# Patient Record
Sex: FEMALE | Race: WHITE | NOT HISPANIC OR LATINO | Employment: FULL TIME | URBAN - METROPOLITAN AREA
[De-identification: names, ages, dates, MRNs, and addresses within clinical notes are randomized per-mention and may not be internally consistent; named-entity substitution may affect disease eponyms.]

---

## 2017-01-24 ENCOUNTER — OFFICE VISIT (OUTPATIENT)
Dept: URGENT CARE | Facility: PHYSICIAN GROUP | Age: 22
End: 2017-01-24
Payer: COMMERCIAL

## 2017-01-24 ENCOUNTER — HOSPITAL ENCOUNTER (OUTPATIENT)
Facility: MEDICAL CENTER | Age: 22
End: 2017-01-24
Attending: EMERGENCY MEDICINE
Payer: COMMERCIAL

## 2017-01-24 VITALS
HEART RATE: 60 BPM | WEIGHT: 200 LBS | SYSTOLIC BLOOD PRESSURE: 124 MMHG | DIASTOLIC BLOOD PRESSURE: 80 MMHG | TEMPERATURE: 97.3 F | BODY MASS INDEX: 31.32 KG/M2 | OXYGEN SATURATION: 97 %

## 2017-01-24 DIAGNOSIS — J02.9 PHARYNGITIS, UNSPECIFIED ETIOLOGY: ICD-10-CM

## 2017-01-24 LAB
INT CON NEG: NEGATIVE
INT CON POS: POSITIVE
S PYO AG THROAT QL: NORMAL

## 2017-01-24 PROCEDURE — 87880 STREP A ASSAY W/OPTIC: CPT | Performed by: EMERGENCY MEDICINE

## 2017-01-24 PROCEDURE — 99214 OFFICE O/P EST MOD 30 MIN: CPT | Performed by: EMERGENCY MEDICINE

## 2017-01-24 PROCEDURE — 87070 CULTURE OTHR SPECIMN AEROBIC: CPT

## 2017-01-24 RX ORDER — ALBUTEROL SULFATE 90 UG/1
2 AEROSOL, METERED RESPIRATORY (INHALATION) EVERY 6 HOURS PRN
Qty: 8.5 G | Refills: 1 | Status: SHIPPED | OUTPATIENT
Start: 2017-01-24 | End: 2020-07-16

## 2017-01-24 RX ORDER — TRAMADOL HYDROCHLORIDE 50 MG/1
50 TABLET ORAL EVERY 4 HOURS PRN
COMMUNITY
End: 2017-03-28

## 2017-01-24 ASSESSMENT — ENCOUNTER SYMPTOMS
SENSORY CHANGE: 0
SPEECH CHANGE: 0
HEADACHES: 0
NAUSEA: 0
NERVOUS/ANXIOUS: 0
NECK PAIN: 0
HOARSE VOICE: 0
COUGH: 0
TROUBLE SWALLOWING: 1
FEVER: 0
BACK PAIN: 0
SWOLLEN GLANDS: 1
ABDOMINAL PAIN: 0
CHILLS: 0
DIARRHEA: 0
STRIDOR: 0
SHORTNESS OF BREATH: 0
SORE THROAT: 1
VOMITING: 0

## 2017-01-24 NOTE — Clinical Note
January 24, 2017        Alexis Osorio  [address] [place] [address] [address] [place] [address]        Dear Alexis:    Please ask to be allowed to stay home from school/ work for the next two days, for medical reasons.    If you have any questions or concerns, please don't hesitate to call.        Sincerely,        CHAU Brody M.D.    Electronically Signed

## 2017-01-24 NOTE — PROGRESS NOTES
Subjective:      Alexis Osorio is a 21 y.o. female who presents with Pharyngitis            Pharyngitis   This is a recurrent problem. The current episode started in the past 7 days. Neither side of throat is experiencing more pain than the other. There has been no fever. The pain is at a severity of 5/10. Associated symptoms include swollen glands and trouble swallowing. Pertinent negatives include no abdominal pain, congestion, coughing, diarrhea, drooling, ear pain, headaches, hoarse voice, neck pain, shortness of breath, stridor or vomiting.   No Known Allergies  Social History     Social History   • Marital Status: Single     Spouse Name: N/A   • Number of Children: N/A   • Years of Education: N/A     Occupational History   • Not on file.     Social History Main Topics   • Smoking status: Light Tobacco Smoker -- 0.25 packs/day   • Smokeless tobacco: Not on file      Comment: quit 1 week ago   • Alcohol Use: No   • Drug Use: No   • Sexual Activity:     Partners: Male     Birth Control/ Protection: Condom, Pill     Other Topics Concern   • Not on file     Social History Narrative     Past Medical History   Diagnosis Date   • Axillary abscess 9/30/2012   • Asthma    • Anxiety      Current Outpatient Prescriptions on File Prior to Visit   Medication Sig Dispense Refill   • escitalopram (LEXAPRO) 10 MG Tab Take 10 mg by mouth every day.     • azithromycin (ZITHROMAX) 250 MG Tab Take 2 tabs by mouth on day 1, then take 1 tab on days 2-5 6 Tab 0   • ibuprofen (MOTRIN) 200 MG Tab Take 200 mg by mouth every 6 hours as needed.     • Pseudoeph-Doxylamine-DM-APAP (NYQUIL MULTI-SYMPTOM PO) Take  by mouth.     • amoxicillin-clavulanate (AUGMENTIN) 875-125 MG Tab Take 1 Tab by mouth 2 times a day. 20 Tab 0   • methylPREDNISolone (MEDROL DOSPACK) 4 MG Tab Use taper dose bharathi as directed 21 Tab 0   • cefdinir (OMNICEF) 300 MG Cap Take 1 Cap by mouth 2 times a day. 20 Cap 0   • hydrocod polst-chlorphen polst (TUSSIONEX) 10-8  MG/5ML Liquid CR Take 5 mL by mouth every 12 hours. 140 mL 0   • benzonatate (TESSALON) 100 MG Cap Take 1 Cap by mouth 3 times a day as needed for Cough. 30 Cap 0   • desogestrel-ethinyl estradiol (ENSKYCE) 0.15-30 MG-MCG per tablet Take 0.15 Tabs by mouth every day.       No current facility-administered medications on file prior to visit.   History reviewed. No pertinent family history.  Review of Systems   Constitutional: Negative for fever and chills.   HENT: Positive for sore throat and trouble swallowing. Negative for congestion, drooling, ear pain and hoarse voice.    Respiratory: Negative for cough, shortness of breath and stridor.    Gastrointestinal: Negative for nausea, vomiting, abdominal pain and diarrhea.   Genitourinary: Negative.    Musculoskeletal: Negative for back pain and neck pain.   Skin: Negative for rash.   Neurological: Negative for sensory change, speech change and headaches.   Psychiatric/Behavioral: The patient is not nervous/anxious.           Objective:     /80 mmHg  Pulse 60  Temp(Src) 36.3 °C (97.3 °F)  Wt 90.719 kg (200 lb)  SpO2 97%     Physical Exam   Constitutional: She appears well-developed and well-nourished. No distress.   HENT:   Head: Atraumatic.   Right Ear: External ear normal.   Left Ear: External ear normal.   Mouth/Throat: No oropharyngeal exudate.   Patient's pharynx is slightly inflamed, no exudates, no presence of  peritonsillar or retropharyngeal abscess.   Eyes: Conjunctivae are normal. Right eye exhibits no discharge. Left eye exhibits no discharge.   Neck: Normal range of motion. No tracheal deviation present. No thyromegaly present.   Cardiovascular: Normal rate and regular rhythm.    Pulmonary/Chest: Effort normal and breath sounds normal. No stridor. No respiratory distress. She has no wheezes. She has no rales.   Abdominal: She exhibits no distension. There is no tenderness.   Musculoskeletal: Normal range of motion. She exhibits no edema or  tenderness.   Lymphadenopathy:     She has cervical adenopathy.   Neurological: She is alert. Coordination normal.   Skin: Skin is warm and dry. She is not diaphoretic.   Psychiatric: She has a normal mood and affect. Her behavior is normal.               Assessment/Plan:     DX: Pharyngitis          Tobacco abuse.    Rapid strep negative    Throat culture pending    I am recommending the patient initiate/ continue hydration efforts including the use of a vaporizer/humidifier/ netti pot. I also recommend the pt, initiate Mucinex, Mucinex D. in addition the patient will initiate the prescribed prescription medication/s: I will call her for cultures positive If the patient's condition exacerbates with worsening dysphagia, shortness of breath, uncontrolled fever, headache or chest pressure he/she will return immediately to the urgent care or go to  the emergency department for further evaluation. Patient has been given the next 1-2 days off.    CHAU Brody

## 2017-01-24 NOTE — PATIENT INSTRUCTIONS
I will call the patient if her throat culture is positive. We will discuss further time off if necessary.

## 2017-01-26 LAB
BACTERIA SPEC RESP CULT: NORMAL
SIGNIFICANT IND 70042: NORMAL
SOURCE SOURCE: NORMAL

## 2017-02-02 ENCOUNTER — HOSPITAL ENCOUNTER (OUTPATIENT)
Dept: RADIOLOGY | Facility: MEDICAL CENTER | Age: 22
End: 2017-02-02
Attending: OBSTETRICS & GYNECOLOGY
Payer: COMMERCIAL

## 2017-02-02 DIAGNOSIS — R10.2 VAGINAL PAIN: ICD-10-CM

## 2017-02-02 PROCEDURE — 76830 TRANSVAGINAL US NON-OB: CPT

## 2017-02-13 ENCOUNTER — OFFICE VISIT (OUTPATIENT)
Dept: URGENT CARE | Facility: PHYSICIAN GROUP | Age: 22
End: 2017-02-13
Payer: COMMERCIAL

## 2017-02-13 VITALS
SYSTOLIC BLOOD PRESSURE: 110 MMHG | HEIGHT: 66 IN | RESPIRATION RATE: 16 BRPM | OXYGEN SATURATION: 99 % | TEMPERATURE: 97.2 F | HEART RATE: 65 BPM | WEIGHT: 200 LBS | BODY MASS INDEX: 32.14 KG/M2 | DIASTOLIC BLOOD PRESSURE: 64 MMHG

## 2017-02-13 DIAGNOSIS — Z01.810 PRE-OPERATIVE CARDIOVASCULAR EXAMINATION: ICD-10-CM

## 2017-02-13 DIAGNOSIS — Z01.812 PRE-OPERATIVE LABORATORY EXAMINATION: ICD-10-CM

## 2017-02-13 DIAGNOSIS — J02.9 VIRAL PHARYNGITIS: Primary | ICD-10-CM

## 2017-02-13 LAB
ANION GAP SERPL CALC-SCNC: 9 MMOL/L (ref 0–11.9)
BUN SERPL-MCNC: 7 MG/DL (ref 8–22)
CALCIUM SERPL-MCNC: 9.2 MG/DL (ref 8.5–10.5)
CHLORIDE SERPL-SCNC: 106 MMOL/L (ref 96–112)
CO2 SERPL-SCNC: 23 MMOL/L (ref 20–33)
CREAT SERPL-MCNC: 0.53 MG/DL (ref 0.5–1.4)
ERYTHROCYTE [DISTWIDTH] IN BLOOD BY AUTOMATED COUNT: 41.5 FL (ref 35.9–50)
GFR SERPL CREATININE-BSD FRML MDRD: >60 ML/MIN/1.73 M 2
GLUCOSE SERPL-MCNC: 94 MG/DL (ref 65–99)
HCG SERPL QL: NEGATIVE
HCT VFR BLD AUTO: 38.9 % (ref 37–47)
HGB BLD-MCNC: 13.1 G/DL (ref 12–16)
INT CON NEG: NEGATIVE
INT CON POS: POSITIVE
MCH RBC QN AUTO: 31.2 PG (ref 27–33)
MCHC RBC AUTO-ENTMCNC: 33.7 G/DL (ref 33.6–35)
MCV RBC AUTO: 92.6 FL (ref 81.4–97.8)
PLATELET # BLD AUTO: 314 K/UL (ref 164–446)
PMV BLD AUTO: 9.1 FL (ref 9–12.9)
POTASSIUM SERPL-SCNC: 4 MMOL/L (ref 3.6–5.5)
RBC # BLD AUTO: 4.2 M/UL (ref 4.2–5.4)
S PYO AG THROAT QL: NEGATIVE
SODIUM SERPL-SCNC: 138 MMOL/L (ref 135–145)
WBC # BLD AUTO: 5.7 K/UL (ref 4.8–10.8)

## 2017-02-13 PROCEDURE — 36415 COLL VENOUS BLD VENIPUNCTURE: CPT

## 2017-02-13 PROCEDURE — 85027 COMPLETE CBC AUTOMATED: CPT

## 2017-02-13 PROCEDURE — 99213 OFFICE O/P EST LOW 20 MIN: CPT | Performed by: PHYSICIAN ASSISTANT

## 2017-02-13 PROCEDURE — 84703 CHORIONIC GONADOTROPIN ASSAY: CPT

## 2017-02-13 PROCEDURE — 87880 STREP A ASSAY W/OPTIC: CPT | Performed by: PHYSICIAN ASSISTANT

## 2017-02-13 PROCEDURE — 80048 BASIC METABOLIC PNL TOTAL CA: CPT

## 2017-02-13 NOTE — PATIENT INSTRUCTIONS
"Strep Throat  Strep throat is an infection of the throat caused by a bacteria named Streptococcus pyogenes. Your health care provider may call the infection streptococcal \"tonsillitis\" or \"pharyngitis\" depending on whether there are signs of inflammation in the tonsils or back of the throat. Strep throat is most common in children aged 5-15 years during the cold months of the year, but it can occur in people of any age during any season. This infection is spread from person to person (contagious) through coughing, sneezing, or other close contact.  SIGNS AND SYMPTOMS   · Fever or chills.  · Painful, swollen, red tonsils or throat.  · Pain or difficulty when swallowing.  · White or yellow spots on the tonsils or throat.  · Swollen, tender lymph nodes or \"glands\" of the neck or under the jaw.  · Red rash all over the body (rare).  DIAGNOSIS   Many different infections can cause the same symptoms. A test must be done to confirm the diagnosis so the right treatment can be given. A \"rapid strep test\" can help your health care provider make the diagnosis in a few minutes. If this test is not available, a light swab of the infected area can be used for a throat culture test. If a throat culture test is done, results are usually available in a day or two.  TREATMENT   Strep throat is treated with antibiotic medicine.  HOME CARE INSTRUCTIONS   · Gargle with 1 tsp of salt in 1 cup of warm water, 3-4 times per day or as needed for comfort.  · Family members who also have a sore throat or fever should be tested for strep throat and treated with antibiotics if they have the strep infection.  · Make sure everyone in your household washes their hands well.  · Do not share food, drinking cups, or personal items that could cause the infection to spread to others.  · You may need to eat a soft food diet until your sore throat gets better.  · Drink enough water and fluids to keep your urine clear or pale yellow. This will help prevent " dehydration.  · Get plenty of rest.  · Stay home from school, day care, or work until you have been on antibiotics for 24 hours.  · Take medicines only as directed by your health care provider.  · Take your antibiotic medicine as directed by your health care provider. Finish it even if you start to feel better.  SEEK MEDICAL CARE IF:   · The glands in your neck continue to enlarge.  · You develop a rash, cough, or earache.  · You cough up green, yellow-brown, or bloody sputum.  · You have pain or discomfort not controlled by medicines.  · Your problems seem to be getting worse rather than better.  · You have a fever.  SEEK IMMEDIATE MEDICAL CARE IF:   · You develop any new symptoms such as vomiting, severe headache, stiff or painful neck, chest pain, shortness of breath, or trouble swallowing.  · You develop severe throat pain, drooling, or changes in your voice.  · You develop swelling of the neck, or the skin on the neck becomes red and tender.  · You develop signs of dehydration, such as fatigue, dry mouth, and decreased urination.  · You become increasingly sleepy, or you cannot wake up completely.  MAKE SURE YOU:  · Understand these instructions.  · Will watch your condition.  · Will get help right away if you are not doing well or get worse.     This information is not intended to replace advice given to you by your health care provider. Make sure you discuss any questions you have with your health care provider.     Document Released: 12/15/2001 Document Revised: 01/08/2016 Document Reviewed: 04/11/2016  [employer] Interactive Patient Education ©2016 [employer]

## 2017-02-13 NOTE — PROGRESS NOTES
"Subjective:   Pt is a 21 y.o. female who presents with Pharyngitis            Pharyngitis   This is a new problem. The current episode started yesterday. The problem has been gradually worsening. The pain is worse on the left side. The pain is at a severity of 7/10. The pain is moderate. She has had no exposure to strep or mono. She has tried cool liquids and gargles for the symptoms. The treatment provided mild relief.   PT presents to UC clinic today complaining of sore throat, fevers, chills, watery eyes, pressure in ears, cough, fatigue, runny nose. PT denies CP, SOB, NVD, abdominal pain, joint pain. PT states these symptoms began around 2 days ago and that the pt's family has been sick on and off for the last week. Pt has not taken any medications for this condition. Pt notes surgery tomorrow for pelvoscopy. The pt's medication list, problem list, and allergies have been evaluated and reviewed during today's visit.      PMH:  Past Medical History   Diagnosis Date   • Axillary abscess 9/30/2012   • Asthma    • Anxiety    • Breath shortness    • Snoring    • Anesthesia      pt stated her \"Dad woke up during surgery\"   • Gynecological disorder    • Cold      off & on 4 months   • Pain      during period   • Psychiatric problem      depression, anxiety   • Pneumonia 2015       PSH:  Past Surgical History   Procedure Laterality Date   • Appendectomy         Fam Hx:  Father alive and well with no major medical issues  Mother alive and well with no major medical  Issues        Soc HX:  Social History     Social History   • Marital Status: Single     Spouse Name: N/A   • Number of Children: N/A   • Years of Education: N/A     Occupational History   • Not on file.     Social History Main Topics   • Smoking status: Light Tobacco Smoker -- 0.25 packs/day for 5 years     Types: Cigarettes   • Smokeless tobacco: Current User      Comment: quit 1 week ago   • Alcohol Use: No   • Drug Use: Yes      Comment: marijuana   • " Sexual Activity:     Partners: Male     Birth Control/ Protection: Condom, Pill     Other Topics Concern   • Not on file     Social History Narrative         Medications:    Current outpatient prescriptions:   •  tramadol (ULTRAM) 50 MG Tab, Take 50 mg by mouth every four hours as needed., Disp: , Rfl:   •  escitalopram (LEXAPRO) 10 MG Tab, Take 10 mg by mouth every day., Disp: , Rfl:   •  Fexofenadine HCl (MUCINEX ALLERGY PO), Take  by mouth as needed., Disp: , Rfl:   •  albuterol 108 (90 BASE) MCG/ACT Aero Soln inhalation aerosol, Inhale 2 Puffs by mouth every 6 hours as needed for Shortness of Breath., Disp: 8.5 g, Rfl: 1  •  ibuprofen (MOTRIN) 200 MG Tab, Take 200 mg by mouth every 6 hours as needed., Disp: , Rfl:   •  hydrocod polst-chlorphen polst (TUSSIONEX) 10-8 MG/5ML Liquid CR, Take 5 mL by mouth every 12 hours., Disp: 140 mL, Rfl: 0      Allergies:  Review of patient's allergies indicates no known allergies.        ROS  Constitutional: Positive for chills and malaise/fatigue.   HENT: Positive for congestion and sore throat. Negative for ear pain.    Eyes: Negative for blurred vision, double vision and photophobia.   Respiratory: Positive for cough and sputum production. Negative for hemoptysis, shortness of breath and wheezing.    Cardiovascular: Negative for chest pain and palpitations.   Gastrointestinal: Negative for nausea, vomiting, abdominal pain, diarrhea and constipation.   Genitourinary: Negative for dysuria and flank pain.   Musculoskeletal: Negative for falls and myalgias.   Skin: Negative for itching and rash.   Neurological: Positive for headaches. Negative for dizziness and tingling.   Endo/Heme/Allergies: Does not bruise/bleed easily.   Psychiatric/Behavioral: Negative for depression. The patient is not nervous/anxious.             Objective:     LMP 02/13/2017     Physical Exam      Constitutional: PT is oriented to person, place, and time. PT appears well-developed and well-nourished. No  distress.   HENT:   Head: Normocephalic and atraumatic.   Right Ear: Hearing, tympanic membrane, external ear and ear canal normal.   Left Ear: Hearing, tympanic membrane, external ear and ear canal normal.   Nose: Mucosal edema, rhinorrhea and sinus tenderness present. Right sinus exhibits frontal sinus tenderness. Left sinus exhibits frontal sinus tenderness.   Mouth/Throat: Uvula is midline. Mucous membranes are pale. Posterior oropharyngeal edema and posterior oropharyngeal erythema present. No oropharyngeal exudate.   Eyes: Conjunctivae normal and EOM are normal. Pupils are equal, round, and reactive to light.   Neck: Normal range of motion. Neck supple. No thyromegaly present.   Cardiovascular: Normal rate, regular rhythm, normal heart sounds and intact distal pulses.  Exam reveals no gallop and no friction rub.    No murmur heard.  Pulmonary/Chest: Effort normal and breath sounds normal. No respiratory distress. PT has no wheezes. PT has no rales. PT exhibits no tenderness.   Abdominal: Soft. Bowel sounds are normal. PT exhibits no distension and no mass. There is no tenderness. There is no rebound and no guarding.   Musculoskeletal: Normal range of motion. PT exhibits no edema and no tenderness.   Lymphadenopathy:     PT has no cervical adenopathy.   Neurological: PT is alert and oriented to person, place, and time. PT displays normal reflexes. No cranial nerve deficit. PT exhibits normal muscle tone. Coordination normal.   Skin: Skin is warm and dry. No rash noted. No erythema.   Psychiatric: PT has a normal mood and affect. PT behavior is normal. Judgment and thought content normal.          Assessment/Plan:     1. Viral pharyngitis    - POCT Rapid Strep A-->NEG    Rest, fluids encouraged.  OTC decongestant for congestion  AVS with medical info given.  Pt was in full understanding and agreement with the plan.  Follow-up as needed if symptoms worsen or fail to improve.

## 2017-02-14 ENCOUNTER — HOSPITAL ENCOUNTER (OUTPATIENT)
Facility: MEDICAL CENTER | Age: 22
End: 2017-02-14
Attending: OBSTETRICS & GYNECOLOGY | Admitting: OBSTETRICS & GYNECOLOGY
Payer: COMMERCIAL

## 2017-02-14 VITALS
SYSTOLIC BLOOD PRESSURE: 123 MMHG | RESPIRATION RATE: 16 BRPM | TEMPERATURE: 99 F | HEART RATE: 88 BPM | OXYGEN SATURATION: 97 % | WEIGHT: 198.63 LBS | BODY MASS INDEX: 31.92 KG/M2 | HEIGHT: 66 IN | DIASTOLIC BLOOD PRESSURE: 58 MMHG

## 2017-02-14 PROBLEM — R10.2 PELVIC PAIN IN FEMALE: Status: ACTIVE | Noted: 2017-02-14

## 2017-02-14 PROCEDURE — 160048 HCHG OR STATISTICAL LEVEL 1-5: Performed by: OBSTETRICS & GYNECOLOGY

## 2017-02-14 PROCEDURE — 500577 HCHG FORCEP, BIPO CUT (EVEREST): Performed by: OBSTETRICS & GYNECOLOGY

## 2017-02-14 PROCEDURE — 501572 HCHG TROCAR, SHIELD OBTU 5X100: Performed by: OBSTETRICS & GYNECOLOGY

## 2017-02-14 PROCEDURE — 500886 HCHG PACK, LAPAROSCOPY: Performed by: OBSTETRICS & GYNECOLOGY

## 2017-02-14 PROCEDURE — 501688 HCHG UTERINE MANIPULATOR RUMI: Performed by: OBSTETRICS & GYNECOLOGY

## 2017-02-14 PROCEDURE — 700101 HCHG RX REV CODE 250

## 2017-02-14 PROCEDURE — 700111 HCHG RX REV CODE 636 W/ 250 OVERRIDE (IP)

## 2017-02-14 PROCEDURE — A9270 NON-COVERED ITEM OR SERVICE: HCPCS

## 2017-02-14 PROCEDURE — 160029 HCHG SURGERY MINUTES - 1ST 30 MINS LEVEL 4: Performed by: OBSTETRICS & GYNECOLOGY

## 2017-02-14 PROCEDURE — 160009 HCHG ANES TIME/MIN: Performed by: OBSTETRICS & GYNECOLOGY

## 2017-02-14 PROCEDURE — A4314 CATH W/DRAINAGE 2-WAY LATEX: HCPCS | Performed by: OBSTETRICS & GYNECOLOGY

## 2017-02-14 PROCEDURE — 700102 HCHG RX REV CODE 250 W/ 637 OVERRIDE(OP)

## 2017-02-14 PROCEDURE — 500025 HCHG ARMREST, CRADLE FOAM: Performed by: OBSTETRICS & GYNECOLOGY

## 2017-02-14 PROCEDURE — 502240 HCHG MISC OR SUPPLY RC 0272: Performed by: OBSTETRICS & GYNECOLOGY

## 2017-02-14 PROCEDURE — 160002 HCHG RECOVERY MINUTES (STAT): Performed by: OBSTETRICS & GYNECOLOGY

## 2017-02-14 PROCEDURE — 160035 HCHG PACU - 1ST 60 MINS PHASE I: Performed by: OBSTETRICS & GYNECOLOGY

## 2017-02-14 PROCEDURE — 501838 HCHG SUTURE GENERAL: Performed by: OBSTETRICS & GYNECOLOGY

## 2017-02-14 PROCEDURE — 160046 HCHG PACU - 1ST 60 MINS PHASE II: Performed by: OBSTETRICS & GYNECOLOGY

## 2017-02-14 PROCEDURE — 110382 HCHG SHELL REV 271: Performed by: OBSTETRICS & GYNECOLOGY

## 2017-02-14 PROCEDURE — 501582 HCHG TROCAR, THRD BLADED: Performed by: OBSTETRICS & GYNECOLOGY

## 2017-02-14 PROCEDURE — 500868 HCHG NEEDLE, SURGI(VARES): Performed by: OBSTETRICS & GYNECOLOGY

## 2017-02-14 PROCEDURE — 160036 HCHG PACU - EA ADDL 30 MINS PHASE I: Performed by: OBSTETRICS & GYNECOLOGY

## 2017-02-14 PROCEDURE — 88305 TISSUE EXAM BY PATHOLOGIST: CPT

## 2017-02-14 PROCEDURE — 160025 RECOVERY II MINUTES (STATS): Performed by: OBSTETRICS & GYNECOLOGY

## 2017-02-14 PROCEDURE — 501159 HCHG PROBE, LAP: Performed by: OBSTETRICS & GYNECOLOGY

## 2017-02-14 PROCEDURE — 160041 HCHG SURGERY MINUTES - EA ADDL 1 MIN LEVEL 4: Performed by: OBSTETRICS & GYNECOLOGY

## 2017-02-14 PROCEDURE — 110371 HCHG SHELL REV 272: Performed by: OBSTETRICS & GYNECOLOGY

## 2017-02-14 PROCEDURE — A4606 OXYGEN PROBE USED W OXIMETER: HCPCS | Performed by: OBSTETRICS & GYNECOLOGY

## 2017-02-14 RX ORDER — SODIUM CHLORIDE, SODIUM LACTATE, POTASSIUM CHLORIDE, CALCIUM CHLORIDE 600; 310; 30; 20 MG/100ML; MG/100ML; MG/100ML; MG/100ML
1000 INJECTION, SOLUTION INTRAVENOUS
Status: COMPLETED | OUTPATIENT
Start: 2017-02-14 | End: 2017-02-14

## 2017-02-14 RX ORDER — OXYCODONE HCL 5 MG/5 ML
SOLUTION, ORAL ORAL
Status: COMPLETED
Start: 2017-02-14 | End: 2017-02-14

## 2017-02-14 RX ORDER — ZINC OXIDE 13 %
1 CREAM (GRAM) TOPICAL DAILY
COMMUNITY
End: 2017-03-28

## 2017-02-14 RX ORDER — ONDANSETRON 2 MG/ML
INJECTION INTRAMUSCULAR; INTRAVENOUS
Status: COMPLETED
Start: 2017-02-14 | End: 2017-02-14

## 2017-02-14 RX ORDER — METOCLOPRAMIDE HYDROCHLORIDE 5 MG/ML
10 INJECTION INTRAMUSCULAR; INTRAVENOUS EVERY 4 HOURS PRN
Status: DISCONTINUED | OUTPATIENT
Start: 2017-02-14 | End: 2017-02-14 | Stop reason: HOSPADM

## 2017-02-14 RX ORDER — HYDROCODONE BITARTRATE AND ACETAMINOPHEN 5; 325 MG/1; MG/1
1 TABLET ORAL EVERY 4 HOURS PRN
Status: DISCONTINUED | OUTPATIENT
Start: 2017-02-14 | End: 2017-02-14 | Stop reason: HOSPADM

## 2017-02-14 RX ORDER — SODIUM CHLORIDE, SODIUM LACTATE, POTASSIUM CHLORIDE, CALCIUM CHLORIDE 600; 310; 30; 20 MG/100ML; MG/100ML; MG/100ML; MG/100ML
INJECTION, SOLUTION INTRAVENOUS CONTINUOUS
Status: DISCONTINUED | OUTPATIENT
Start: 2017-02-14 | End: 2017-02-14 | Stop reason: HOSPADM

## 2017-02-14 RX ORDER — LIDOCAINE HYDROCHLORIDE 10 MG/ML
INJECTION, SOLUTION INFILTRATION; PERINEURAL
Status: COMPLETED
Start: 2017-02-14 | End: 2017-02-14

## 2017-02-14 RX ORDER — SIMETHICONE 80 MG
80 TABLET,CHEWABLE ORAL EVERY 8 HOURS PRN
Status: DISCONTINUED | OUTPATIENT
Start: 2017-02-14 | End: 2017-02-14 | Stop reason: HOSPADM

## 2017-02-14 RX ORDER — HYDROCODONE BITARTRATE AND ACETAMINOPHEN 5; 325 MG/1; MG/1
2 TABLET ORAL EVERY 6 HOURS PRN
Status: DISCONTINUED | OUTPATIENT
Start: 2017-02-14 | End: 2017-02-14 | Stop reason: HOSPADM

## 2017-02-14 RX ORDER — IBUPROFEN 200 MG
600 TABLET ORAL EVERY 6 HOURS PRN
Status: DISCONTINUED | OUTPATIENT
Start: 2017-02-14 | End: 2017-02-14 | Stop reason: HOSPADM

## 2017-02-14 RX ORDER — PROMETHAZINE HYDROCHLORIDE 25 MG/1
25 SUPPOSITORY RECTAL EVERY 4 HOURS PRN
Status: DISCONTINUED | OUTPATIENT
Start: 2017-02-14 | End: 2017-02-14 | Stop reason: HOSPADM

## 2017-02-14 RX ORDER — IBUPROFEN 200 MG
TABLET ORAL
Status: COMPLETED
Start: 2017-02-14 | End: 2017-02-14

## 2017-02-14 RX ORDER — METOCLOPRAMIDE HYDROCHLORIDE 5 MG/ML
INJECTION INTRAMUSCULAR; INTRAVENOUS
Status: COMPLETED
Start: 2017-02-14 | End: 2017-02-14

## 2017-02-14 RX ORDER — ACETAMINOPHEN 500 MG
1000 TABLET ORAL EVERY 6 HOURS PRN
COMMUNITY
End: 2017-06-20

## 2017-02-14 RX ORDER — ESCITALOPRAM OXALATE 20 MG/1
20 TABLET ORAL DAILY
Status: ON HOLD | COMMUNITY
End: 2018-06-17

## 2017-02-14 RX ORDER — ONDANSETRON 2 MG/ML
4 INJECTION INTRAMUSCULAR; INTRAVENOUS EVERY 6 HOURS PRN
Status: DISCONTINUED | OUTPATIENT
Start: 2017-02-14 | End: 2017-02-14 | Stop reason: HOSPADM

## 2017-02-14 RX ORDER — DIPHENHYDRAMINE HYDROCHLORIDE 50 MG/ML
INJECTION INTRAMUSCULAR; INTRAVENOUS
Status: COMPLETED
Start: 2017-02-14 | End: 2017-02-14

## 2017-02-14 RX ORDER — BUPIVACAINE HYDROCHLORIDE AND EPINEPHRINE 2.5; 5 MG/ML; UG/ML
INJECTION, SOLUTION EPIDURAL; INFILTRATION; INTRACAUDAL; PERINEURAL
Status: DISCONTINUED | OUTPATIENT
Start: 2017-02-14 | End: 2017-02-14 | Stop reason: HOSPADM

## 2017-02-14 RX ADMIN — FENTANYL CITRATE 50 MCG: 50 INJECTION, SOLUTION INTRAMUSCULAR; INTRAVENOUS at 16:55

## 2017-02-14 RX ADMIN — HYDROMORPHONE HYDROCHLORIDE 0.2 MG: 1 INJECTION, SOLUTION INTRAMUSCULAR; INTRAVENOUS; SUBCUTANEOUS at 17:20

## 2017-02-14 RX ADMIN — HYDROMORPHONE HYDROCHLORIDE 0.4 MG: 1 INJECTION, SOLUTION INTRAMUSCULAR; INTRAVENOUS; SUBCUTANEOUS at 17:08

## 2017-02-14 RX ADMIN — Medication 600 MG: at 17:45

## 2017-02-14 RX ADMIN — OXYCODONE HYDROCHLORIDE 5 MG: 5 SOLUTION ORAL at 17:00

## 2017-02-14 RX ADMIN — ONDANSETRON 4 MG: 2 INJECTION, SOLUTION INTRAMUSCULAR; INTRAVENOUS at 17:00

## 2017-02-14 RX ADMIN — LIDOCAINE HYDROCHLORIDE: 10 INJECTION, SOLUTION INFILTRATION; PERINEURAL at 14:30

## 2017-02-14 RX ADMIN — DIPHENHYDRAMINE HYDROCHLORIDE 12.5 MG: 50 INJECTION INTRAMUSCULAR; INTRAVENOUS at 18:40

## 2017-02-14 RX ADMIN — IBUPROFEN 600 MG: 200 TABLET, FILM COATED ORAL at 17:45

## 2017-02-14 RX ADMIN — METOCLOPRAMIDE 10 MG: 5 INJECTION, SOLUTION INTRAMUSCULAR; INTRAVENOUS at 17:34

## 2017-02-14 RX ADMIN — HYDROMORPHONE HYDROCHLORIDE 0.4 MG: 1 INJECTION, SOLUTION INTRAMUSCULAR; INTRAVENOUS; SUBCUTANEOUS at 17:15

## 2017-02-14 RX ADMIN — FENTANYL CITRATE 50 MCG: 50 INJECTION, SOLUTION INTRAMUSCULAR; INTRAVENOUS at 17:41

## 2017-02-14 RX ADMIN — METOCLOPRAMIDE HYDROCHLORIDE 10 MG: 5 INJECTION INTRAMUSCULAR; INTRAVENOUS at 17:34

## 2017-02-14 RX ADMIN — SODIUM CHLORIDE, SODIUM LACTATE, POTASSIUM CHLORIDE, CALCIUM CHLORIDE 1000 ML: 600; 310; 30; 20 INJECTION, SOLUTION INTRAVENOUS at 14:30

## 2017-02-14 ASSESSMENT — PAIN SCALES - GENERAL
PAINLEVEL_OUTOF10: 6
PAINLEVEL_OUTOF10: 1
PAINLEVEL_OUTOF10: 1
PAINLEVEL_OUTOF10: 6
PAINLEVEL_OUTOF10: 8
PAINLEVEL_OUTOF10: 3
PAINLEVEL_OUTOF10: 7

## 2017-02-14 NOTE — IP AVS SNAPSHOT
" Home Care Instructions                                                                                                                Name:Alexis Osorio  Medical Record Number:[id number]  CSN: [id number]    YOB: 1995   Age: 21 y.o.  Sex: female  HT:1.676 m (5' 6\") (75 %, Z = 0.67, Source: [facility] 2-20 Years) WT: 90.1 kg (198 lb 10.2 oz) (97 %, Z = 1.93, Source: [facility] 2-20 Years)          Admit Date: 2/14/2017     Discharge Date:   Today's Date: 2/14/2017  Attending Doctor:  Sweta Yanez M.D.                  Allergies:  Review of patient's allergies indicates no known allergies.                Discharge Instructions         ACTIVITY: Rest and take it easy for the first 24 hours.  A responsible adult is recommended to remain with you during that time.  It is normal to feel sleepy.  We encourage you to not do anything that requires balance, judgment or coordination.    MILD FLU-LIKE SYMPTOMS ARE NORMAL. YOU MAY EXPERIENCE GENERALIZED MUSCLE ACHES, THROAT IRRITATION, HEADACHE AND/OR SOME NAUSEA.    FOR 24 HOURS DO NOT:  Drive, operate machinery or run household appliances.  Drink beer or alcoholic beverages.   Make important decisions or sign legal documents.    SPECIAL INSTRUCTIONS:   Keep Bowels soft and regular. May use colace 2x daily and milk of magnesia over the counter.   Pelvic rest for 6 weeks or until MD gives approval  Baths ok  May shower as desired  No new diet restriction    DIET: To avoid nausea, slowly advance diet as tolerated, avoiding spicy or greasy foods for the first day.  Add more substantial food to your diet according to your physician's instructions.  INCREASE FLUIDS AND FIBER TO AVOID CONSTIPATION.    SURGICAL DRESSING/BATHING: Follow instructions given to you by your surgeon    FOLLOW-UP APPOINTMENT:  A follow-up appointment should be arranged with your doctor; call to schedule.    You should CALL YOUR PHYSICIAN if you develop:  Fever greater than 101 degrees F.  Pain not " relieved by medication, or persistent nausea or vomiting.  Excessive bleeding (blood soaking through dressing) or unexpected drainage from the wound.  Extreme redness or swelling around the incision site, drainage of pus or foul smelling drainage.  Inability to urinate or empty your bladder within 8 hours.  Problems with breathing or chest pain.    You should call 911 if you develop problems with breathing or chest pain.  If you are unable to contact your doctor or surgical center, you should go to the nearest emergency room or urgent care center.  Physician's telephone #: [phone]    If any questions arise, call your doctor.  If your doctor is not available, please feel free to call the Surgical Center at [phone].  The Center is open Monday through Friday from 7AM to 7PM.  You can also call the [employer] HOTLINE open 24 hours/day, 7 days/week and speak to a nurse at [phone], or toll free at [phone].    A registered nurse may call you a few days after your surgery to see how you are doing after your procedure.    MEDICATIONS: Resume taking daily medication.  Take prescribed pain medication with food.  If no medication is prescribed, you may take non-aspirin pain medication if needed.  PAIN MEDICATION CAN BE VERY CONSTIPATING.  Take a stool softener or laxative such as senokot, pericolace, or milk of magnesia if needed.    Prescription at home.  Last pain medication given at 5:00PM  If your physician has prescribed pain medication that includes Acetaminophen (Tylenol), do not take additional Acetaminophen (Tylenol) while taking the prescribed medication.    Depression / Suicide Risk    As you are discharged from this [facility] facility, it is important to learn how to keep safe from harming yourself.    Recognize the warning signs:  · Abrupt changes in personality, positive or negative- including increase in energy   · Giving away possessions  · Change in eating patterns- significant weight  changes-  positive or negative  · Change in sleeping patterns- unable to sleep or sleeping all the time   · Unwillingness or inability to communicate  · Depression  · Unusual sadness, discouragement and loneliness  · Talk of wanting to die  · Neglect of personal appearance   · Rebelliousness- reckless behavior  · Withdrawal from people/activities they love  · Confusion- inability to concentrate     If you or a loved one observes any of these behaviors or has concerns about self-harm, here's what you can do:  · Talk about it- your feelings and reasons for harming yourself  · Remove any means that you might use to hurt yourself (examples: pills, rope, extension cords, firearm)  · Get professional help from the community (Mental Health, Substance Abuse, psychological counseling)  · Do not be alone:Call your Safe Contact- someone whom you trust who will be there for you.  · Call your local CRISIS HOTLINE 175-2741 or 443-129-5854  · Call your local Children's Mobile Crisis Response Team [place] (392) 653-7212 or www.[employer]  · Call the toll free National Suicide Prevention Hotlines   · National Suicide Prevention Lifeline 420-749-BSSO (7250)  · National Hope Line Network 800-SUICIDE (470-1515)       Medication List      CONTINUE taking these medications        Instructions    acetaminophen 500 MG Tabs   Commonly known as:  TYLENOL    Take 1,000 mg by mouth every 6 hours as needed.   Dose:  1000 mg       albuterol 108 (90 BASE) MCG/ACT Aers inhalation aerosol    Inhale 2 Puffs by mouth every 6 hours as needed for Shortness of Breath.   Dose:  2 Puff       LEXAPRO 20 MG tablet   Generic drug:  escitalopram    Take 20 mg by mouth every day.   Dose:  20 mg       MUCINEX ALLERGY PO    Take 2 Tabs by mouth as needed.   Dose:  2 Tab       multivitamin Tabs    Take 1 Tab by mouth every day.   Dose:  1 Tab       PROBIOTIC DAILY Caps    Take 1 Cap by mouth every day.   Dose:  1 Cap       tramadol 50 MG Tabs      Commonly known as:  ULTRAM    Take 50 mg by mouth every four hours as needed.   Dose:  50 mg               Medication Information     Above and/or attached are the medications your physician expects you to take upon discharge. Review all of your home medications and newly ordered medications with your doctor and/or pharmacist. Follow medication instructions as directed by your doctor and/or pharmacist. Please keep your medication list with you and share with your physician. Update the information when medications are discontinued, doses are changed, or new medications (including over-the-counter products) are added; and carry medication information at all times in the event of emergency situations.        Resources     Quit Smoking / Tobacco Use:    I understand the use of any tobacco products increases my chance of suffering from future heart disease or stroke and could cause other illnesses which may shorten my life. Quitting the use of tobacco products is the single most important thing I can do to improve my health. For further information on smoking / tobacco cessation call a Toll Free Quit Line at 1-567.609.2766 (*[employer] [place]) or 1-383.159.4724 (American Lung Association) or you can access the web based program at www.lungusa.org.    [place] Tobacco Users Help Line:  (543) 209-2574       Toll Free: 1-765.852.8039  Quit Tobacco Program [facility] (717)864-9162    Crisis Hotline:    [place] Crisis Hotline:  3-428-EIYBBMD or 1-125.364.7338    [place] Crisis Hotline:    1-159.234.9930 or 184-297-6372    Discharge Survey:   Thank you for choosing [facility]. We hope we did everything we could to make your hospital stay a pleasant one. You may be receiving a survey and we would appreciate your time and participation in answering the questions. Your input is very valuable to us in our efforts to improve our service to our patients and their families.            Signatures      My signature on this form indicates that:    1. I acknowledge receipt and understanding of these Home Care Instruction.  2. My questions regarding this information have been answered to my satisfaction.  3. I have formulated a plan with my discharge nurse to obtain my prescribed medications for home.    __________________________________      __________________________________                   Patient Signature                                 Guardian/Responsible Adult Signature      __________________________________                 __________       ________                       Nurse Signature                                               Date                 Time

## 2017-02-14 NOTE — H&P
PREOPERATIVE HISTORY AND PHYSICAL    CHIEF COMPLAINT:  Pelvic pain and nausea, especially around cycle.    HISTORY OF PRESENT ILLNESS:  Patient is a 21-year-old G0 who presented to my   office complaining of pelvic pain.  The patient states that her pain is awful,   it can be every day, but is definitely worse around her cycle.  She also gets   nauseated around the time of the cycle.  Her periods are heavy for 2 days   each month and then get lighter.  She is using tramadol to help with the pain   at this time.  She was offered Aygestin, but did not start it.  Suspect   endometriosis, so the patient is scheduled for a pelviscopy.    PAST MEDICAL HISTORY:  Asthma, migraine headaches, depression, and anxiety.    PAST SURGICAL HISTORY:  An appendectomy in 2001.    MENSTRUAL HISTORY:  The patient underwent menarche at age 14.  She has periods   each month that lasts for 5-7 days.  They are incapacitating and quite heavy   for 2 of those days.    OBSTETRICAL HISTORY:  She has had no pregnancies.    MEDICATIONS:  Include Lexapro 30 mg a day and birth control pills, which she   is off now.    FAMILY HISTORY:  Mother with endometriosis.    SOCIAL HISTORY:  The patient smokes 3 cigarettes a day and has for 40 years.    She does not drink alcohol.    ALLERGIES:  No known drug allergies.    PHYSICAL EXAMINATION:  VITAL SIGNS:  The patient's blood pressure is 102/68, her weight is 204, and   height is 5 feet 6 inches.  HEENT:  Within normal limits.  NECK:  Supple without lymphadenopathy or thyromegaly.  CARDIOVASCULAR:  Regular rate and rhythm.  LUNGS:  Clear to auscultation.  BACK:  No CVA tenderness.  ABDOMEN:  Soft and nontender, nondistended.  No masses are palpable.  PELVIC:  EGBUS appears normal.  Vagina appears normal.  Cervix appears normal.    Bimanual exam reveals the apex of the vagina to be slightly tender,   otherwise the uterus feels normal size and there are no adnexal masses.  EXTREMITIES:   Benign.    ASSESSMENT:  A 21-year-old G0 with pelvic pain, suspect endometriosis, mother   with endometriosis.    PLAN:  The patient is scheduled for a pelviscopy with fulguration and removal   of endometriosis.  Risks, benefits, alternatives and procedure were discussed   with the patient in detail and she agrees to proceed.  Preoperative labs will   be obtained.  Preoperative antibiotics will be administered.  If she has any   problems or questions, she can contact my office.       ____________________________________     MD JOHN CUELLO / BRENDAN    DD:  02/13/2017 23:06:27  DT:  02/14/2017 03:55:32    D#:  [id number]  Job#:  [id number]

## 2017-02-14 NOTE — IP AVS SNAPSHOT
2/14/2017          Alexis Osorio  [address]   [place] [address]    Dear Alexis:    [facility] wants to ensure your discharge home is safe and you or your loved ones have had all your questions answered regarding your care after you leave the hospital.    You may receive a telephone call within two days of your discharge.  This call is to make certain you understand your discharge instructions as well as ensure we provided you with the best care possible during your stay with us.     The call will only last approximately 3-5 minutes and will be done by a nurse.    Once again, we want to ensure your discharge home is safe and that you have a clear understanding of any next steps in your care.  If you have any questions or concerns, please do not hesitate to contact us, we are here for you.  Thank you for choosing [facility] for your healthcare needs.    Sincerely,    Topher Gar    [facility]

## 2017-02-15 NOTE — OP REPORT
DATE OF SERVICE:  02/14/2017    PREOPERATIVE DIAGNOSES:  Pelvic pain, suspect endometriosis, family history of   endometriosis.    POSTOPERATIVE DIAGNOSES:  Pelvic pain, suspect endometriosis, family history   of endometriosis.  The patient did have endometriosis and adhesions.    PROCEDURE:  Pelviscopy with adhesiolysis and excision and fulguration of   endometriosis.    SURGEON:  Sweta Yanez MD    ASSISTANT:  None.    ANESTHESIOLOGIST:  _____    ANESTHESIA:  General endotracheal.    SPECIMEN:  Peritoneal biopsies, rule out endometriosis.    ESTIMATED BLOOD LOSS:  10 mL.    FINDINGS AT THE TIME OF SURGERY:  Exam under anesthesia reveals a normal sized   and positioned uterus, which is smooth in contour and there are no adnexal   masses.  Laparoscopic findings confirm the uterus to be normal size.  The   tubes appeared normal.  The left ovary did have several implants of   endometriosis superficially on the posterior aspect and the beginnings of some   adhesions to the sidewall were forming.  The right ovary appeared normal.    There were several implants of endometriosis, which was superficial on the   posterior cul-de-sac just below the cervix and the start of the vaginal   mucosa.  There were also several implants in that left ovarian fossa as well   as in the right ovarian fossa.  There were also some pericecal adhesions.    Otherwise, the bowel was grossly normal.    TECHNIQUE:  Patient was taken to the operating room where general anesthesia   was placed.  She was then placed in Maikel stirrups with excellent positioning,   prepped and draped in normal sterile fashion.  A Milady manipulator was then   placed without difficulty.  Attention was then turned to the abdomen where   0.25% Marcaine with epinephrine was then injected at the base of umbilicus, 1   cm incision was made with the scalpel.  Veress needle placed, pneumoperitoneum   created with CO2 gas.  The trocar was introduced without difficulty.  The    above findings were noted.  A 5 mm incision was made suprapubically after   injecting 0.25% Marcaine with epinephrine and a 5 mm trocar was placed under   direct visualization without difficulty.  Attention was first turned to the   left ovary, which was minimally adherent to the left sidewall ovarian fossa   with implants of endometriosis.  This was pulled up easily and the underside   of the ovary was then gone over with the gold laser to remove any implants of   endometriosis.  I then did 2 peritoneal biopsies on this left ovarian fossa as   there was some prominent areas that looked consistent with endometriosis.  I   then went over the superficial implants of endometriosis in the left ovarian   fossa, also on the posterior cul-de-sac and right ovarian fossa.  Irrigation   was performed.  I took down some of the pericecal adhesions and placed Adept   solution at the end of the case.  Patient tolerated the procedure well.  I did   take out her manipulator and placed some silver nitrate at the tenaculum site   and ectocervix.  Patient was brought to recovery room in stable condition.       ____________________________________     MD JOHN CUELLO / BRENDAN    DD:  02/14/2017 16:56:32  DT:  02/14/2017 19:58:20    D#:  [id number]  Job#:  [id number]

## 2017-02-15 NOTE — OR NURSING
"Pt transported to bathroom via gurney. Ambulated to bathroom. Gait steady. Voided large amount of urine. Pt dressed in bathroom. Return to pacu 14a. Pt feels ready to discharge . States\" i feel much better'..   "

## 2017-02-15 NOTE — DISCHARGE INSTRUCTIONS
ACTIVITY: Rest and take it easy for the first 24 hours.  A responsible adult is recommended to remain with you during that time.  It is normal to feel sleepy.  We encourage you to not do anything that requires balance, judgment or coordination.    MILD FLU-LIKE SYMPTOMS ARE NORMAL. YOU MAY EXPERIENCE GENERALIZED MUSCLE ACHES, THROAT IRRITATION, HEADACHE AND/OR SOME NAUSEA.    FOR 24 HOURS DO NOT:  Drive, operate machinery or run household appliances.  Drink beer or alcoholic beverages.   Make important decisions or sign legal documents.    SPECIAL INSTRUCTIONS:   Keep Bowels soft and regular. May use colace 2x daily and milk of magnesia over the counter.   Pelvic rest for 6 weeks or until MD gives approval  Baths ok  May shower as desired  No new diet restriction    DIET: To avoid nausea, slowly advance diet as tolerated, avoiding spicy or greasy foods for the first day.  Add more substantial food to your diet according to your physician's instructions.  INCREASE FLUIDS AND FIBER TO AVOID CONSTIPATION.    SURGICAL DRESSING/BATHING: Follow instructions given to you by your surgeon    FOLLOW-UP APPOINTMENT:  A follow-up appointment should be arranged with your doctor; call to schedule.    You should CALL YOUR PHYSICIAN if you develop:  Fever greater than 101 degrees F.  Pain not relieved by medication, or persistent nausea or vomiting.  Excessive bleeding (blood soaking through dressing) or unexpected drainage from the wound.  Extreme redness or swelling around the incision site, drainage of pus or foul smelling drainage.  Inability to urinate or empty your bladder within 8 hours.  Problems with breathing or chest pain.    You should call 911 if you develop problems with breathing or chest pain.  If you are unable to contact your doctor or surgical center, you should go to the nearest emergency room or urgent care center.  Physician's telephone #: [phone]    If any questions arise, call your doctor.  If your  doctor is not available, please feel free to call the Surgical Center at [phone].  The Center is open Monday through Friday from 7AM to 7PM.  You can also call the HEALTH HOTLINE open 24 hours/day, 7 days/week and speak to a nurse at [phone], or toll free at [phone].    A registered nurse may call you a few days after your surgery to see how you are doing after your procedure.    MEDICATIONS: Resume taking daily medication.  Take prescribed pain medication with food.  If no medication is prescribed, you may take non-aspirin pain medication if needed.  PAIN MEDICATION CAN BE VERY CONSTIPATING.  Take a stool softener or laxative such as senokot, pericolace, or milk of magnesia if needed.    Prescription at home.  Last pain medication given at 5:00PM  If your physician has prescribed pain medication that includes Acetaminophen (Tylenol), do not take additional Acetaminophen (Tylenol) while taking the prescribed medication.    Depression / Suicide Risk    As you are discharged from this [facility] facility, it is important to learn how to keep safe from harming yourself.    Recognize the warning signs:  · Abrupt changes in personality, positive or negative- including increase in energy   · Giving away possessions  · Change in eating patterns- significant weight changes-  positive or negative  · Change in sleeping patterns- unable to sleep or sleeping all the time   · Unwillingness or inability to communicate  · Depression  · Unusual sadness, discouragement and loneliness  · Talk of wanting to die  · Neglect of personal appearance   · Rebelliousness- reckless behavior  · Withdrawal from people/activities they love  · Confusion- inability to concentrate     If you or a loved one observes any of these behaviors or has concerns about self-harm, here's what you can do:  · Talk about it- your feelings and reasons for harming yourself  · Remove any means that you might use to hurt yourself (examples:  pills, rope, extension cords, firearm)  · Get professional help from the community (Mental Health, Substance Abuse, psychological counseling)  · Do not be alone:Call your Safe Contact- someone whom you trust who will be there for you.  · Call your local CRISIS HOTLINE 657-4015 or 357-071-3876  · Call your local Children's Mobile Crisis Response Team [place] (130) 961-5799 or www.Liepin.com  · Call the toll free National Suicide Prevention Hotlines   · National Suicide Prevention Lifeline 252-157-XZSI (5118)  · National Hope Line Network 800-SUICIDE (821-5598)

## 2017-02-21 ENCOUNTER — HOSPITAL ENCOUNTER (EMERGENCY)
Facility: MEDICAL CENTER | Age: 22
End: 2017-02-21
Attending: EMERGENCY MEDICINE
Payer: COMMERCIAL

## 2017-02-21 VITALS
OXYGEN SATURATION: 97 % | BODY MASS INDEX: 32.14 KG/M2 | WEIGHT: 200 LBS | SYSTOLIC BLOOD PRESSURE: 121 MMHG | HEIGHT: 66 IN | TEMPERATURE: 97.2 F | HEART RATE: 58 BPM | RESPIRATION RATE: 16 BRPM | DIASTOLIC BLOOD PRESSURE: 58 MMHG

## 2017-02-21 DIAGNOSIS — R10.84 GENERALIZED ABDOMINAL PAIN: ICD-10-CM

## 2017-02-21 LAB
ALBUMIN SERPL BCP-MCNC: 4.1 G/DL (ref 3.2–4.9)
ALBUMIN/GLOB SERPL: 1 G/DL
ALP SERPL-CCNC: 85 U/L (ref 30–99)
ALT SERPL-CCNC: 34 U/L (ref 2–50)
ANION GAP SERPL CALC-SCNC: 10 MMOL/L (ref 0–11.9)
AST SERPL-CCNC: 18 U/L (ref 12–45)
BASOPHILS # BLD AUTO: 0.4 % (ref 0–1.8)
BASOPHILS # BLD: 0.04 K/UL (ref 0–0.12)
BILIRUB SERPL-MCNC: 0.4 MG/DL (ref 0.1–1.5)
BLOOD CULTURE HOLD CXBCH: NORMAL
BUN SERPL-MCNC: 9 MG/DL (ref 8–22)
CALCIUM SERPL-MCNC: 9.9 MG/DL (ref 8.5–10.5)
CHLORIDE SERPL-SCNC: 104 MMOL/L (ref 96–112)
CO2 SERPL-SCNC: 22 MMOL/L (ref 20–33)
CREAT SERPL-MCNC: 0.51 MG/DL (ref 0.5–1.4)
EOSINOPHIL # BLD AUTO: 0.04 K/UL (ref 0–0.51)
EOSINOPHIL NFR BLD: 0.4 % (ref 0–6.9)
ERYTHROCYTE [DISTWIDTH] IN BLOOD BY AUTOMATED COUNT: 41.2 FL (ref 35.9–50)
GFR SERPL CREATININE-BSD FRML MDRD: >60 ML/MIN/1.73 M 2
GLOBULIN SER CALC-MCNC: 4.3 G/DL (ref 1.9–3.5)
GLUCOSE SERPL-MCNC: 102 MG/DL (ref 65–99)
HCT VFR BLD AUTO: 42.1 % (ref 37–47)
HGB BLD-MCNC: 14.5 G/DL (ref 12–16)
IMM GRANULOCYTES # BLD AUTO: 0.04 K/UL (ref 0–0.11)
IMM GRANULOCYTES NFR BLD AUTO: 0.4 % (ref 0–0.9)
LIPASE SERPL-CCNC: 5 U/L (ref 11–82)
LYMPHOCYTES # BLD AUTO: 2.12 K/UL (ref 1–4.8)
LYMPHOCYTES NFR BLD: 20 % (ref 22–41)
MCH RBC QN AUTO: 31.7 PG (ref 27–33)
MCHC RBC AUTO-ENTMCNC: 34.4 G/DL (ref 33.6–35)
MCV RBC AUTO: 91.9 FL (ref 81.4–97.8)
MONOCYTES # BLD AUTO: 0.58 K/UL (ref 0–0.85)
MONOCYTES NFR BLD AUTO: 5.5 % (ref 0–13.4)
NEUTROPHILS # BLD AUTO: 7.8 K/UL (ref 2–7.15)
NEUTROPHILS NFR BLD: 73.3 % (ref 44–72)
NRBC # BLD AUTO: 0 K/UL
NRBC BLD AUTO-RTO: 0 /100 WBC
PLATELET # BLD AUTO: 357 K/UL (ref 164–446)
PMV BLD AUTO: 8.9 FL (ref 9–12.9)
POTASSIUM SERPL-SCNC: 3.8 MMOL/L (ref 3.6–5.5)
PROT SERPL-MCNC: 8.4 G/DL (ref 6–8.2)
RBC # BLD AUTO: 4.58 M/UL (ref 4.2–5.4)
SODIUM SERPL-SCNC: 136 MMOL/L (ref 135–145)
WBC # BLD AUTO: 10.6 K/UL (ref 4.8–10.8)

## 2017-02-21 PROCEDURE — 700111 HCHG RX REV CODE 636 W/ 250 OVERRIDE (IP): Performed by: EMERGENCY MEDICINE

## 2017-02-21 PROCEDURE — 700102 HCHG RX REV CODE 250 W/ 637 OVERRIDE(OP): Performed by: EMERGENCY MEDICINE

## 2017-02-21 PROCEDURE — 85025 COMPLETE CBC W/AUTO DIFF WBC: CPT

## 2017-02-21 PROCEDURE — 96372 THER/PROPH/DIAG INJ SC/IM: CPT

## 2017-02-21 PROCEDURE — A9270 NON-COVERED ITEM OR SERVICE: HCPCS | Performed by: EMERGENCY MEDICINE

## 2017-02-21 PROCEDURE — 83690 ASSAY OF LIPASE: CPT

## 2017-02-21 PROCEDURE — 80053 COMPREHEN METABOLIC PANEL: CPT

## 2017-02-21 PROCEDURE — 99284 EMERGENCY DEPT VISIT MOD MDM: CPT

## 2017-02-21 RX ORDER — MORPHINE SULFATE 4 MG/ML
4 INJECTION, SOLUTION INTRAMUSCULAR; INTRAVENOUS ONCE
Status: COMPLETED | OUTPATIENT
Start: 2017-02-21 | End: 2017-02-21

## 2017-02-21 RX ORDER — CEPHALEXIN 500 MG/1
500 CAPSULE ORAL ONCE
Status: COMPLETED | OUTPATIENT
Start: 2017-02-21 | End: 2017-02-21

## 2017-02-21 RX ORDER — SULFAMETHOXAZOLE AND TRIMETHOPRIM 800; 160 MG/1; MG/1
1 TABLET ORAL ONCE
Status: COMPLETED | OUTPATIENT
Start: 2017-02-21 | End: 2017-02-21

## 2017-02-21 RX ORDER — HYDROCODONE BITARTRATE AND ACETAMINOPHEN 5; 325 MG/1; MG/1
1-2 TABLET ORAL EVERY 4 HOURS PRN
Qty: 20 TAB | Refills: 0 | Status: SHIPPED | OUTPATIENT
Start: 2017-02-21 | End: 2017-03-28

## 2017-02-21 RX ORDER — PROMETHAZINE HYDROCHLORIDE 25 MG/1
25 TABLET ORAL EVERY 6 HOURS PRN
Qty: 15 TAB | Refills: 0 | Status: SHIPPED | OUTPATIENT
Start: 2017-02-21 | End: 2017-03-28

## 2017-02-21 RX ORDER — SULFAMETHOXAZOLE AND TRIMETHOPRIM 800; 160 MG/1; MG/1
1 TABLET ORAL 2 TIMES DAILY
Qty: 14 TAB | Refills: 0 | Status: SHIPPED | OUTPATIENT
Start: 2017-02-21 | End: 2017-02-28

## 2017-02-21 RX ORDER — CEPHALEXIN 500 MG/1
500 CAPSULE ORAL 4 TIMES DAILY
Qty: 28 CAP | Refills: 0 | Status: SHIPPED | OUTPATIENT
Start: 2017-02-21 | End: 2017-02-28

## 2017-02-21 RX ORDER — PROMETHAZINE HYDROCHLORIDE 25 MG/1
25 TABLET ORAL ONCE
Status: COMPLETED | OUTPATIENT
Start: 2017-02-21 | End: 2017-02-21

## 2017-02-21 RX ADMIN — PROMETHAZINE HYDROCHLORIDE 25 MG: 25 TABLET ORAL at 18:59

## 2017-02-21 RX ADMIN — CEPHALEXIN 500 MG: 500 CAPSULE ORAL at 18:59

## 2017-02-21 RX ADMIN — SULFAMETHOXAZOLE AND TRIMETHOPRIM 1 TABLET: 800; 160 TABLET ORAL at 18:59

## 2017-02-21 RX ADMIN — MORPHINE SULFATE 4 MG: 4 INJECTION INTRAVENOUS at 18:59

## 2017-02-21 ASSESSMENT — PAIN SCALES - GENERAL
PAINLEVEL_OUTOF10: 8
PAINLEVEL_OUTOF10: 1
PAINLEVEL_OUTOF10: 6

## 2017-02-21 ASSESSMENT — LIFESTYLE VARIABLES: DO YOU DRINK ALCOHOL: NO

## 2017-02-21 NOTE — ED NOTES
Abdominal pain protocol initiated.  Blood and BC x1 (hold) drawn and sent to lab.  Urine specimen cup and clean catch instructions given.  Apologized for wait and assured that she would be roomed as soon as possible.

## 2017-02-21 NOTE — ED AVS SNAPSHOT
Wacai Access Code: [email or web address]  Expires: 3/4/2017  8:41 AM    Your email address is not on file at [facility].  Email Addresses are required for you to sign up for Wacai, please contact [phone] to verify your personal information and to provide your email address prior to attempting to register for Wacai.    Alexis Osorio  [address] [place] [address]    Wacai  A secure, online tool to manage your health information     [employer]’s Wacai® is a secure, online tool that connects you to your personalized health information from the privacy of your home -- day or night - making it very easy for you to manage your healthcare. Once the activation process is completed, you can even access your medical information using the Wacai misbah, which is available for free in the Apple Misbah store or Google Play store.     To learn more about Wacai, visit [email or web address]    There are two levels of access available (as shown below):   My Chart Features  [facility] Primary Care Doctor [facility]  Specialists [facility]  Urgent  Care Non-[facility] Primary Care Doctor   Email your healthcare team securely and privately 24/7 X X X    Manage appointments: schedule your next appointment; view details of past/upcoming appointments X      Request prescription refills. X      View recent personal medical records, including lab and immunizations X X X X   View health record, including health history, allergies, medications X X X X   Read reports about your outpatient visits, procedures, consult and ER notes X X X X   See your discharge summary, which is a recap of your hospital and/or ER visit that includes your diagnosis, lab results, and care plan X X  X     How to register for [employer]:  Once your e-mail address has been verified, follow the following steps to sign up for Wacai.     1. Go to  [email or web address]  2. Click on the Sign Up Now box, which takes you to the New Member Sign Up page.  You will need to provide the following information:  a. Enter your [employer] Access Code exactly as it appears at the top of this page. (You will not need to use this code after you’ve completed the sign-up process. If you do not sign up before the expiration date, you must request a new code.)   b. Enter your date of birth.   c. Enter your home email address.   d. Click Submit, and follow the next screen’s instructions.  3. Create a [employer] ID. This will be your [employer] login ID and cannot be changed, so think of one that is secure and easy to remember.  4. Create a [employer] password. You can change your password at any time.  5. Enter your Password Reset Question and Answer. This can be used at a later time if you forget your password.   6. Enter your e-mail address. This allows you to receive e-mail notifications when new information is available in [employer].  7. Click Sign Up. You can now view your health information.    For assistance activating your [employer] account, call [phone]

## 2017-02-21 NOTE — ED AVS SNAPSHOT
2/21/2017          Alexis Osorio  [address]   [place] [address]    Dear Alexis:    [facility] wants to ensure your discharge home is safe and you or your loved ones have had all your questions answered regarding your care after you leave the hospital.    You may receive a telephone call within two days of your discharge.  This call is to make certain you understand your discharge instructions as well as ensure we provided you with the best care possible during your stay with us.     The call will only last approximately 3-5 minutes and will be done by a nurse.    Once again, we want to ensure your discharge home is safe and that you have a clear understanding of any next steps in your care.  If you have any questions or concerns, please do not hesitate to contact us, we are here for you.  Thank you for choosing [facility] for your healthcare needs.    Sincerely,    Topher Gar    [facility]

## 2017-02-21 NOTE — ED AVS SNAPSHOT
Home Care Instructions                                                                                                                Alexis Osorio   MRN: [id number]    Department:  [facility], Emergency Dept   Date of Visit:  2/21/2017            [facility], Emergency Dept    [address]    [place] [address]    Phone:  [phone]      You were seen by     Bryan Driver M.D.      Your Diagnosis Was     Generalized abdominal pain     R10.84       These are the medications you received during your hospitalization from 02/21/2017 1049 to 02/21/2017 1932     Date/Time Order Dose Route Action    02/21/2017 1859 morphine (pf) 4 mg/ml injection 4 mg 4 mg Intramuscular Given    02/21/2017 1859 promethazine (PHENERGAN) tablet 25 mg 25 mg Oral Given    02/21/2017 1859 cephALEXin (KEFLEX) capsule 500 mg 500 mg Oral Given    02/21/2017 1859 sulfamethoxazole-trimethoprim (BACTRIM DS) 800-160 MG tablet 1 Tab 1 Tab Oral Given      Follow-up Information     1. Follow up with Sweta Yanez M.D. In 3 days.    Specialty:  Gynecology    Why:  please take antibiotics as prescribed. use either zofran or phenergan for nausea. use norco/vicodin in place of percocet. return with any changes or worsening    Contact information    [address] [place] [address] [phone]        Medication Information     Review all of your home medications and newly ordered medications with your primary doctor and/or pharmacist as soon as possible. Follow medication instructions as directed by your doctor and/or pharmacist.     Please keep your complete medication list with you and share with your physician. Update the information when medications are discontinued, doses are changed, or new medications (including over-the-counter products) are added; and carry medication information at all times in the event of emergency situations.               Medication List      START taking these  medications        Instructions    cephALEXin 500 MG Caps   Commonly known as:  KEFLEX    Take 1 Cap by mouth 4 times a day for 7 days.   Dose:  500 mg       hydrocodone-acetaminophen 5-325 MG Tabs per tablet   Commonly known as:  NORCO    Take 1-2 Tabs by mouth every four hours as needed.   Dose:  1-2 Tab       promethazine 25 MG Tabs   Commonly known as:  PHENERGAN    Take 1 Tab by mouth every 6 hours as needed for Nausea/Vomiting.   Dose:  25 mg       sulfamethoxazole-trimethoprim 800-160 MG tablet   Commonly known as:  BACTRIM DS    Take 1 Tab by mouth 2 times a day for 7 days.   Dose:  1 Tab         ASK your doctor about these medications        Instructions    acetaminophen 500 MG Tabs   Commonly known as:  TYLENOL    Take 1,000 mg by mouth every 6 hours as needed.   Dose:  1000 mg       albuterol 108 (90 BASE) MCG/ACT Aers inhalation aerosol    Inhale 2 Puffs by mouth every 6 hours as needed for Shortness of Breath.   Dose:  2 Puff       LEXAPRO 20 MG tablet   Generic drug:  escitalopram    Take 20 mg by mouth every day.   Dose:  20 mg       MUCINEX ALLERGY PO    Take 2 Tabs by mouth as needed.   Dose:  2 Tab       multivitamin Tabs    Take 1 Tab by mouth every day.   Dose:  1 Tab       PROBIOTIC DAILY Caps    Take 1 Cap by mouth every day.   Dose:  1 Cap       tramadol 50 MG Tabs   Commonly known as:  ULTRAM    Take 50 mg by mouth every four hours as needed.   Dose:  50 mg               Procedures and tests performed during your visit     BLOOD CULTURE,HOLD    CARDIAC MONITORING    CBC WITH DIFFERENTIAL    COMP METABOLIC PANEL    ESTIMATED GFR    LIPASE    O2 Protocol    SALINE LOCK        Discharge Instructions       Abdominal Pain (Nonspecific)  Your exam might not show the exact reason you have abdominal pain. Since there are many different causes of abdominal pain, another checkup and more tests may be needed. It is very important to follow up for lasting (persistent) or worsening symptoms. A possible  cause of abdominal pain in any person who still has his or her appendix is acute appendicitis. Appendicitis is often hard to diagnose. Normal blood tests, urine tests, ultrasound, and CT scans do not completely rule out early appendicitis or other causes of abdominal pain. Sometimes, only the changes that happen over time will allow appendicitis and other causes of abdominal pain to be determined. Other potential problems that may require surgery may also take time to become more apparent. Because of this, it is important that you follow all of the instructions below.  HOME CARE INSTRUCTIONS   · Rest as much as possible.   · Do not eat solid food until your pain is gone.   · While adults or children have pain: A diet of water, weak decaffeinated tea, broth or bouillon, gelatin, oral rehydration solutions (ORS), frozen ice pops, or ice chips may be helpful.   · When pain is gone in adults or children: Start a light diet (dry toast, crackers, applesauce, or white rice). Increase the diet slowly as long as it does not bother you. Eat no dairy products (including cheese and eggs) and no spicy, fatty, fried, or high-fiber foods.   · Use no alcohol, caffeine, or cigarettes.   · Take your regular medicines unless your caregiver told you not to.   · Take any prescribed medicine as directed.   · Only take over-the-counter or prescription medicines for pain, discomfort, or fever as directed by your caregiver. Do not give aspirin to children.   If your caregiver has given you a follow-up appointment, it is very important to keep that appointment. Not keeping the appointment could result in a permanent injury and/or lasting (chronic) pain and/or disability. If there is any problem keeping the appointment, you must call to reschedule.   SEEK IMMEDIATE MEDICAL CARE IF:   · Your pain is not gone in 24 hours.   · Your pain becomes worse, changes location, or feels different.   · You or your child has an oral temperature above 102°  F (38.9° C), not controlled by medicine.   · Your baby is older than 3 months with a rectal temperature of 102° F (38.9° C) or higher.   · Your baby is 3 months old or younger with a rectal temperature of 100.4° F (38° C) or higher.   · You have shaking chills.   · You keep throwing up (vomiting) or cannot drink liquids.   · There is blood in your vomit or you see blood in your bowel movements.   · Your bowel movements become dark or black.   · You have frequent bowel movements.   · Your bowel movements stop (become blocked) or you cannot pass gas.   · You have bloody, frequent, or painful urination.   · You have yellow discoloration in the skin or whites of the eyes.   · Your stomach becomes bloated or bigger.   · You have dizziness or fainting.   · You have chest or back pain.   MAKE SURE YOU:   · Understand these instructions.   · Will watch your condition.   · Will get help right away if you are not doing well or get worse.   Document Released: 12/18/2006 Document Revised: 03/11/2013 Document Reviewed: 11/15/2010  ExitCare® Patient Information ©2013 [employer].          Patient Information     Patient Information    Following emergency treatment: all patient requiring follow-up care must return either to a private physician or a clinic if your condition worsens before you are able to obtain further medical attention, please return to the emergency room.     Billing Information    At [facility], we work to make the billing process streamlined for our patients.  Our Representatives are here to answer any questions you may have regarding your hospital bill.  If you have insurance coverage and have supplied your insurance information to us, we will submit a claim to your insurer on your behalf.  Should you have any questions regarding your bill, we can be reached online or by phone as follows:  Online: You are able pay your bills online or live chat with our representatives about any billing questions you may  have. We are here to help Monday - Friday from 8:00am to 7:30pm and 9:00am - 12:00pm on Saturdays.  Please visit [email or web address]  for more information.   Phone:  [phone] or [phone]    Please note that your emergency physician, surgeon, pathologist, radiologist, anesthesiologist, and other specialists are not employed by [facility] and will therefore bill separately for their services.  Please contact them directly for any questions concerning their bills at the numbers below:     Emergency Physician Services:  [phone]  [facility]:  [phone]  [facility]:  [phone]  [facility]:  [phone]    1. Your final bill may vary from the amount quoted upon discharge if all procedures are not complete at that time, or if your doctor has additional procedures of which we are not aware. You will receive an additional bill if you return to the Emergency Department at [facility] for suture removal regardless of the facility of which the sutures were placed.     2. Please arrange for settlement of this account at the emergency registration.    3. All self-pay accounts are due in full at the time of treatment.  If you are unable to meet this obligation then payment is expected within 4-5 days.     4. If you have had radiology studies (CT, X-ray, Ultrasound, MRI), you have received a preliminary result during your emergency department visit. Please contact the radiology department [phone] to receive a copy of your final result. Please discuss the Final result with your primary physician or with the follow up physician provided.     Crisis Hotline:  [place] Crisis Hotline:  5-330-QCNZHGF or 1-908.251.3438  [place] Crisis Hotline:    1-358.839.4487 or 345-601-8315         ED Discharge Follow Up Questions    1. In order to provide you with very good care, we would like to follow up with a phone call in the next few  days.  May we have your permission to contact you?     YES /  NO    2. What is the best phone number to call you? (       )_____-__________    3. What is the best time to call you?      Morning  /  Afternoon  /  Evening                   Patient Signature:  ____________________________________________________________    Date:  ____________________________________________________________

## 2017-02-21 NOTE — ED NOTES
Pt to triage c/o abd pain, n/v and yellow discharge from pt's surgical site. Pt states that she had a laparoscopy 1 week go and was sent by Dr. Chacon for further evaluation.

## 2017-02-22 NOTE — ED PROVIDER NOTES
"ED Provider Note    Scribed for Bryan Driver M.D. by Joi Schilling. 2/21/2017, 6:28 PM.    Primary care provider: Luis Sofia D.O.  Means of arrival: Ambulance  History obtained from: Patient  History limited by: None    CHIEF COMPLAINT  Chief Complaint   Patient presents with   • Abdominal Pain   • Post-Op Complications     \"yellow discharge from surgical site\"   • N/V       HPI  Alexis Osorio is a 21 y.o. female who presents to the Emergency Department for abdominal pain. Patient reports she was prescribed Percocet after the laparoscopic surgery and since has been feeling nauseated. She also notes drainage from her belly button and reports generalized abdominal pain around her incision site. She denies any other medical problems or the use of new medications. Patient reports that she has been having normal bowel movements. Denies dysuria.     REVIEW OF SYSTEMS  See HPI for further details. All other systems are negative. E    PAST MEDICAL HISTORY   has a past medical history of Axillary abscess (9/30/2012); Asthma; Anxiety; Breath shortness; Snoring; Gynecological disorder; Cold; Pain; Psychiatric problem; Pneumonia (2015); and Anesthesia.    SURGICAL HISTORY   has past surgical history that includes appendectomy (2008) and pelviscopy (N/A, 2/14/2017).    SOCIAL HISTORY  Social History   Substance Use Topics   • Smoking status: Light Tobacco Smoker -- 0.25 packs/day for 5 years     Types: Cigarettes   • Smokeless tobacco: Never Used      Comment: quit 1 week ago   • Alcohol Use: No      History   Drug Use   • Yes     Comment: marijuana-daily, Last smoked 2/11/2017       FAMILY HISTORY  History reviewed. No pertinent family history.    CURRENT MEDICATIONS  Reviewed.  See Encounter Summary.     ALLERGIES  No Known Allergies    PHYSICAL EXAM  VITAL SIGNS: /74 mmHg  Pulse 78  Temp(Src) 36.2 °C (97.2 °F)  Resp 15  Ht 1.676 m (5' 6\")  Wt 90.719 kg (200 lb)  BMI 32.30 kg/m2  SpO2 98%  LMP " 02/13/2017    Constitutional: Alert in no apparent distress. Well appearing.   HENT: No signs of trauma, Bilateral external ears normal, Nose normal.   Eyes: Pupils are equal and reactive, Conjunctiva normal, Non-icteric.   Neck: Normal range of motion, No tenderness, Supple, No stridor.   Lymphatic: No lymphadenopathy noted.   Cardiovascular: Regular rate and rhythm, no murmurs.   Thorax & Lungs: Normal breath sounds, No respiratory distress, No wheezing, No chest tenderness.   Abdomen: Bowel sounds normal, Soft, minimal diffuse tenderness with deep palpation. Laparoscopic incision to umbilicus with minimal clear to yellow drainage. Well healing glue incision to mons. No masses, No pulsatile masses. No peritoneal signs. No obvious cellulitic changes.  Skin: Warm, Dry, No erythema, No rash.   Back: No bony tenderness, No CVA tenderness.   Extremities: Intact distal pulses, No edema, No tenderness, No cyanosis  Musculoskeletal: Good range of motion in all major joints. No tenderness to palpation or major deformities noted.   Neurologic: Alert , Normal motor function, Normal sensory function, No focal deficits noted.   Psychiatric: Affect normal, Judgment normal, Mood normal.     DIAGNOSTIC STUDIES / PROCEDURES     LABS  Results for orders placed or performed during the hospital encounter of 02/21/17   CBC WITH DIFFERENTIAL   Result Value Ref Range    WBC 10.6 4.8 - 10.8 K/uL    RBC 4.58 4.20 - 5.40 M/uL    Hemoglobin 14.5 12.0 - 16.0 g/dL    Hematocrit 42.1 37.0 - 47.0 %    MCV 91.9 81.4 - 97.8 fL    MCH 31.7 27.0 - 33.0 pg    MCHC 34.4 33.6 - 35.0 g/dL    RDW 41.2 35.9 - 50.0 fL    Platelet Count 357 164 - 446 K/uL    MPV 8.9 (L) 9.0 - 12.9 fL    Neutrophils-Polys 73.30 (H) 44.00 - 72.00 %    Lymphocytes 20.00 (L) 22.00 - 41.00 %    Monocytes 5.50 0.00 - 13.40 %    Eosinophils 0.40 0.00 - 6.90 %    Basophils 0.40 0.00 - 1.80 %    Immature Granulocytes 0.40 0.00 - 0.90 %    Nucleated RBC 0.00 /100 WBC    Neutrophils  (Absolute) 7.80 (H) 2.00 - 7.15 K/uL    Lymphs (Absolute) 2.12 1.00 - 4.80 K/uL    Monos (Absolute) 0.58 0.00 - 0.85 K/uL    Eos (Absolute) 0.04 0.00 - 0.51 K/uL    Baso (Absolute) 0.04 0.00 - 0.12 K/uL    Immature Granulocytes (abs) 0.04 0.00 - 0.11 K/uL    NRBC (Absolute) 0.00 K/uL   COMP METABOLIC PANEL   Result Value Ref Range    Sodium 136 135 - 145 mmol/L    Potassium 3.8 3.6 - 5.5 mmol/L    Chloride 104 96 - 112 mmol/L    Co2 22 20 - 33 mmol/L    Anion Gap 10.0 0.0 - 11.9    Glucose 102 (H) 65 - 99 mg/dL    Bun 9 8 - 22 mg/dL    Creatinine 0.51 0.50 - 1.40 mg/dL    Calcium 9.9 8.5 - 10.5 mg/dL    AST(SGOT) 18 12 - 45 U/L    ALT(SGPT) 34 2 - 50 U/L    Alkaline Phosphatase 85 30 - 99 U/L    Total Bilirubin 0.4 0.1 - 1.5 mg/dL    Albumin 4.1 3.2 - 4.9 g/dL    Total Protein 8.4 (H) 6.0 - 8.2 g/dL    Globulin 4.3 (H) 1.9 - 3.5 g/dL    A-G Ratio 1.0 g/dL   LIPASE   Result Value Ref Range    Lipase 5 (L) 11 - 82 U/L   ESTIMATED GFR   Result Value Ref Range    GFR If African American >60 >60 mL/min/1.73 m 2    GFR If Non African American >60 >60 mL/min/1.73 m 2   BLOOD CULTURE,HOLD   Result Value Ref Range    Blood Culture Hold Collected      All labs reviewed by me.     COURSE & MEDICAL DECISION MAKING  Pertinent Labs & Imaging studies reviewed. (See chart for details)    6:15 PM - I reviewed the patients previous medical records which indicated an exploratory laparoscopic procedure for presumed endometriosis performed by Dr. Yanez on 2/14. There was some evidence of endometriosis in the posterior cul-de-sac and left ovarian fossa.     6:28 PM - Patient seen and examined at bedside. I discussed with the mother and the patient that her pain is not abnormal as it has only been a week since her procedure. Mother is requesting pain meds and an ultrasound at this time. I discussed other options for her treatment care plan as I don't feel it is necessary to proceed with an US. I further addressed any questions and  concerns. The treatment care plan was discussed which is to proceed with pain medications and antibiotics for her symptoms and then discharge. Patient will be treated with morphine 4 mg, Phenegan 25 mg, Keflex 500 mg, Bactrim DS 1 tab. Ordered POC urine pregnancy, CBC with differential, CMP, lipase, POC urinalysis, estimated GFR, blood culture to evaluate her symptoms.     Decision Making:  This is a 21 y.o. year old female who presents with postoperative abdominal pain and incisional drainage. History and physical exam as above. Altered evaluation large unremarkable. Patient feeling better at this time. I discussed the possibilities of CT imaging to further evaluate for possible postoperative complications however the patient wishes to forego this imaging at this time given the fact that she is feeling more comfortable. She will follow up with her surgeon as discussed and planned. She is understanding of ongoing outpatient care instructions and will return to the ER if any changes or worsening in the interim.    I reviewed prescription monitoring program for patient's narcotic use before prescribing a scheduled drug.The patient will not drink alcohol nor drive with prescribed medications. The patient will return for new or worsening symptoms and is stable at the time of discharge.    The patient is referred to a primary physician for blood pressure management, diabetic screening, and for all other preventative health concerns.    DISPOSITION:  Patient will be discharged home in stable condition.    FOLLOW UP:  Sweta Yanez M.D.  [address] [place] [address]  [phone]    In 3 days  please take antibiotics as prescribed. use either zofran or phenergan for nausea. use norco/vicodin in place of percocet. return with any changes or worsening      OUTPATIENT MEDICATIONS:  New Prescriptions    CEPHALEXIN (KEFLEX) 500 MG CAP    Take 1 Cap by mouth 4 times a day for 7 days.     HYDROCODONE-ACETAMINOPHEN (NORCO) 5-325 MG TAB PER TABLET    Take 1-2 Tabs by mouth every four hours as needed.    PROMETHAZINE (PHENERGAN) 25 MG TAB    Take 1 Tab by mouth every 6 hours as needed for Nausea/Vomiting.    SULFAMETHOXAZOLE-TRIMETHOPRIM (BACTRIM DS) 800-160 MG TABLET    Take 1 Tab by mouth 2 times a day for 7 days.       FINAL IMPRESSION  1. Generalized abdominal pain          Joi PALOMO (Yenny), am scribing for, and in the presence of, Bryan Driver M.D..    Electronically signed by: Joi Schilling (Mikeyibe), 2/21/2017    IBryan M.D. personally performed the services described in this documentation, as scribed by Joi Schilling in my presence, and it is both accurate and complete.    The note accurately reflects work and decisions made by me.  Bryan Driver  2/23/2017  2:30 AM

## 2017-02-22 NOTE — DISCHARGE INSTRUCTIONS
Abdominal Pain (Nonspecific)  Your exam might not show the exact reason you have abdominal pain. Since there are many different causes of abdominal pain, another checkup and more tests may be needed. It is very important to follow up for lasting (persistent) or worsening symptoms. A possible cause of abdominal pain in any person who still has his or her appendix is acute appendicitis. Appendicitis is often hard to diagnose. Normal blood tests, urine tests, ultrasound, and CT scans do not completely rule out early appendicitis or other causes of abdominal pain. Sometimes, only the changes that happen over time will allow appendicitis and other causes of abdominal pain to be determined. Other potential problems that may require surgery may also take time to become more apparent. Because of this, it is important that you follow all of the instructions below.  HOME CARE INSTRUCTIONS   · Rest as much as possible.   · Do not eat solid food until your pain is gone.   · While adults or children have pain: A diet of water, weak decaffeinated tea, broth or bouillon, gelatin, oral rehydration solutions (ORS), frozen ice pops, or ice chips may be helpful.   · When pain is gone in adults or children: Start a light diet (dry toast, crackers, applesauce, or white rice). Increase the diet slowly as long as it does not bother you. Eat no dairy products (including cheese and eggs) and no spicy, fatty, fried, or high-fiber foods.   · Use no alcohol, caffeine, or cigarettes.   · Take your regular medicines unless your caregiver told you not to.   · Take any prescribed medicine as directed.   · Only take over-the-counter or prescription medicines for pain, discomfort, or fever as directed by your caregiver. Do not give aspirin to children.   If your caregiver has given you a follow-up appointment, it is very important to keep that appointment. Not keeping the appointment could result in a permanent injury and/or lasting (chronic) pain  and/or disability. If there is any problem keeping the appointment, you must call to reschedule.   SEEK IMMEDIATE MEDICAL CARE IF:   · Your pain is not gone in 24 hours.   · Your pain becomes worse, changes location, or feels different.   · You or your child has an oral temperature above 102° F (38.9° C), not controlled by medicine.   · Your baby is older than 3 months with a rectal temperature of 102° F (38.9° C) or higher.   · Your baby is 3 months old or younger with a rectal temperature of 100.4° F (38° C) or higher.   · You have shaking chills.   · You keep throwing up (vomiting) or cannot drink liquids.   · There is blood in your vomit or you see blood in your bowel movements.   · Your bowel movements become dark or black.   · You have frequent bowel movements.   · Your bowel movements stop (become blocked) or you cannot pass gas.   · You have bloody, frequent, or painful urination.   · You have yellow discoloration in the skin or whites of the eyes.   · Your stomach becomes bloated or bigger.   · You have dizziness or fainting.   · You have chest or back pain.   MAKE SURE YOU:   · Understand these instructions.   · Will watch your condition.   · Will get help right away if you are not doing well or get worse.   Document Released: 12/18/2006 Document Revised: 03/11/2013 Document Reviewed: 11/15/2010  ExitCare® Patient Information ©2013 [employer].

## 2017-02-22 NOTE — ED NOTES
Patient reports good pain and nausea relief from medications given. Patient states readiness for discharge. Discharge instructions, prescriptions x 4, work note, and follow-up care reviewed with patient. All questions answered and patient verbalized understanding. Vss. Patient stable and ambulatory upon d/c from ED.

## 2017-03-28 ENCOUNTER — APPOINTMENT (OUTPATIENT)
Dept: ADMISSIONS | Facility: MEDICAL CENTER | Age: 22
End: 2017-03-28
Attending: OTOLARYNGOLOGY
Payer: COMMERCIAL

## 2017-03-28 RX ORDER — IBUPROFEN 200 MG
TABLET ORAL PRN
COMMUNITY
End: 2017-06-20

## 2017-03-31 ENCOUNTER — HOSPITAL ENCOUNTER (OUTPATIENT)
Facility: MEDICAL CENTER | Age: 22
End: 2017-03-31
Attending: OTOLARYNGOLOGY | Admitting: OTOLARYNGOLOGY
Payer: COMMERCIAL

## 2017-03-31 VITALS
WEIGHT: 205.91 LBS | DIASTOLIC BLOOD PRESSURE: 70 MMHG | OXYGEN SATURATION: 97 % | TEMPERATURE: 98.1 F | SYSTOLIC BLOOD PRESSURE: 119 MMHG | HEART RATE: 87 BPM | BODY MASS INDEX: 33.09 KG/M2 | HEIGHT: 66 IN | RESPIRATION RATE: 16 BRPM

## 2017-03-31 PROBLEM — J35.01 CHRONIC TONSILLITIS: Status: ACTIVE | Noted: 2017-03-31

## 2017-03-31 LAB — HCG SERPL QL: NEGATIVE

## 2017-03-31 PROCEDURE — 502573 HCHG PACK, ENT: Performed by: OTOLARYNGOLOGY

## 2017-03-31 PROCEDURE — 700102 HCHG RX REV CODE 250 W/ 637 OVERRIDE(OP)

## 2017-03-31 PROCEDURE — 501424 HCHG SPONGE, TONSIL: Performed by: OTOLARYNGOLOGY

## 2017-03-31 PROCEDURE — 500122 HCHG BOVIE, BLADE: Performed by: OTOLARYNGOLOGY

## 2017-03-31 PROCEDURE — 160048 HCHG OR STATISTICAL LEVEL 1-5: Performed by: OTOLARYNGOLOGY

## 2017-03-31 PROCEDURE — 110382 HCHG SHELL REV 271: Performed by: OTOLARYNGOLOGY

## 2017-03-31 PROCEDURE — 84703 CHORIONIC GONADOTROPIN ASSAY: CPT

## 2017-03-31 PROCEDURE — A4606 OXYGEN PROBE USED W OXIMETER: HCPCS | Performed by: OTOLARYNGOLOGY

## 2017-03-31 PROCEDURE — 160027 HCHG SURGERY MINUTES - 1ST 30 MINS LEVEL 2: Performed by: OTOLARYNGOLOGY

## 2017-03-31 PROCEDURE — 160009 HCHG ANES TIME/MIN: Performed by: OTOLARYNGOLOGY

## 2017-03-31 PROCEDURE — A9270 NON-COVERED ITEM OR SERVICE: HCPCS

## 2017-03-31 PROCEDURE — 700111 HCHG RX REV CODE 636 W/ 250 OVERRIDE (IP)

## 2017-03-31 PROCEDURE — 500123 HCHG BOVIE, CONTROL W/BLADE: Performed by: OTOLARYNGOLOGY

## 2017-03-31 PROCEDURE — 502240 HCHG MISC OR SUPPLY RC 0272: Performed by: OTOLARYNGOLOGY

## 2017-03-31 PROCEDURE — 160035 HCHG PACU - 1ST 60 MINS PHASE I: Performed by: OTOLARYNGOLOGY

## 2017-03-31 PROCEDURE — 88304 TISSUE EXAM BY PATHOLOGIST: CPT | Mod: 59

## 2017-03-31 PROCEDURE — 160036 HCHG PACU - EA ADDL 30 MINS PHASE I: Performed by: OTOLARYNGOLOGY

## 2017-03-31 PROCEDURE — 160002 HCHG RECOVERY MINUTES (STAT): Performed by: OTOLARYNGOLOGY

## 2017-03-31 PROCEDURE — 500125 HCHG BOVIE, HANDLE: Performed by: OTOLARYNGOLOGY

## 2017-03-31 PROCEDURE — 501838 HCHG SUTURE GENERAL: Performed by: OTOLARYNGOLOGY

## 2017-03-31 PROCEDURE — 110371 HCHG SHELL REV 272: Performed by: OTOLARYNGOLOGY

## 2017-03-31 PROCEDURE — 700101 HCHG RX REV CODE 250

## 2017-03-31 RX ORDER — ONDANSETRON 4 MG/1
4 TABLET, ORALLY DISINTEGRATING ORAL EVERY 8 HOURS PRN
Qty: 20 TAB | Refills: 0 | Status: SHIPPED | OUTPATIENT
Start: 2017-03-31 | End: 2017-06-20

## 2017-03-31 RX ORDER — DIPHENHYDRAMINE HYDROCHLORIDE 50 MG/ML
INJECTION INTRAMUSCULAR; INTRAVENOUS
Status: DISCONTINUED
Start: 2017-03-31 | End: 2017-03-31 | Stop reason: HOSPADM

## 2017-03-31 RX ORDER — SODIUM CHLORIDE, SODIUM LACTATE, POTASSIUM CHLORIDE, CALCIUM CHLORIDE 600; 310; 30; 20 MG/100ML; MG/100ML; MG/100ML; MG/100ML
INJECTION, SOLUTION INTRAVENOUS CONTINUOUS
Status: DISCONTINUED | OUTPATIENT
Start: 2017-03-31 | End: 2017-03-31 | Stop reason: HOSPADM

## 2017-03-31 RX ORDER — ONDANSETRON 2 MG/ML
4 INJECTION INTRAMUSCULAR; INTRAVENOUS EVERY 6 HOURS PRN
Status: DISCONTINUED | OUTPATIENT
Start: 2017-03-31 | End: 2017-03-31 | Stop reason: HOSPADM

## 2017-03-31 RX ORDER — MORPHINE SULFATE 4 MG/ML
1-4 INJECTION, SOLUTION INTRAMUSCULAR; INTRAVENOUS
Status: DISCONTINUED | OUTPATIENT
Start: 2017-03-31 | End: 2017-03-31 | Stop reason: HOSPADM

## 2017-03-31 RX ORDER — OXYCODONE HCL 5 MG/5 ML
SOLUTION, ORAL ORAL
Status: COMPLETED
Start: 2017-03-31 | End: 2017-03-31

## 2017-03-31 RX ORDER — SODIUM CHLORIDE, SODIUM LACTATE, POTASSIUM CHLORIDE, CALCIUM CHLORIDE 600; 310; 30; 20 MG/100ML; MG/100ML; MG/100ML; MG/100ML
1000 INJECTION, SOLUTION INTRAVENOUS ONCE
Status: COMPLETED | OUTPATIENT
Start: 2017-03-31 | End: 2017-03-31

## 2017-03-31 RX ORDER — HYDROCODONE BITARTRATE AND ACETAMINOPHEN 5; 325 MG/1; MG/1
1-2 TABLET ORAL EVERY 4 HOURS PRN
Qty: 40 TAB | Refills: 0 | Status: SHIPPED | OUTPATIENT
Start: 2017-03-31 | End: 2017-06-20

## 2017-03-31 RX ORDER — AMOXICILLIN 250 MG/5ML
500 POWDER, FOR SUSPENSION ORAL 2 TIMES DAILY
Qty: 150 ML | Refills: 0 | Status: SHIPPED | OUTPATIENT
Start: 2017-03-31 | End: 2017-04-05

## 2017-03-31 RX ADMIN — SODIUM CHLORIDE, SODIUM LACTATE, POTASSIUM CHLORIDE, CALCIUM CHLORIDE 1000 ML: 600; 310; 30; 20 INJECTION, SOLUTION INTRAVENOUS at 08:15

## 2017-03-31 RX ADMIN — FENTANYL CITRATE 25 MCG: 50 INJECTION, SOLUTION INTRAMUSCULAR; INTRAVENOUS at 10:30

## 2017-03-31 RX ADMIN — FENTANYL CITRATE 50 MCG: 50 INJECTION, SOLUTION INTRAMUSCULAR; INTRAVENOUS at 10:48

## 2017-03-31 RX ADMIN — OXYCODONE HYDROCHLORIDE 10 MG: 5 SOLUTION ORAL at 10:30

## 2017-03-31 ASSESSMENT — PAIN SCALES - GENERAL
PAINLEVEL_OUTOF10: 0
PAINLEVEL_OUTOF10: 2
PAINLEVEL_OUTOF10: 7
PAINLEVEL_OUTOF10: 0
PAINLEVEL_OUTOF10: 3
PAINLEVEL_OUTOF10: 5
PAINLEVEL_OUTOF10: 3
PAINLEVEL_OUTOF10: 3

## 2017-03-31 NOTE — OP REPORT
DATE OF OPERATION: 3/31/2017     PREOPERATIVE DIAGNOSIS: Chronic Tonsillitis    POSTOPERATIVE DIAGNOSIS: Same    PROCEDURE PERFORMED:Tonsillectomy     SURGEON: Ilya Escalona MD       ANESTHESIA: General endotracheal anesthesia.     INDICATIONS: The patient is a 21 y.o.-year-old female with chronic strep tonsillitis. She  is taken to the operating room for tonsillectomy.     FINDINGS: The tonsils were 2+.    SPECIMEN: Bilateral Tonsils.     ESTIMATED BLOOD LOSS: <1 mL.     PROCEDURE:  Informed consent and procedure was verified in a time out prior to beginning the case.  Patient was intubated by anesthesia.  Once adequate levels were achieved, head of bed was rotated 90 degrees towards the surgeon.  Head drape and eye protection was placed.  Oral cavity retractor was placed and pt was placed under suspension.  Soft palate and uvula were visualized and found not be by bifid or with any submucosal clefting.  Bilateral red rubber catheters were placed thru each nare and brought around the soft palate for further retraction.  First the left tonsil was addressed.  Grasping the superior pole and retracting medially, the tonsil was carefully resected from a superior to inferior fashion with bovie electrocautery at 12 elmore until it was transected at the inferior pole and sent off for specimen.  Next, the right tonsil was addressed, and in similar fashion was removed without difficulty.  Bilateral tonsillar beds were readdressed for bleeding.  Any bleeding was spot electrocaughterized to achieve meticulous hemostasis.      Once this was performed, my portion of the procedure was terminated, pt was taken off suspension, oral cavity retractor, head drape, and red rubber catheters were removed, and pt was turned over to anesthesia for emergence.     The patient tolerated the procedure well and there were no apparent complication. All sponge, needle, and instrument counts were correct on 2 separate occasions. She  was awakened,  extubated, and transferred to the recovery room in satisfactory condition.         ____________________________________   Ilya Escalona MD       DD: 3/31/2017  10:22 AM

## 2017-03-31 NOTE — IP AVS SNAPSHOT
3/31/2017          Alexis Osorio  [address]  [address]  [facility] [address]    Dear Alexis:    [facility] wants to ensure your discharge home is safe and you or your loved ones have had all your questions answered regarding your care after you leave the hospital.    You may receive a telephone call within two days of your discharge.  This call is to make certain you understand your discharge instructions as well as ensure we provided you with the best care possible during your stay with us.     The call will only last approximately 3-5 minutes and will be done by a nurse.    Once again, we want to ensure your discharge home is safe and that you have a clear understanding of any next steps in your care.  If you have any questions or concerns, please do not hesitate to contact us, we are here for you.  Thank you for choosing [facility] for your healthcare needs.    Sincerely,    Topher Gar    [facility]

## 2017-03-31 NOTE — DISCHARGE INSTRUCTIONS
ACTIVITY: Rest and take it easy for the first 24 hours.  A responsible adult is recommended to remain with you during that time.  It is normal to feel sleepy.  We encourage you to not do anything that requires balance, judgment or coordination.    MILD FLU-LIKE SYMPTOMS ARE NORMAL. YOU MAY EXPERIENCE GENERALIZED MUSCLE ACHES, THROAT IRRITATION, HEADACHE AND/OR SOME NAUSEA.    FOR 24 HOURS DO NOT:  Drive, operate machinery or run household appliances.  Drink beer or alcoholic beverages.   Make important decisions or sign legal documents.    SPECIAL INSTRUCTIONS: *SEE INSTRUCTION SHEET, CALL THE DOCTOR FOR ANY CONCERNS. DRINK PLENTY OF FLUIDS.**    DIET: To avoid nausea, slowly advance diet as tolerated, avoiding spicy or greasy foods for the first day.  Add more substantial food to your diet according to your physician's instructions.  Babies can be fed formula or breast milk as soon as they are hungry.  INCREASE FLUIDS AND FIBER TO AVOID CONSTIPATION.    SURGICAL DRESSING/BATHING: *MAY SHOWER TOMORROW.**    FOLLOW-UP APPOINTMENT:  A follow-up appointment should be arranged with your doctor in *CALL TO SCHEDULE**; call to schedule.    You should CALL YOUR PHYSICIAN if you develop:  Fever greater than 101 degrees F.  Pain not relieved by medication, or persistent nausea or vomiting.  Excessive bleeding (blood soaking through dressing) or unexpected drainage from the wound.  Extreme redness or swelling around the incision site, drainage of pus or foul smelling drainage.  Inability to urinate or empty your bladder within 8 hours.  Problems with breathing or chest pain.    You should call 911 if you develop problems with breathing or chest pain.  If you are unable to contact your doctor or surgical center, you should go to the nearest emergency room or urgent care center.  Physician's telephone #: *DR VÁSQUEZ [phone]**    If any questions arise, call your doctor.  If your doctor is not available, please feel free to  call the Surgical Center at [phone].  The Center is open Monday through Friday from 7AM to 7PM.  You can also call the HEALTH HOTLINE open 24 hours/day, 7 days/week and speak to a nurse at [phone], or toll free at [phone].    A registered nurse may call you a few days after your surgery to see how you are doing after your procedure.    MEDICATIONS: Resume taking daily medication.  Take prescribed pain medication with food.  If no medication is prescribed, you may take non-aspirin pain medication if needed.  PAIN MEDICATION CAN BE VERY CONSTIPATING.  Take a stool softener or laxative such as senokot, pericolace, or milk of magnesia if needed.    Prescription given for *NORCO, ZOFRAN, AND AMOXIL**.  Last pain medication given at *10:30AM**.    If your physician has prescribed pain medication that includes Acetaminophen (Tylenol), do not take additional Acetaminophen (Tylenol) while taking the prescribed medication.    Depression / Suicide Risk    As you are discharged from this [facility] facility, it is important to learn how to keep safe from harming yourself.    Recognize the warning signs:  · Abrupt changes in personality, positive or negative- including increase in energy   · Giving away possessions  · Change in eating patterns- significant weight changes-  positive or negative  · Change in sleeping patterns- unable to sleep or sleeping all the time   · Unwillingness or inability to communicate  · Depression  · Unusual sadness, discouragement and loneliness  · Talk of wanting to die  · Neglect of personal appearance   · Rebelliousness- reckless behavior  · Withdrawal from people/activities they love  · Confusion- inability to concentrate     If you or a loved one observes any of these behaviors or has concerns about self-harm, here's what you can do:  · Talk about it- your feelings and reasons for harming yourself  · Remove any means that you might use to hurt yourself (examples: pills, rope,  extension cords, firearm)  · Get professional help from the community (Mental Health, Substance Abuse, psychological counseling)  · Do not be alone:Call your Safe Contact- someone whom you trust who will be there for you.  · Call your local CRISIS HOTLINE 505-7740 or 387-758-8210  · Call your local Children's Mobile Crisis Response Team [place] (934) 624-3668 or www.[place].ru  · Call the toll free National Suicide Prevention Hotlines   · National Suicide Prevention Lifeline 245-110-VCWP (0077)  · National Hope Line Network 800-SUICIDE (141-6922)

## 2017-03-31 NOTE — OR NURSING
1024  Received pt from the OR with Dr Mujica, pt resting, VSS, in no signs of distress. Pt aware of POC.    1030 Pt complains of pain, medicated.    1048 Pt complains of pain, medicated    1110 Pt up to the BR, able to void without difficulty.    1140 Dr martinez in to speak with patient, all questions/concerns addressed.    1200 Pt resting with eyes closed, appears comfortable.    1208 Pt up to bathroom to void. Able to ambulate to bathroom without difficulty. Back of throat visualized, no bleeding to note.    1250 Pt resting, family at side, updated on POC and estimated time of discharge. Eating popsicle.    1300 Discharge instructions given to pt and family, both verbalize understanding.   Pt meets discharge criteria. Able to dress and steady on feet.    1325 Pt discharged, ambulatory to car.  All questions/concerns addressed.

## 2017-03-31 NOTE — IP AVS SNAPSHOT
" Home Care Instructions                                                                                                                Name:Alexis Osorio  Medical Record Number:[id number]  CSN: [id number]    YOB: 1995   Age: 21 y.o.  Sex: female  HT:1.676 m (5' 6\") (75 %, Z = 0.67, Source: [facility] 2-20 Years) WT: 93.4 kg (205 lb 14.6 oz) (98 %, Z = 2.03, Source: [facility] 2-20 Years)          Admit Date: 3/31/2017     Discharge Date:   Today's Date: 3/31/2017  Attending Doctor:  Ilya Escalona M.D.                  Allergies:  Review of patient's allergies indicates no known allergies.                Discharge Instructions         ACTIVITY: Rest and take it easy for the first 24 hours.  A responsible adult is recommended to remain with you during that time.  It is normal to feel sleepy.  We encourage you to not do anything that requires balance, judgment or coordination.    MILD FLU-LIKE SYMPTOMS ARE NORMAL. YOU MAY EXPERIENCE GENERALIZED MUSCLE ACHES, THROAT IRRITATION, HEADACHE AND/OR SOME NAUSEA.    FOR 24 HOURS DO NOT:  Drive, operate machinery or run household appliances.  Drink beer or alcoholic beverages.   Make important decisions or sign legal documents.    SPECIAL INSTRUCTIONS: *SEE INSTRUCTION SHEET, CALL THE DOCTOR FOR ANY CONCERNS. DRINK PLENTY OF FLUIDS.**    DIET: To avoid nausea, slowly advance diet as tolerated, avoiding spicy or greasy foods for the first day.  Add more substantial food to your diet according to your physician's instructions.  Babies can be fed formula or breast milk as soon as they are hungry.  INCREASE FLUIDS AND FIBER TO AVOID CONSTIPATION.    SURGICAL DRESSING/BATHING: *MAY SHOWER TOMORROW.**    FOLLOW-UP APPOINTMENT:  A follow-up appointment should be arranged with your doctor in *CALL TO SCHEDULE**; call to schedule.    You should CALL YOUR PHYSICIAN if you develop:  Fever greater than 101 degrees F.  Pain not relieved by medication, or persistent nausea or " vomiting.  Excessive bleeding (blood soaking through dressing) or unexpected drainage from the wound.  Extreme redness or swelling around the incision site, drainage of pus or foul smelling drainage.  Inability to urinate or empty your bladder within 8 hours.  Problems with breathing or chest pain.    You should call 911 if you develop problems with breathing or chest pain.  If you are unable to contact your doctor or surgical center, you should go to the nearest emergency room or urgent care center.  Physician's telephone #: *DR VÁSQUEZ [phone]**    If any questions arise, call your doctor.  If your doctor is not available, please feel free to call the Surgical Center at [phone].  The Center is open Monday through Friday from 7AM to 7PM.  You can also call the [employer] HOTLINE open 24 hours/day, 7 days/week and speak to a nurse at [phone], or toll free at [phone].    A registered nurse may call you a few days after your surgery to see how you are doing after your procedure.    MEDICATIONS: Resume taking daily medication.  Take prescribed pain medication with food.  If no medication is prescribed, you may take non-aspirin pain medication if needed.  PAIN MEDICATION CAN BE VERY CONSTIPATING.  Take a stool softener or laxative such as senokot, pericolace, or milk of magnesia if needed.    Prescription given for *NORCO, ZOFRAN, AND AMOXIL**.  Last pain medication given at *10:30AM**.    If your physician has prescribed pain medication that includes Acetaminophen (Tylenol), do not take additional Acetaminophen (Tylenol) while taking the prescribed medication.    Depression / Suicide Risk    As you are discharged from this [facility] facility, it is important to learn how to keep safe from harming yourself.    Recognize the warning signs:  · Abrupt changes in personality, positive or negative- including increase in energy   · Giving away possessions  · Change in eating patterns- significant weight changes-   positive or negative  · Change in sleeping patterns- unable to sleep or sleeping all the time   · Unwillingness or inability to communicate  · Depression  · Unusual sadness, discouragement and loneliness  · Talk of wanting to die  · Neglect of personal appearance   · Rebelliousness- reckless behavior  · Withdrawal from people/activities they love  · Confusion- inability to concentrate     If you or a loved one observes any of these behaviors or has concerns about self-harm, here's what you can do:  · Talk about it- your feelings and reasons for harming yourself  · Remove any means that you might use to hurt yourself (examples: pills, rope, extension cords, firearm)  · Get professional help from the community (Mental Health, Substance Abuse, psychological counseling)  · Do not be alone:Call your Safe Contact- someone whom you trust who will be there for you.  · Call your local CRISIS HOTLINE 274-0503 or 900-495-7595  · Call your local Children's Mobile Crisis Response Team [place] (819) 661-1512 or www.[place]  · Call the toll free National Suicide Prevention Hotlines   · National Suicide Prevention Lifeline 039-100-QTEG (3282)  · National Hope Line Network 800-SUICIDE (918-7165)       Medication List      START taking these medications        Instructions    amoxicillin 250 MG/5ML Susr   Commonly known as:  AMOXIL    Take 10 mL by mouth 2 times a day for 5 days.   Dose:  500 mg       hydrocodone-acetaminophen 5-325 MG Tabs per tablet   Commonly known as:  NORCO    Take 1-2 Tabs by mouth every four hours as needed.   Dose:  1-2 Tab       ondansetron 4 MG Tbdp   Commonly known as:  ZOFRAN ODT    Take 1 Tab by mouth every 8 hours as needed for Nausea/Vomiting.   Dose:  4 mg         CONTINUE taking these medications        Instructions    acetaminophen 500 MG Tabs   Commonly known as:  TYLENOL    Take 1,000 mg by mouth every 6 hours as needed.   Dose:  1000 mg       albuterol 108 (90 BASE) MCG/ACT Aers  inhalation aerosol    Inhale 2 Puffs by mouth every 6 hours as needed for Shortness of Breath.   Dose:  2 Puff       FILIPPO-SELTZER PLUS COLD & FLU PO    Take  by mouth 3 times a day.       ibuprofen 200 MG Tabs   Commonly known as:  MOTRIN    Take  by mouth as needed for Mild Pain or Headache (Taking 1000mg when needed.).       LEXAPRO 20 MG tablet   Generic drug:  escitalopram    Take 20 mg by mouth every day.   Dose:  20 mg       multivitamin Tabs    Take 1 Tab by mouth every day.   Dose:  1 Tab               Medication Information     Above and/or attached are the medications your physician expects you to take upon discharge. Review all of your home medications and newly ordered medications with your doctor and/or pharmacist. Follow medication instructions as directed by your doctor and/or pharmacist. Please keep your medication list with you and share with your physician. Update the information when medications are discontinued, doses are changed, or new medications (including over-the-counter products) are added; and carry medication information at all times in the event of emergency situations.        Resources     Quit Smoking / Tobacco Use:    I understand the use of any tobacco products increases my chance of suffering from future heart disease or stroke and could cause other illnesses which may shorten my life. Quitting the use of tobacco products is the single most important thing I can do to improve my health. For further information on smoking / tobacco cessation call a Toll Free Quit Line at 1-124.843.5807 (*National [employer] [place]) or 1-869.783.8298 (American Lung Association) or you can access the web based program at www.lungusa.org.    [place] Tobacco Users Help Line:  (803) 468-7289       Toll Free: 1-320.875.7689  Quit Tobacco Program [facility] (673)885-3346    Crisis Hotline:    [place] Crisis Hotline:  2-556-EUEFCAH or 1-819.157.4230    [place] Crisis Hotline:     4-778-953-6257 or 048-413-2166    Discharge Survey:   Thank you for choosing [facility]. We hope we did everything we could to make your hospital stay a pleasant one. You may be receiving a survey and we would appreciate your time and participation in answering the questions. Your input is very valuable to us in our efforts to improve our service to our patients and their families.            Signatures     My signature on this form indicates that:    1. I acknowledge receipt and understanding of these Home Care Instruction.  2. My questions regarding this information have been answered to my satisfaction.  3. I have formulated a plan with my discharge nurse to obtain my prescribed medications for home.    __________________________________      __________________________________                   Patient Signature                                 Guardian/Responsible Adult Signature      __________________________________                 __________       ________                       Nurse Signature                                               Date                 Time

## 2017-05-23 ENCOUNTER — APPOINTMENT (OUTPATIENT)
Dept: RADIOLOGY | Facility: MEDICAL CENTER | Age: 22
End: 2017-05-23
Attending: EMERGENCY MEDICINE
Payer: COMMERCIAL

## 2017-05-23 ENCOUNTER — HOSPITAL ENCOUNTER (EMERGENCY)
Facility: MEDICAL CENTER | Age: 22
End: 2017-05-23
Attending: EMERGENCY MEDICINE
Payer: COMMERCIAL

## 2017-05-23 VITALS
OXYGEN SATURATION: 97 % | DIASTOLIC BLOOD PRESSURE: 70 MMHG | TEMPERATURE: 98.1 F | HEIGHT: 66 IN | SYSTOLIC BLOOD PRESSURE: 122 MMHG | RESPIRATION RATE: 14 BRPM | BODY MASS INDEX: 34.19 KG/M2 | WEIGHT: 212.74 LBS | HEART RATE: 70 BPM

## 2017-05-23 DIAGNOSIS — G89.29 OTHER CHRONIC PAIN: ICD-10-CM

## 2017-05-23 DIAGNOSIS — R10.84 GENERALIZED ABDOMINAL PAIN: ICD-10-CM

## 2017-05-23 LAB
APPEARANCE UR: CLEAR
COLOR UR: YELLOW
GLUCOSE UR STRIP.AUTO-MCNC: NEGATIVE MG/DL
HCG UR QL: NEGATIVE
KETONES UR STRIP.AUTO-MCNC: NEGATIVE MG/DL
LEUKOCYTE ESTERASE UR QL STRIP.AUTO: NEGATIVE
NITRITE UR QL STRIP.AUTO: NEGATIVE
PH UR STRIP.AUTO: 6 [PH]
PROT UR QL STRIP: NEGATIVE MG/DL
RBC UR QL AUTO: ABNORMAL
SP GR UR STRIP.AUTO: 1.01

## 2017-05-23 PROCEDURE — 81025 URINE PREGNANCY TEST: CPT

## 2017-05-23 PROCEDURE — A9270 NON-COVERED ITEM OR SERVICE: HCPCS | Performed by: EMERGENCY MEDICINE

## 2017-05-23 PROCEDURE — 81002 URINALYSIS NONAUTO W/O SCOPE: CPT

## 2017-05-23 PROCEDURE — 99284 EMERGENCY DEPT VISIT MOD MDM: CPT

## 2017-05-23 PROCEDURE — 36415 COLL VENOUS BLD VENIPUNCTURE: CPT

## 2017-05-23 PROCEDURE — 74176 CT ABD & PELVIS W/O CONTRAST: CPT

## 2017-05-23 PROCEDURE — 700102 HCHG RX REV CODE 250 W/ 637 OVERRIDE(OP): Performed by: EMERGENCY MEDICINE

## 2017-05-23 PROCEDURE — 700111 HCHG RX REV CODE 636 W/ 250 OVERRIDE (IP): Performed by: EMERGENCY MEDICINE

## 2017-05-23 RX ORDER — OXYCODONE HYDROCHLORIDE AND ACETAMINOPHEN 5; 325 MG/1; MG/1
1 TABLET ORAL ONCE
Status: DISCONTINUED | OUTPATIENT
Start: 2017-05-23 | End: 2017-05-23 | Stop reason: HOSPADM

## 2017-05-23 RX ORDER — ONDANSETRON 4 MG/1
4 TABLET, ORALLY DISINTEGRATING ORAL ONCE
Status: COMPLETED | OUTPATIENT
Start: 2017-05-23 | End: 2017-05-23

## 2017-05-23 RX ORDER — IBUPROFEN 600 MG/1
600 TABLET ORAL ONCE
Status: COMPLETED | OUTPATIENT
Start: 2017-05-23 | End: 2017-05-23

## 2017-05-23 RX ORDER — OXYCODONE HYDROCHLORIDE AND ACETAMINOPHEN 5; 325 MG/1; MG/1
1 TABLET ORAL ONCE
Status: COMPLETED | OUTPATIENT
Start: 2017-05-23 | End: 2017-05-23

## 2017-05-23 RX ADMIN — ONDANSETRON 4 MG: 4 TABLET, ORALLY DISINTEGRATING ORAL at 10:43

## 2017-05-23 RX ADMIN — OXYCODONE HYDROCHLORIDE AND ACETAMINOPHEN 1 TABLET: 5; 325 TABLET ORAL at 10:43

## 2017-05-23 RX ADMIN — IBUPROFEN 600 MG: 600 TABLET, FILM COATED ORAL at 10:43

## 2017-05-23 ASSESSMENT — PAIN SCALES - GENERAL: PAINLEVEL_OUTOF10: 8

## 2017-05-23 NOTE — ED NOTES
Hx endometriosos, usually relieved w/ percocet.  This am at 0700 took a Percocet 10mg, but suprapubic pain remains.  Also feels lightheaded and shaky.  Did eat breakfast.  No fever/chills/n/v/d.

## 2017-05-23 NOTE — DISCHARGE INSTRUCTIONS
Abdominal Pain (Nonspecific)  Your exam might not show the exact reason you have abdominal pain. Since there are many different causes of abdominal pain, another checkup and more tests may be needed. It is very important to follow up for lasting (persistent) or worsening symptoms. A possible cause of abdominal pain in any person who still has his or her appendix is acute appendicitis. Appendicitis is often hard to diagnose. Normal blood tests, urine tests, ultrasound, and CT scans do not completely rule out early appendicitis or other causes of abdominal pain. Sometimes, only the changes that happen over time will allow appendicitis and other causes of abdominal pain to be determined. Other potential problems that may require surgery may also take time to become more apparent. Because of this, it is important that you follow all of the instructions below.  HOME CARE INSTRUCTIONS   · Rest as much as possible.   · Do not eat solid food until your pain is gone.   · While adults or children have pain: A diet of water, weak decaffeinated tea, broth or bouillon, gelatin, oral rehydration solutions (ORS), frozen ice pops, or ice chips may be helpful.   · When pain is gone in adults or children: Start a light diet (dry toast, crackers, applesauce, or white rice). Increase the diet slowly as long as it does not bother you. Eat no dairy products (including cheese and eggs) and no spicy, fatty, fried, or high-fiber foods.   · Use no alcohol, caffeine, or cigarettes.   · Take your regular medicines unless your caregiver told you not to.   · Take any prescribed medicine as directed.   · Only take over-the-counter or prescription medicines for pain, discomfort, or fever as directed by your caregiver. Do not give aspirin to children.   If your caregiver has given you a follow-up appointment, it is very important to keep that appointment. Not keeping the appointment could result in a permanent injury and/or lasting (chronic) pain  and/or disability. If there is any problem keeping the appointment, you must call to reschedule.   SEEK IMMEDIATE MEDICAL CARE IF:   · Your pain is not gone in 24 hours.   · Your pain becomes worse, changes location, or feels different.   · You or your child has an oral temperature above 102° F (38.9° C), not controlled by medicine.   · Your baby is older than 3 months with a rectal temperature of 102° F (38.9° C) or higher.   · Your baby is 3 months old or younger with a rectal temperature of 100.4° F (38° C) or higher.   · You have shaking chills.   · You keep throwing up (vomiting) or cannot drink liquids.   · There is blood in your vomit or you see blood in your bowel movements.   · Your bowel movements become dark or black.   · You have frequent bowel movements.   · Your bowel movements stop (become blocked) or you cannot pass gas.   · You have bloody, frequent, or painful urination.   · You have yellow discoloration in the skin or whites of the eyes.   · Your stomach becomes bloated or bigger.   · You have dizziness or fainting.   · You have chest or back pain.   MAKE SURE YOU:   · Understand these instructions.   · Will watch your condition.   · Will get help right away if you are not doing well or get worse.   Document Released: 12/18/2006 Document Revised: 03/11/2013 Document Reviewed: 11/15/2010  ExitCare® Patient Information ©2013 [employer].

## 2017-05-23 NOTE — ED AVS SNAPSHOT
5/23/2017    Alexis Osorio  [address]  [place] [address]    Dear Alexis:    [facility] wants to ensure your discharge home is safe and you or your loved ones have had all of your questions answered regarding your care after you leave the hospital.    Below is a list of resources and contact information should you have any questions regarding your hospital stay, follow-up instructions, or active medical symptoms.    Questions or Concerns Regarding… Contact   Medical Questions Related to Your Discharge  (7 days a week, 8am-5pm) Contact a Nurse Care Coordinator   [phone]   Medical Questions Not Related to Your Discharge  (24 hours a day / 7 days a week)  Contact the Nurse Health Line   [phone]    Medications or Discharge Instructions Refer to your discharge packet   or contact your [facility] Primary Care Provider   [phone]   Follow-up Appointment(s) Schedule your appointment via [email or web address]   or contact Scheduling [phone]   Billing Review your statement via [email or web address]  or contact Billing [phone]   Medical Records Review your records via [email or web address]   or contact Medical Records [phone]     You may receive a telephone call within two days of discharge. This call is to make certain you understand your discharge instructions and have the opportunity to have any questions answered. You can also easily access your medical information, test results and upcoming appointments via the [email or web address] free online health management tool. You can learn more and sign up at [email or web address]. For assistance setting up your [email or web address] account, please call [phone].    Once again, we want to ensure your discharge home is safe and that you have a clear understanding of any next steps in your care. If you have any questions or concerns, please do not hesitate to contact us, we are here for you. Thank you for choosing [facility] for your healthcare needs.    Sincerely,    Your [facility] Healthcare Team

## 2017-05-23 NOTE — ED AVS SNAPSHOT
Home Care Instructions                                                                                                                Alexis Osorio   MRN: [id number]    Department:  [facility], Emergency Dept   Date of Visit:  5/23/2017            [facility], Emergency Dept    [address]    [place] [address]    Phone:  [phone]      You were seen by     Slim Sahu M.D.      Your Diagnosis Was     Generalized abdominal pain     R10.84       These are the medications you received during your hospitalization from 05/23/2017 0906 to 05/23/2017 1136     Date/Time Order Dose Route Action    05/23/2017 1043 ibuprofen (MOTRIN) tablet 600 mg 600 mg Oral Given    05/23/2017 1043 ondansetron (ZOFRAN ODT) dispertab 4 mg 4 mg Oral Given    05/23/2017 1043 oxycodone-acetaminophen (PERCOCET) 5-325 MG per tablet 1 Tab 1 Tab Oral Given      Follow-up Information     1. Follow up with [facility], Emergency Dept.    Specialty:  Emergency Medicine    Why:  If symptoms worsen    Contact information    [address] [place] [address] [phone]        2. Follow up with Luis Sofia D.O..    Specialty:  Family Medicine    Why:  As needed    Contact information    [address]   [address]  [facility] [address]  [phone]          3. Follow up with Sweta Yanez M.D..    Specialty:  Gynecology    Why:  call for follow up    Contact information    [address] [address]  [facility] [address]  [phone]        Medication Information     Review all of your home medications and newly ordered medications with your primary doctor and/or pharmacist as soon as possible. Follow medication instructions as directed by your doctor and/or pharmacist.     Please keep your complete medication list with you and share with your physician. Update the information when medications are discontinued, doses are changed, or new medications  (including over-the-counter products) are added; and carry medication information at all times in the event of emergency situations.               Medication List      ASK your doctor about these medications        Instructions    Morning Afternoon Evening Bedtime    acetaminophen 500 MG Tabs   Commonly known as:  TYLENOL        Take 1,000 mg by mouth every 6 hours as needed.   Dose:  1000 mg                        albuterol 108 (90 BASE) MCG/ACT Aers inhalation aerosol        Inhale 2 Puffs by mouth every 6 hours as needed for Shortness of Breath.   Dose:  2 Puff                        FILIPPO-SELTZER PLUS COLD & FLU PO        Take  by mouth 3 times a day.                        hydrocodone-acetaminophen 5-325 MG Tabs per tablet   Commonly known as:  NORCO        Take 1-2 Tabs by mouth every four hours as needed.   Dose:  1-2 Tab                        ibuprofen 200 MG Tabs   Last time this was given:  600 mg on 5/23/2017 10:43 AM   Commonly known as:  MOTRIN        Take  by mouth as needed for Mild Pain or Headache (Taking 1000mg when needed.).                        LEXAPRO 20 MG tablet   Generic drug:  escitalopram        Take 20 mg by mouth every day.   Dose:  20 mg                        multivitamin Tabs        Take 1 Tab by mouth every day.   Dose:  1 Tab                        ondansetron 4 MG Tbdp   Last time this was given:  4 mg on 5/23/2017 10:43 AM   Commonly known as:  ZOFRAN ODT        Take 1 Tab by mouth every 8 hours as needed for Nausea/Vomiting.   Dose:  4 mg                                Procedures and tests performed during your visit     CT-RENAL COLIC EVALUATION(A/P W/O)    POC UA    POC URINE PREGNANCY        Discharge Instructions       Abdominal Pain (Nonspecific)  Your exam might not show the exact reason you have abdominal pain. Since there are many different causes of abdominal pain, another checkup and more tests may be needed. It is very important to follow up for lasting (persistent)  or worsening symptoms. A possible cause of abdominal pain in any person who still has his or her appendix is acute appendicitis. Appendicitis is often hard to diagnose. Normal blood tests, urine tests, ultrasound, and CT scans do not completely rule out early appendicitis or other causes of abdominal pain. Sometimes, only the changes that happen over time will allow appendicitis and other causes of abdominal pain to be determined. Other potential problems that may require surgery may also take time to become more apparent. Because of this, it is important that you follow all of the instructions below.  HOME CARE INSTRUCTIONS   · Rest as much as possible.   · Do not eat solid food until your pain is gone.   · While adults or children have pain: A diet of water, weak decaffeinated tea, broth or bouillon, gelatin, oral rehydration solutions (ORS), frozen ice pops, or ice chips may be helpful.   · When pain is gone in adults or children: Start a light diet (dry toast, crackers, applesauce, or white rice). Increase the diet slowly as long as it does not bother you. Eat no dairy products (including cheese and eggs) and no spicy, fatty, fried, or high-fiber foods.   · Use no alcohol, caffeine, or cigarettes.   · Take your regular medicines unless your caregiver told you not to.   · Take any prescribed medicine as directed.   · Only take over-the-counter or prescription medicines for pain, discomfort, or fever as directed by your caregiver. Do not give aspirin to children.   If your caregiver has given you a follow-up appointment, it is very important to keep that appointment. Not keeping the appointment could result in a permanent injury and/or lasting (chronic) pain and/or disability. If there is any problem keeping the appointment, you must call to reschedule.   SEEK IMMEDIATE MEDICAL CARE IF:   · Your pain is not gone in 24 hours.   · Your pain becomes worse, changes location, or feels different.   · You or your child  has an oral temperature above 102° F (38.9° C), not controlled by medicine.   · Your baby is older than 3 months with a rectal temperature of 102° F (38.9° C) or higher.   · Your baby is 3 months old or younger with a rectal temperature of 100.4° F (38° C) or higher.   · You have shaking chills.   · You keep throwing up (vomiting) or cannot drink liquids.   · There is blood in your vomit or you see blood in your bowel movements.   · Your bowel movements become dark or black.   · You have frequent bowel movements.   · Your bowel movements stop (become blocked) or you cannot pass gas.   · You have bloody, frequent, or painful urination.   · You have yellow discoloration in the skin or whites of the eyes.   · Your stomach becomes bloated or bigger.   · You have dizziness or fainting.   · You have chest or back pain.   MAKE SURE YOU:   · Understand these instructions.   · Will watch your condition.   · Will get help right away if you are not doing well or get worse.   Document Released: 12/18/2006 Document Revised: 03/11/2013 Document Reviewed: 11/15/2010  ExitCare® Patient Information ©2013 [employer], [employer].          Patient Information     Patient Information    Following emergency treatment: all patient requiring follow-up care must return either to a private physician or a clinic if your condition worsens before you are able to obtain further medical attention, please return to the emergency room.     Billing Information    At [facility], we work to make the billing process streamlined for our patients.  Our Representatives are here to answer any questions you may have regarding your hospital bill.  If you have insurance coverage and have supplied your insurance information to us, we will submit a claim to your insurer on your behalf.  Should you have any questions regarding your bill, we can be reached online or by phone as follows:  Online: You are able pay your bills online or live chat with our representatives  about any billing questions you may have. We are here to help Monday - Friday from 8:00am to 7:30pm and 9:00am - 12:00pm on Saturdays.  Please visit [email or web address]  for more information.   Phone:  [phone] or [phone]    Please note that your emergency physician, surgeon, pathologist, radiologist, anesthesiologist, and other specialists are not employed by [facility] and will therefore bill separately for their services.  Please contact them directly for any questions concerning their bills at the numbers below:     Emergency Physician Services:  [phone]  [facility]:  [phone]  [facility]:  [phone]  [facility]:  [phone]    1. Your final bill may vary from the amount quoted upon discharge if all procedures are not complete at that time, or if your doctor has additional procedures of which we are not aware. You will receive an additional bill if you return to the Emergency Department at [facility] for suture removal regardless of the facility of which the sutures were placed.     2. Please arrange for settlement of this account at the emergency registration.    3. All self-pay accounts are due in full at the time of treatment.  If you are unable to meet this obligation then payment is expected within 4-5 days.     4. If you have had radiology studies (CT, X-ray, Ultrasound, MRI), you have received a preliminary result during your emergency department visit. Please contact the radiology department [phone] to receive a copy of your final result. Please discuss the Final result with your primary physician or with the follow up physician provided.     Crisis Hotline:  [place] Crisis Hotline:  3-850-ETNAPDI or 1-253.292.4964  [place] Crisis Hotline:    1-284.690.9266 or 017-989-5410         ED Discharge Follow Up Questions    1. In order to provide you with very good care, we would like to follow up  with a phone call in the next few days.  May we have your permission to contact you?     YES /  NO    2. What is the best phone number to call you? (       )_____-__________    3. What is the best time to call you?      Morning  /  Afternoon  /  Evening                   Patient Signature:  ____________________________________________________________    Date:  ____________________________________________________________

## 2017-05-23 NOTE — ED AVS SNAPSHOT
Movatu Access Code: [email or web address]  Expires: 6/15/2017 12:50 PM    Your email address is not on file at [facility].  Email Addresses are required for you to sign up for Movatu, please contact [phone] to verify your personal information and to provide your email address prior to attempting to register for Movatu.    Alexis Osorio  [address] [place] [address]    Movatu  A secure, online tool to manage your health information     [facility]’s Movatu® is a secure, online tool that connects you to your personalized health information from the privacy of your home -- day or night - making it very easy for you to manage your healthcare. Once the activation process is completed, you can even access your medical information using the Movatu misbah, which is available for free in the Apple Misbah store or Google Play store.     To learn more about Movatu, visit [email or web address]    There are two levels of access available (as shown below):   My Chart Features  [facility] Primary Care Doctor [facility]  Specialists [facility]  Urgent  Care Non-[facility] Primary Care Doctor   Email your healthcare team securely and privately 24/7 X X X    Manage appointments: schedule your next appointment; view details of past/upcoming appointments X      Request prescription refills. X      View recent personal medical records, including lab and immunizations X X X X   View health record, including health history, allergies, medications X X X X   Read reports about your outpatient visits, procedures, consult and ER notes X X X X   See your discharge summary, which is a recap of your hospital and/or ER visit that includes your diagnosis, lab results, and care plan X X  X     How to register for Motopiat:  Once your e-mail address has been verified, follow the following steps to sign up for Movatu.     1. Go to  [email or web address]  2. Click on the Sign Up Now box, which takes you to the New Member Sign Up page.  You will need to provide the following information:  a. Enter your [email or web address] Access Code exactly as it appears at the top of this page. (You will not need to use this code after you’ve completed the sign-up process. If you do not sign up before the expiration date, you must request a new code.)   b. Enter your date of birth.   c. Enter your home email address.   d. Click Submit, and follow the next screen’s instructions.  3. Create a Co3 Systemst ID. This will be your [email or web address] login ID and cannot be changed, so think of one that is secure and easy to remember.  4. Create a [email or web address] password. You can change your password at any time.  5. Enter your Password Reset Question and Answer. This can be used at a later time if you forget your password.   6. Enter your e-mail address. This allows you to receive e-mail notifications when new information is available in [email or web address].  7. Click Sign Up. You can now view your health information.    For assistance activating your [email or web address] account, call [phone]

## 2017-05-23 NOTE — ED PROVIDER NOTES
ED Provider Note    CHIEF COMPLAINT  Chief Complaint   Patient presents with   • Abdominal Pain   • Shaking   • Lightheadedness       HPI  Alexis Osorio is a 21 y.o. female who presents with abdominal pain since the age of 15, 6 years ago, the patient previously has had an appendectomy, the patient was diagnosed with endometriosis, and her gynecologist Dr. Yanez did laparoscopic surgery in February, 3 months ago. After reviewing the chart, at that time the patient was found to have adhesions and endometriosis. The patient since then was doing better but recently over the last 2-3 days has had severe lower abdominal pain and right-sided abdominal pain. She reports it is similar to pain she's had with endometriosis before. She denies any fever, no dysuria, no frequency, no right upper quadrant pain, her pain is not worse with eating, she has no anorexia. She currently describes her pain as severe. She in the past had been on oxycodone but ran out of them. She reports she has not required them recently.    REVIEW OF SYSTEMS  See HPI for further details. All other systems are negative.     PAST MEDICAL HISTORY   has a past medical history of Axillary abscess (9/30/2012); Anxiety; Snoring; Pain; Psychiatric problem; Pneumonia (2015); Anesthesia; Gynecological disorder; Breath shortness; Asthma; Cold; and Cold.    SOCIAL HISTORY  Social History     Social History Main Topics   • Smoking status: Light Tobacco Smoker -- 0.25 packs/day for 6 years     Types: Cigarettes   • Smokeless tobacco: Never Used      Comment: quit 1 week ago   • Alcohol Use: No   • Drug Use: Yes      Comment: marijuana.  Most days for endometriosis.   • Sexual Activity:     Partners: Male     Birth Control/ Protection: Condom, Pill       SURGICAL HISTORY   has past surgical history that includes appendectomy (2008); pelviscopy (N/A, 2/14/2017); and tonsillectomy (Bilateral, 3/31/2017).    CURRENT MEDICATIONS  Home Medications     **Home  "medications have not yet been reviewed for this encounter**          ALLERGIES  No Known Allergies    PHYSICAL EXAM  VITAL SIGNS: /72 mmHg  Pulse 74  Temp(Src) 36.7 °C (98.1 °F)  Resp 14  Ht 1.676 m (5' 6\")  Wt 96.5 kg (212 lb 11.9 oz)  BMI 34.35 kg/m2  SpO2 96%  LMP 05/20/2017 @KRISTIN[[id number]::@   Pulse ox interpretation: I interpret this pulse ox as normal.  Constitutional: Alert in no apparent distress.  HENT: No signs of trauma, Bilateral external ears normal, Nose normal.   Eyes: Pupils are equal and reactive, Conjunctiva normal, Non-icteric.   Neck: Normal range of motion, No tenderness, Supple, No stridor.   Lymphatic: No lymphadenopathy noted.   Cardiovascular: Regular rate and rhythm, no murmurs.   Thorax & Lungs: Normal breath sounds, No respiratory distress, No wheezing, No chest tenderness.   Abdomen: Bowel sounds normal, Soft, No tenderness, No masses, No pulsatile masses. No peritoneal signs.  Skin: Warm, Dry, No erythema, No rash.   Back: No bony tenderness, No CVA tenderness.   Extremities: Intact distal pulses, No edema, No tenderness, No cyanosis.  Musculoskeletal: Good range of motion in all major joints. No tenderness to palpation or major deformities noted.   Neurologic: Alert , Normal motor function, Normal sensory function, No focal deficits noted.   Psychiatric: Affect normal, Judgment normal, Mood normal.       DIAGNOSTIC STUDIES / PROCEDURES        LABS  Labs Reviewed   POC UA - Abnormal; Notable for the following:     POC Blood Trace-intact (*)     All other components within normal limits   POC URINE PREGNANCY   POC URINALYSIS   POC URINE PREGNANCY         RADIOLOGY  CT-RENAL COLIC EVALUATION(A/P W/O)   Final Result      1.  No evidence of renal or ureteral stone or evidence of hydronephrosis. No evidence of inflammatory change.      2.  Likely prior appendectomy.              COURSE & MEDICAL DECISION MAKING  Pertinent Labs & Imaging studies reviewed. (See chart for " details)    I reviewed the patient's chart, on February 2, 2017 the patient had a normal vaginal ultrasound, she has no CT scans in our system.    I spoke with the patient the pros and cons of doing a CT scan and at this time we will do the CT scan.    Differential diagnosis: Adhesions, endometriosis, ectopic pregnancy    The patient was given one Percocet by mouth and 1 Zofran 4 mg by mouth.    The patient was given a 2nd Percocet by mouth on reassessment. Her abdomen remained soft and nontender.    The patient will follow-up with her primary care doctor, as well as her gynecologist. At this time I do not feel comfortable prescribing her narcotic pain medications. Return immediately if worse.    The patient will not drink alcohol nor drive with prescribed medications. The patient will return for worsening symptoms and is stable at the time of discharge. The patient verbalizes understanding and will comply.    FINAL IMPRESSION  1. Acute abdominal pain  2.   3.         Electronically signed by: Slim Sahu, 5/23/2017 10:34 AM

## 2017-06-13 ENCOUNTER — HOSPITAL ENCOUNTER (OUTPATIENT)
Dept: RADIOLOGY | Facility: MEDICAL CENTER | Age: 22
End: 2017-06-13
Attending: OBSTETRICS & GYNECOLOGY
Payer: COMMERCIAL

## 2017-06-13 DIAGNOSIS — R10.2 ADNEXAL TENDERNESS: ICD-10-CM

## 2017-06-13 PROCEDURE — 76830 TRANSVAGINAL US NON-OB: CPT

## 2017-06-20 ENCOUNTER — OFFICE VISIT (OUTPATIENT)
Dept: URGENT CARE | Facility: CLINIC | Age: 22
End: 2017-06-20
Payer: COMMERCIAL

## 2017-06-20 VITALS
WEIGHT: 205 LBS | HEART RATE: 78 BPM | BODY MASS INDEX: 32.95 KG/M2 | DIASTOLIC BLOOD PRESSURE: 68 MMHG | HEIGHT: 66 IN | RESPIRATION RATE: 14 BRPM | SYSTOLIC BLOOD PRESSURE: 108 MMHG | OXYGEN SATURATION: 97 % | TEMPERATURE: 98.6 F

## 2017-06-20 DIAGNOSIS — J01.90 ACUTE BACTERIAL RHINOSINUSITIS: Primary | ICD-10-CM

## 2017-06-20 DIAGNOSIS — J98.8 RTI (RESPIRATORY TRACT INFECTION): ICD-10-CM

## 2017-06-20 DIAGNOSIS — B96.89 ACUTE BACTERIAL RHINOSINUSITIS: Primary | ICD-10-CM

## 2017-06-20 PROCEDURE — 99214 OFFICE O/P EST MOD 30 MIN: CPT | Performed by: FAMILY MEDICINE

## 2017-06-20 RX ORDER — CODEINE PHOSPHATE AND GUAIFENESIN 10; 100 MG/5ML; MG/5ML
10 SOLUTION ORAL EVERY 6 HOURS PRN
Qty: 240 ML | Refills: 0 | Status: SHIPPED | OUTPATIENT
Start: 2017-06-20 | End: 2017-10-31

## 2017-06-20 RX ORDER — PREDNISONE 20 MG/1
40 TABLET ORAL EVERY MORNING
Qty: 12 TAB | Refills: 0 | Status: SHIPPED | OUTPATIENT
Start: 2017-06-20 | End: 2017-06-26

## 2017-06-20 RX ORDER — DEXTROMETHORPHAN HYDROBROMIDE AND PROMETHAZINE HYDROCHLORIDE 15; 6.25 MG/5ML; MG/5ML
5 SYRUP ORAL EVERY 6 HOURS PRN
Qty: 120 ML | Refills: 0 | Status: SHIPPED | OUTPATIENT
Start: 2017-06-20 | End: 2017-10-31

## 2017-06-20 RX ORDER — AMOXICILLIN 875 MG/1
875 TABLET, COATED ORAL EVERY 12 HOURS
Qty: 14 TAB | Refills: 0 | Status: SHIPPED | OUTPATIENT
Start: 2017-06-20 | End: 2017-06-27

## 2017-06-20 ASSESSMENT — ENCOUNTER SYMPTOMS
CHILLS: 0
DIZZINESS: 0
SORE THROAT: 1
DIARRHEA: 1
ORTHOPNEA: 0
FOCAL WEAKNESS: 0
COUGH: 1
FEVER: 0
SPUTUM PRODUCTION: 0

## 2017-06-20 NOTE — PROGRESS NOTES
"Subjective:      Alexis Osorio is a 21 y.o. female who presents with URI    Chief Complaint   Patient presents with   • URI     uri symptoms , some diarrhea x 10 days . recent tonsillectomy . needs work note .        - This is a very pleasant 21 y.o. female with complaints of 1 week w/ cough and lots sinus pain pressure and DC. Not getting better w/ otc cold meds          ALLERGIES:  Review of patient's allergies indicates no known allergies.     PMH:  Past Medical History   Diagnosis Date   • Axillary abscess 9/30/2012   • Anxiety    • Snoring    • Pain      during period   • Psychiatric problem      depression, anxiety   • Pneumonia 2015   • Anesthesia      pt stated her \"Dad woke up during surgery\"   • Gynecological disorder      Endometriosis.   • Breath shortness      Not a current problem.   • Asthma      Inhalers PRN   • Cold      off & on 4 months   • Cold      Cold - started 2 days ago 3/26/17.  Congested, mucus, nosed plugged at night and sinus pressure - denies fever.        MEDS:    Current outpatient prescriptions:   •  Pseudoephedrine HCl (SUDAFED PO), Take  by mouth., Disp: , Rfl:   •  Dextromethorphan-Guaifenesin (MUCINEX DM PO), Take  by mouth., Disp: , Rfl:   •  amoxicillin (AMOXIL) 875 MG tablet, Take 1 Tab by mouth every 12 hours for 7 days., Disp: 14 Tab, Rfl: 0  •  promethazine-dextromethorphan (PROMETHAZINE-DM) 6.25-15 MG/5ML syrup, Take 5 mL by mouth every 6 hours as needed for Cough., Disp: 120 mL, Rfl: 0  •  predniSONE (DELTASONE) 20 MG Tab, Take 2 Tabs by mouth every morning for 6 days., Disp: 12 Tab, Rfl: 0  •  guaifenesin-codeine (ROBITUSSIN AC) Solution oral solution, Take 10 mL by mouth every 6 hours as needed for Cough., Disp: 240 mL, Rfl: 0  •  escitalopram (LEXAPRO) 20 MG tablet, Take 20 mg by mouth every day., Disp: , Rfl:   •  albuterol 108 (90 BASE) MCG/ACT Aero Soln inhalation aerosol, Inhale 2 Puffs by mouth every 6 hours as needed for Shortness of Breath., Disp: 8.5 g, " "Rfl: 1    ** I have documented what I find to be significant in regards to past medical, social, family and surgical history  in my HPI or under PMH/PSH/FH review section, otherwise it is contributory **             HPI    Review of Systems   Constitutional: Positive for malaise/fatigue. Negative for fever and chills.   HENT: Positive for congestion and sore throat.    Respiratory: Positive for cough. Negative for sputum production.    Cardiovascular: Negative for chest pain and orthopnea.   Gastrointestinal: Positive for diarrhea.   Neurological: Negative for dizziness and focal weakness.          Objective:     /68 mmHg  Pulse 78  Temp(Src) 37 °C (98.6 °F)  Resp 14  Ht 1.676 m (5' 5.98\")  Wt 92.987 kg (205 lb)  BMI 33.10 kg/m2  SpO2 97%  LMP 05/20/2017  Breastfeeding? No     Physical Exam   Constitutional: She appears well-developed. No distress.   HENT:   Head: Normocephalic and atraumatic.   Mouth/Throat: Oropharynx is clear and moist.   Eyes: Conjunctivae are normal.   Neck: Neck supple.   Cardiovascular: Regular rhythm.    No murmur heard.  Pulmonary/Chest: Effort normal and breath sounds normal. No respiratory distress.   Neurological: She is alert. She exhibits normal muscle tone.   Skin: Skin is warm and dry.   Psychiatric: She has a normal mood and affect. Judgment normal.   Nursing note and vitals reviewed.              Assessment/Plan:         1. Acute bacterial rhinosinusitis  amoxicillin (AMOXIL) 875 MG tablet   2. RTI (respiratory tract infection)  promethazine-dextromethorphan (PROMETHAZINE-DM) 6.25-15 MG/5ML syrup    predniSONE (DELTASONE) 20 MG Tab    guaifenesin-codeine (ROBITUSSIN AC) Solution oral solution       D/c otc cold meds      Dx & d/c instructions discussed w/ patient and/or family members. Follow up w/ Prvt Dr or here in 3-4 days if not getting better, sooner if needed,  ER if worse and UC/PCP unavailable.        Possible side effects (i.e. Rash, GI upset/constipation, " sedation, elevation of BP or sugars) of any medications given discussed.

## 2017-06-20 NOTE — MR AVS SNAPSHOT
"Yancyrachel Pottsner   2017 12:30 PM   Office Visit   MRN: [id number]    Department:  [facility]   Dept Phone:  [phone]    Description:  Female : 1995   Provider:  Chadd Merrill M.D.           Reason for Visit     URI uri symptoms , some diarrhea x 10 days . recent tonsillectomy . needs work note .      Allergies as of 2017     No Known Allergies      You were diagnosed with     Acute bacterial rhinosinusitis   [[id number]]  -  Primary     RTI (respiratory tract infection)   [[id number]]         Vital Signs     Blood Pressure Pulse Temperature Respirations Height Weight    108/68 mmHg 78 37 °C (98.6 °F) 14 1.676 m (5' 5.98\") 92.987 kg (205 lb)    Body Mass Index Oxygen Saturation Last Menstrual Period Breastfeeding? Smoking Status       33.10 kg/m2 97% 2017 No Light Tobacco Smoker       Basic Information     Date Of Birth Sex Race Ethnicity Preferred Language    1995 Female White Non- English      Problem List              ICD-10-CM Priority Class Noted - Resolved    Axillary abscess L02.419   2012 - Present    Pelvic pain in female R10.2   2017 - Present    Chronic tonsillitis J35.01   3/31/2017 - Present      Health Maintenance        Date Due Completion Dates    IMM HEP B VACCINE (1 of 3 - Primary Series) 1995 ---    IMM HEP A VACCINE (1 of 2 - Standard Series) 10/14/1998 ---    IMM HPV VACCINE (1 of 3 - Female 3 Dose Series) 10/14/2008 ---    IMM VARICELLA (CHICKENPOX) VACCINE (1 of 2 - 2 Dose Adolescent Series) 10/14/2010 ---    IMM MENINGOCOCCAL VACCINE (MCV4) (1 of 1) 10/14/2013 ---    IMM DTaP/Tdap/Td Vaccine (1 - Tdap) 10/14/2016 ---            Current Immunizations     No immunizations on file.      Below and/or attached are the medications your provider expects you to take. Review all of your home medications and newly ordered medications with your provider and/or pharmacist. Follow medication instructions as directed by your provider " and/or pharmacist. Please keep your medication list with you and share with your provider. Update the information when medications are discontinued, doses are changed, or new medications (including over-the-counter products) are added; and carry medication information at all times in the event of emergency situations     Allergies:  No Known Allergies          Medications  Valid as of: June 20, 2017 -  1:03 PM    Generic Name Brand Name Tablet Size Instructions for use    Albuterol Sulfate (Aero Soln) albuterol 108 (90 BASE) MCG/ACT Inhale 2 Puffs by mouth every 6 hours as needed for Shortness of Breath.        Amoxicillin (Tab) AMOXIL 875 MG Take 1 Tab by mouth every 12 hours for 7 days.        Dextromethorphan-Guaifenesin   Take  by mouth.        Escitalopram Oxalate (Tab) LEXAPRO 20 MG Take 20 mg by mouth every day.        Guaifenesin-Codeine (Solution) ROBITUSSIN -10 mg/5mL Take 10 mL by mouth every 6 hours as needed for Cough.        PredniSONE (Tab) DELTASONE 20 MG Take 2 Tabs by mouth every morning for 6 days.        Promethazine-DM (Syrup) PROMETHAZINE-DM 6.25-15 MG/5ML Take 5 mL by mouth every 6 hours as needed for Cough.        Pseudoephedrine HCl   Take  by mouth.        .                 Medicines prescribed today were sent to:     [id number] - [place] - [address] [address]    Phone: [phone] Fax: [phone]    Open 24 Hours?: No      Medication refill instructions:       If your prescription bottle indicates you have medication refills left, it is not necessary to call your provider’s office. Please contact your pharmacy and they will refill your medication.    If your prescription bottle indicates you do not have any refills left, you may request refills at any time through one of the following ways: The online [employer] system (except Urgent Care), by calling your provider’s office, or by asking your pharmacy to contact your provider’s office with a refill  request. Medication refills are processed only during regular business hours and may not be available until the next business day. Your provider may request additional information or to have a follow-up visit with you prior to refilling your medication.   *Please Note: Medication refills are assigned a new Rx number when refilled electronically. Your pharmacy may indicate that no refills were authorized even though a new prescription for the same medication is available at the pharmacy. Please request the medicine by name with the pharmacy before contacting your provider for a refill.           MyChart Access Code: Activation code not generated  Current MyChart Status: Active          Quit Tobacco Information     Do you want to quit using tobacco?    Quitting tobacco decreases risks of cancer, heart and lung disease, increases life expectancy, improves sense of taste and smell, and increases spending money, among other benefits.    If you are thinking about quitting, we can help.  • [facility] Quit Tobacco Program: 126.695.5911  o Program occurs weekly for four weeks and includes pharmacist consultation on products to support quitting smoking or chewing tobacco. A provider referral is needed for pharmacist consultation.  • Tobacco Users Help Hotline: 9-194-QUIT-NOW (695-0286) or https://[place].quitlogix.org/  o Free, confidential telephone and online coaching for [place] residents. Sessions are designed on a schedule that is convenient for you. Eligible clients receive free nicotine replacement therapy.  • Nationally: www.smokefree.gov  o Information and professional assistance to support both immediate and long-term needs as you become, and remain, a non-smoker. Smokefree.gov allows you to choose the help that best fits your needs.

## 2017-06-20 NOTE — Clinical Note
June 20, 2017         Patient: Alexis Osorio   YOB: 1995   Date of Visit: 6/20/2017           To Whom it May Concern:    Alexis Osorio was seen in my clinic on 6/20/2017. She may return to work in 1 day.    If you have any questions or concerns, please don't hesitate to call.        Sincerely,           Chadd Merrill M.D.  Electronically Signed

## 2017-09-24 ENCOUNTER — OFFICE VISIT (OUTPATIENT)
Dept: URGENT CARE | Facility: PHYSICIAN GROUP | Age: 22
End: 2017-09-24
Payer: COMMERCIAL

## 2017-09-24 VITALS
OXYGEN SATURATION: 96 % | RESPIRATION RATE: 16 BRPM | HEART RATE: 70 BPM | HEIGHT: 66 IN | TEMPERATURE: 98 F | DIASTOLIC BLOOD PRESSURE: 72 MMHG | BODY MASS INDEX: 35.84 KG/M2 | SYSTOLIC BLOOD PRESSURE: 110 MMHG | WEIGHT: 223 LBS

## 2017-09-24 DIAGNOSIS — Z20.828 EXPOSURE TO INFLUENZA: ICD-10-CM

## 2017-09-24 DIAGNOSIS — J06.9 ACUTE URI: ICD-10-CM

## 2017-09-24 DIAGNOSIS — R05.9 COUGH: ICD-10-CM

## 2017-09-24 LAB
FLUAV+FLUBV AG SPEC QL IA: NORMAL
INT CON NEG: NEGATIVE
INT CON POS: POSITIVE

## 2017-09-24 PROCEDURE — 87804 INFLUENZA ASSAY W/OPTIC: CPT | Performed by: FAMILY MEDICINE

## 2017-09-24 PROCEDURE — 99214 OFFICE O/P EST MOD 30 MIN: CPT | Performed by: FAMILY MEDICINE

## 2017-09-24 RX ORDER — DOXYCYCLINE HYCLATE 100 MG
100 TABLET ORAL 2 TIMES DAILY
Qty: 20 TAB | Refills: 0 | Status: SHIPPED | OUTPATIENT
Start: 2017-09-24 | End: 2017-10-04

## 2017-09-24 RX ORDER — ARIPIPRAZOLE 5 MG/1
5 TABLET ORAL DAILY
Status: ON HOLD | COMMUNITY
End: 2018-06-17

## 2017-09-24 RX ORDER — PROMETHAZINE HYDROCHLORIDE AND CODEINE PHOSPHATE 6.25; 1 MG/5ML; MG/5ML
5 SYRUP ORAL 4 TIMES DAILY PRN
Qty: 120 ML | Refills: 0 | Status: SHIPPED | OUTPATIENT
Start: 2017-09-24 | End: 2017-10-31

## 2017-09-24 ASSESSMENT — ENCOUNTER SYMPTOMS
EYE REDNESS: 0
SENSORY CHANGE: 0
CHILLS: 0
EYE DISCHARGE: 0
WEIGHT LOSS: 0

## 2017-09-24 NOTE — LETTER
September 24, 2017         Patient: Alexis Osorio   YOB: 1995   Date of Visit: 9/24/2017           To Whom it May Concern:    Alexis Osorio was seen in my clinic on 9/24/2017. Please excuse excuse 9/22 and 9/25/2017.       Sincerely,           Mike Kelly M.D.  Electronically Signed

## 2017-09-24 NOTE — PROGRESS NOTES
"Subjective:      Alexis Osorio is a 21 y.o. female who presents with Cough (cough, congestion x2 days)            1 day HA, fatigue, ST, nasal congestion. Intermittent dry cough. Subjective fever/chills. No SOB/wheeze. +PMH asthma and CAP x2. OTC mucinex, nyquil, and tylenol with some relief except for HA. No neck stiffness.     No other aggravating or alleviating factors.  Possible influenza exposure    Review of Systems   Constitutional: Negative for chills and weight loss.   Eyes: Negative for discharge and redness.   Neurological: Negative for sensory change.     .  Medications, Allergies, and current problem list reviewed today in Epic       Objective:     /72   Pulse 70   Temp 36.7 °C (98 °F)   Resp 16   Ht 1.676 m (5' 6\")   Wt 101.2 kg (223 lb)   SpO2 96%   BMI 35.99 kg/m²      Physical Exam   Constitutional: She appears well-developed and well-nourished. No distress.   HENT:   Nasal congestion  Pharynx red without exudate     Eyes: Conjunctivae are normal.   Neck: Neck supple.   Cardiovascular: Normal rate, regular rhythm and normal heart sounds.    Pulmonary/Chest: Effort normal and breath sounds normal.   Lymphadenopathy:     She has no cervical adenopathy.   Neurological:   Speech is clear. Patient is appropriate and cooperative.     Skin: Skin is warm and dry. No rash noted.               Assessment/Plan:     1. Acute URI     2. Cough  doxycycline (VIBRAMYCIN) 100 MG Tab    promethazine-codeine (PHENERGAN-CODEINE) 6.25-10 MG/5ML Syrup    POCT Influenza A/B   3. Exposure to influenza  POCT Influenza A/B     Influenza negative  Differential diagnosis, natural history, supportive care, and indications for immediate follow-up discussed at length.   Contingent antibiotic prescription given to patient to fill upon meeting criteria of guidelines discussed.     "

## 2017-10-31 ENCOUNTER — HOSPITAL ENCOUNTER (EMERGENCY)
Facility: MEDICAL CENTER | Age: 22
End: 2017-10-31
Attending: EMERGENCY MEDICINE
Payer: COMMERCIAL

## 2017-10-31 ENCOUNTER — APPOINTMENT (OUTPATIENT)
Dept: RADIOLOGY | Facility: MEDICAL CENTER | Age: 22
End: 2017-10-31
Attending: EMERGENCY MEDICINE
Payer: COMMERCIAL

## 2017-10-31 VITALS
WEIGHT: 224.87 LBS | OXYGEN SATURATION: 97 % | TEMPERATURE: 98.7 F | HEART RATE: 66 BPM | HEIGHT: 71 IN | DIASTOLIC BLOOD PRESSURE: 70 MMHG | RESPIRATION RATE: 18 BRPM | BODY MASS INDEX: 31.48 KG/M2 | SYSTOLIC BLOOD PRESSURE: 122 MMHG

## 2017-10-31 DIAGNOSIS — O23.41 URINARY TRACT INFECTION IN MOTHER DURING FIRST TRIMESTER OF PREGNANCY: ICD-10-CM

## 2017-10-31 LAB
ALBUMIN SERPL BCP-MCNC: 3.6 G/DL (ref 3.2–4.9)
ALBUMIN/GLOB SERPL: 1.1 G/DL
ALP SERPL-CCNC: 57 U/L (ref 30–99)
ALT SERPL-CCNC: 12 U/L (ref 2–50)
ANION GAP SERPL CALC-SCNC: 6 MMOL/L (ref 0–11.9)
APPEARANCE UR: CLEAR
AST SERPL-CCNC: 16 U/L (ref 12–45)
B-HCG SERPL-ACNC: ABNORMAL MIU/ML (ref 0–10)
BACTERIA #/AREA URNS HPF: ABNORMAL /HPF
BASOPHILS # BLD AUTO: 0.7 % (ref 0–1.8)
BASOPHILS # BLD: 0.05 K/UL (ref 0–0.12)
BILIRUB SERPL-MCNC: 0.5 MG/DL (ref 0.1–1.5)
BILIRUB UR QL STRIP.AUTO: NEGATIVE
BUN SERPL-MCNC: <5 MG/DL (ref 8–22)
CALCIUM SERPL-MCNC: 8.9 MG/DL (ref 8.4–10.2)
CHLORIDE SERPL-SCNC: 105 MMOL/L (ref 96–112)
CO2 SERPL-SCNC: 23 MMOL/L (ref 20–33)
COLOR UR: YELLOW
CREAT SERPL-MCNC: 0.57 MG/DL (ref 0.5–1.4)
CULTURE IF INDICATED INDCX: YES UA CULTURE
EOSINOPHIL # BLD AUTO: 0.04 K/UL (ref 0–0.51)
EOSINOPHIL NFR BLD: 0.5 % (ref 0–6.9)
EPI CELLS #/AREA URNS HPF: ABNORMAL /HPF
ERYTHROCYTE [DISTWIDTH] IN BLOOD BY AUTOMATED COUNT: 39.8 FL (ref 35.9–50)
GFR SERPL CREATININE-BSD FRML MDRD: >60 ML/MIN/1.73 M 2
GLOBULIN SER CALC-MCNC: 3.4 G/DL (ref 1.9–3.5)
GLUCOSE SERPL-MCNC: 90 MG/DL (ref 65–99)
GLUCOSE UR STRIP.AUTO-MCNC: NEGATIVE MG/DL
HCT VFR BLD AUTO: 39.1 % (ref 37–47)
HGB BLD-MCNC: 14 G/DL (ref 12–16)
IMM GRANULOCYTES # BLD AUTO: 0.03 K/UL (ref 0–0.11)
IMM GRANULOCYTES NFR BLD AUTO: 0.4 % (ref 0–0.9)
KETONES UR STRIP.AUTO-MCNC: NEGATIVE MG/DL
LEUKOCYTE ESTERASE UR QL STRIP.AUTO: NEGATIVE
LIPASE SERPL-CCNC: 17 U/L (ref 7–58)
LYMPHOCYTES # BLD AUTO: 2.47 K/UL (ref 1–4.8)
LYMPHOCYTES NFR BLD: 33.3 % (ref 22–41)
MCH RBC QN AUTO: 32.8 PG (ref 27–33)
MCHC RBC AUTO-ENTMCNC: 35.8 G/DL (ref 33.6–35)
MCV RBC AUTO: 91.6 FL (ref 81.4–97.8)
MICRO URNS: ABNORMAL
MONOCYTES # BLD AUTO: 0.45 K/UL (ref 0–0.85)
MONOCYTES NFR BLD AUTO: 6.1 % (ref 0–13.4)
NEUTROPHILS # BLD AUTO: 4.37 K/UL (ref 2–7.15)
NEUTROPHILS NFR BLD: 59 % (ref 44–72)
NITRITE UR QL STRIP.AUTO: NEGATIVE
NRBC # BLD AUTO: 0 K/UL
NRBC BLD AUTO-RTO: 0 /100 WBC
PH UR STRIP.AUTO: 6 [PH]
PLATELET # BLD AUTO: 290 K/UL (ref 164–446)
PMV BLD AUTO: 8.7 FL (ref 9–12.9)
POTASSIUM SERPL-SCNC: 3.7 MMOL/L (ref 3.6–5.5)
PROT SERPL-MCNC: 7 G/DL (ref 6–8.2)
PROT UR QL STRIP: NEGATIVE MG/DL
RBC # BLD AUTO: 4.27 M/UL (ref 4.2–5.4)
RBC # URNS HPF: ABNORMAL /HPF
RBC UR QL AUTO: ABNORMAL
SODIUM SERPL-SCNC: 134 MMOL/L (ref 135–145)
SP GR UR STRIP.AUTO: <=1.005
WBC # BLD AUTO: 7.4 K/UL (ref 4.8–10.8)
WBC #/AREA URNS HPF: ABNORMAL /HPF

## 2017-10-31 PROCEDURE — 81001 URINALYSIS AUTO W/SCOPE: CPT

## 2017-10-31 PROCEDURE — 84702 CHORIONIC GONADOTROPIN TEST: CPT

## 2017-10-31 PROCEDURE — 76817 TRANSVAGINAL US OBSTETRIC: CPT

## 2017-10-31 PROCEDURE — 700102 HCHG RX REV CODE 250 W/ 637 OVERRIDE(OP): Performed by: EMERGENCY MEDICINE

## 2017-10-31 PROCEDURE — 83690 ASSAY OF LIPASE: CPT

## 2017-10-31 PROCEDURE — 76830 TRANSVAGINAL US NON-OB: CPT

## 2017-10-31 PROCEDURE — 80053 COMPREHEN METABOLIC PANEL: CPT

## 2017-10-31 PROCEDURE — 87086 URINE CULTURE/COLONY COUNT: CPT

## 2017-10-31 PROCEDURE — 99284 EMERGENCY DEPT VISIT MOD MDM: CPT

## 2017-10-31 PROCEDURE — 85025 COMPLETE CBC W/AUTO DIFF WBC: CPT

## 2017-10-31 PROCEDURE — A9270 NON-COVERED ITEM OR SERVICE: HCPCS | Performed by: EMERGENCY MEDICINE

## 2017-10-31 RX ORDER — ONDANSETRON 4 MG/1
4 TABLET, ORALLY DISINTEGRATING ORAL EVERY 6 HOURS PRN
Status: SHIPPED | COMMUNITY
End: 2017-11-04

## 2017-10-31 RX ORDER — NITROFURANTOIN MACROCRYSTALS 50 MG/1
100 CAPSULE ORAL ONCE
Status: COMPLETED | OUTPATIENT
Start: 2017-10-31 | End: 2017-10-31

## 2017-10-31 RX ORDER — KETOROLAC TROMETHAMINE 30 MG/ML
60 INJECTION, SOLUTION INTRAMUSCULAR; INTRAVENOUS ONCE
Status: DISCONTINUED | OUTPATIENT
Start: 2017-10-31 | End: 2017-10-31

## 2017-10-31 RX ORDER — NITROFURANTOIN 25; 75 MG/1; MG/1
100 CAPSULE ORAL 2 TIMES DAILY
Qty: 14 CAP | Refills: 0 | Status: SHIPPED | OUTPATIENT
Start: 2017-10-31 | End: 2017-11-02

## 2017-10-31 RX ADMIN — NITROFURANTOIN (MACROCRYSTALS) 100 MG: 50 CAPSULE ORAL at 13:24

## 2017-10-31 ASSESSMENT — PAIN SCALES - GENERAL
PAINLEVEL_OUTOF10: 8
PAINLEVEL_OUTOF10: 4
PAINLEVEL_OUTOF10: 8

## 2017-10-31 NOTE — ED NOTES
Urine sample taken to lab. Ultrasound being preformed in room. ERP in to the room to give patient preliminary results from ultrasound: positive pregnancy. Patient and significant other happy about the news. Ultrasound continued.

## 2017-10-31 NOTE — ED NOTES
Patient provided printed discharge instructions which included signs and symptoms to look out for, why to return to ER, and other follow up appointments to make. Patient provided prescription, information on medication, and how to . Patient stated they had no further questions or concerns at this time. Patient discharged to home with significant other. Patient ambulated out of ER with stable gait.

## 2017-10-31 NOTE — DISCHARGE INSTRUCTIONS
Pregnancy and Urinary Tract Infection  A urinary tract infection (UTI) is a bacterial infection of the urinary tract. Infection of the urinary tract can include the ureters, kidneys (pyelonephritis), bladder (cystitis), and urethra (urethritis). All pregnant women should be screened for bacteria in the urinary tract. Identifying and treating a UTI will decrease the risk of  labor and developing more serious infections in both the mother and baby.  CAUSES  Bacteria germs cause almost all UTIs.   RISK FACTORS  Many factors can increase your chances of getting a UTI during pregnancy. These include:  · Having a short urethra.  · Poor toilet and hygiene habits.  · Sexual intercourse.  · Blockage of urine along the urinary tract.  · Problems with the pelvic muscles or nerves.  · Diabetes.  · Obesity.  · Bladder problems after having several children.  · Previous history of UTI.  SIGNS AND SYMPTOMS   · Pain, burning, or a stinging feeling when urinating.  · Suddenly feeling the need to urinate right away (urgency).  · Loss of bladder control (urinary incontinence).  · Frequent urination, more than is common with pregnancy.  · Lower abdominal or back discomfort.  · Cloudy urine.  · Blood in the urine (hematuria).  · Fever.   When the kidneys are infected, the symptoms may be:  · Back pain.  · Flank pain on the right side more so than the left.  · Fever.  · Chills.  · Nausea.  · Vomiting.  DIAGNOSIS   A urinary tract infection is usually diagnosed through urine tests. Additional tests and procedures are sometimes done. These may include:  · Ultrasound exam of the kidneys, ureters, bladder, and urethra.  · Looking in the bladder with a lighted tube (cystoscopy).  TREATMENT  Typically, UTIs can be treated with antibiotic medicines.   HOME CARE INSTRUCTIONS   · Only take over-the-counter or prescription medicines as directed by your health care provider. If you were prescribed antibiotics, take them as directed. Finish  them even if you start to feel better.  · Drink enough fluids to keep your urine clear or pale yellow.  · Do not have sexual intercourse until the infection is gone and your health care provider says it is okay.  · Make sure you are tested for UTIs throughout your pregnancy. These infections often come back.   Preventing a UTI in the Future  · Practice good toilet habits. Always wipe from front to back. Use the tissue only once.  · Do not hold your urine. Empty your bladder as soon as possible when the urge comes.  · Do not douche or use deodorant sprays.  · Wash with soap and warm water around the genital area and the anus.  · Empty your bladder before and after sexual intercourse.  · Wear underwear with a cotton crotch.  · Avoid caffeine and carbonated drinks. They can irritate the bladder.  · Drink cranberry juice or take cranberry pills. This may decrease the risk of getting a UTI.  · Do not drink alcohol.  · Keep all your appointments and tests as scheduled.   SEEK MEDICAL CARE IF:   · Your symptoms get worse.  · You are still having fevers 2 or more days after treatment begins.  · You have a rash.  · You feel that you are having problems with medicines prescribed.  · You have abnormal vaginal discharge.  SEEK IMMEDIATE MEDICAL CARE IF:   · You have back or flank pain.  · You have chills.  · You have blood in your urine.  · You have nausea and vomiting.  · You have contractions of your uterus.  · You have a gush of fluid from the vagina.  MAKE SURE YOU:  · Understand these instructions.    · Will watch your condition.    · Will get help right away if you are not doing well or get worse.       This information is not intended to replace advice given to you by your health care provider. Make sure you discuss any questions you have with your health care provider.     Document Released: 04/13/2012 Document Revised: 10/08/2014 Document Reviewed: 07/17/2014  Elsevier Interactive Patient Education ©2016 Elsevier  Inc.

## 2017-10-31 NOTE — ED NOTES
"Pt amb to triage c/o lower abd pain with nausea for 3 days. Hx of endometriosis. Denies vaginal drainage.    Pulse (!) 53   Temp 37.1 °C (98.7 °F)   Resp 18   Ht 1.803 m (5' 11\")   Wt 102 kg (224 lb 13.9 oz)   SpO2 98%   BMI 31.36 kg/m²     "

## 2017-10-31 NOTE — ED NOTES
Patient complains of N/V and pelvic cramping pain, and pain with bowel movement for past 3 days. Patient has history of endometriosis and complains her home medications for pain and nausea are not working.

## 2017-10-31 NOTE — ED PROVIDER NOTES
"ED Provider Note    CHIEF COMPLAINT  Chief Complaint   Patient presents with   • Abdominal Pain       HPI  Alexis Toscano is a 22 y.o. female who presentsTo the emergency department with lower abdominal pain. She has a history of endometriosis and states that she believes her pain secondary to endometriosis. She has not had any dysuria. No vomiting. No fevers. No flank pain. No constipation. No diarrhea. Last period was 6 weeks ago.    REVIEW OF SYSTEMS  See HPI for further details. All other systems are negative.     PAST MEDICAL HISTORY  Past Medical History:   Diagnosis Date   • Anesthesia     pt stated her \"Dad woke up during surgery\"   • Anxiety    • Asthma     Inhalers PRN   • Axillary abscess 9/30/2012   • Breath shortness     Not a current problem.   • Cold     off & on 4 months   • Cold     Cold - started 2 days ago 3/26/17.  Congested, mucus, nosed plugged at night and sinus pressure - denies fever.   • Gynecological disorder     Endometriosis.   • Pain     during period   • Pneumonia 2015   • Psychiatric problem     depression, anxiety   • Snoring        FAMILY HISTORY  No family history on file.    SOCIAL HISTORY  Social History     Social History   • Marital status: Single     Spouse name: N/A   • Number of children: N/A   • Years of education: N/A     Social History Main Topics   • Smoking status: Light Tobacco Smoker     Packs/day: 0.25     Years: 6.00     Types: Cigarettes   • Smokeless tobacco: Never Used      Comment: quit 1 week ago   • Alcohol use No   • Drug use:       Comment: marijuana.  Most days for endometriosis.   • Sexual activity: Yes     Partners: Male     Birth control/ protection: Condom, Pill     Other Topics Concern   • Not on file     Social History Narrative   • No narrative on file       SURGICAL HISTORY  Past Surgical History:   Procedure Laterality Date   • TONSILLECTOMY Bilateral 3/31/2017    Procedure: TONSILLECTOMY;  Surgeon: Ilya Escalona M.D.;  Location: SURGERY " "SAME DAY [facility] ORS;  Service:    • PELVISCOPY N/A 2/14/2017    Procedure: PELVISCOPY;  Surgeon: Sweta Yanez M.D.;  Location: SURGERY [facility];  Service:    • APPENDECTOMY  2008       CURRENT MEDICATIONS  Home Medications     Reviewed by Kristy Stanley (Pharmacy Tech) on 10/31/17 at 1233  Med List Status: Complete   Medication Last Dose Status   albuterol 108 (90 BASE) MCG/ACT Aero Soln inhalation aerosol 10/31/2017 Active   aripiprazole (ABILIFY) 5 MG tablet 10/31/2017 Active   DiphenhydrAMINE HCl (ALLERGY MED PO) 10/31/2017 Active   escitalopram (LEXAPRO) 20 MG tablet 10/31/2017 Active   ondansetron (ZOFRAN ODT) 4 MG TABLET DISPERSIBLE 10/31/2017 Active   Prenatal Vit-Fe Fumarate-FA (PRENATAL PO) > 3 days Active   Probiotic Product (PROBIOTIC DAILY PO) 10/31/2017 Active                ALLERGIES  No Known Allergies    PHYSICAL EXAM  VITAL SIGNS: /70   Pulse 67   Temp 37.1 °C (98.7 °F)   Resp 18   Ht 1.803 m (5' 11\")   Wt 102 kg (224 lb 13.9 oz)   SpO2 98%   BMI 31.36 kg/m²   Constitutional: Well developed, Well nourished, no distress, Non-toxic appearance.   HENT: Normocephalic, Atraumatic, Bilateral external ears normal, Oropharynx moist, No oral exudates, Nose normal.   Eyes: PERRL, EOMI, Conjunctiva normal, No discharge.   Neck: Normal range of motion, No tenderness, Supple, No stridor.   Lymphatic: No lymphadenopathy noted.   Cardiovascular: Normal heart rate, Normal rhythm, No murmurs, No rubs, No gallops.   Thorax & Lungs: Normal breath sounds, No respiratory distress, No wheezing, No chest tenderness.   Abdomen: Obese, soft, mild suprapubic tenderness, no masses, no cva tenderness   Skin: Warm, Dry, No erythema, No rash.   Back: No tenderness, No CVA tenderness.   Extremities: Intact distal pulses, No edema, No tenderness, No cyanosis, No clubbing.   Neurologic: Alert & oriented x 3, Normal motor function, Normal sensory function, No focal deficits noted. "       RADIOLOGY/PROCEDURES  US-OB PELVIS TRANSVAGINAL   Final Result      Viable single intrauterine gestation measures 6 weeks 4 days and no perigestational hemorrhage is seen        Results for orders placed or performed during the hospital encounter of 10/31/17   CBC WITH DIFFERENTIAL   Result Value Ref Range    WBC 7.4 4.8 - 10.8 K/uL    RBC 4.27 4.20 - 5.40 M/uL    Hemoglobin 14.0 12.0 - 16.0 g/dL    Hematocrit 39.1 37.0 - 47.0 %    MCV 91.6 81.4 - 97.8 fL    MCH 32.8 27.0 - 33.0 pg    MCHC 35.8 (H) 33.6 - 35.0 g/dL    RDW 39.8 35.9 - 50.0 fL    Platelet Count 290 164 - 446 K/uL    MPV 8.7 (L) 9.0 - 12.9 fL    Neutrophils-Polys 59.00 44.00 - 72.00 %    Lymphocytes 33.30 22.00 - 41.00 %    Monocytes 6.10 0.00 - 13.40 %    Eosinophils 0.50 0.00 - 6.90 %    Basophils 0.70 0.00 - 1.80 %    Immature Granulocytes 0.40 0.00 - 0.90 %    Nucleated RBC 0.00 /100 WBC    Neutrophils (Absolute) 4.37 2.00 - 7.15 K/uL    Lymphs (Absolute) 2.47 1.00 - 4.80 K/uL    Monos (Absolute) 0.45 0.00 - 0.85 K/uL    Eos (Absolute) 0.04 0.00 - 0.51 K/uL    Baso (Absolute) 0.05 0.00 - 0.12 K/uL    Immature Granulocytes (abs) 0.03 0.00 - 0.11 K/uL    NRBC (Absolute) 0.00 K/uL   COMP METABOLIC PANEL   Result Value Ref Range    Sodium 134 (L) 135 - 145 mmol/L    Potassium 3.7 3.6 - 5.5 mmol/L    Chloride 105 96 - 112 mmol/L    Co2 23 20 - 33 mmol/L    Anion Gap 6.0 0.0 - 11.9    Glucose 90 65 - 99 mg/dL    Bun <5 (L) 8 - 22 mg/dL    Creatinine 0.57 0.50 - 1.40 mg/dL    Calcium 8.9 8.4 - 10.2 mg/dL    AST(SGOT) 16 12 - 45 U/L    ALT(SGPT) 12 2 - 50 U/L    Alkaline Phosphatase 57 30 - 99 U/L    Total Bilirubin 0.5 0.1 - 1.5 mg/dL    Albumin 3.6 3.2 - 4.9 g/dL    Total Protein 7.0 6.0 - 8.2 g/dL    Globulin 3.4 1.9 - 3.5 g/dL    A-G Ratio 1.1 g/dL   LIPASE   Result Value Ref Range    Lipase 17 7 - 58 U/L   ESTIMATED GFR   Result Value Ref Range    GFR If African American >60 >60 mL/min/1.73 m 2    GFR If Non African American >60 >60 mL/min/1.73  m 2   URINALYSIS,CULTURE IF INDICATED   Result Value Ref Range    Color Yellow     Character Clear     Specific Gravity <=1.005 <1.035    Ph 6.0 5.0 - 8.0    Glucose Negative Negative mg/dL    Ketones Negative Negative mg/dL    Protein Negative Negative mg/dL    Bilirubin Negative Negative    Nitrite Negative Negative    Leukocyte Esterase Negative Negative    Occult Blood Trace (A) Negative    Micro Urine Req Microscopic     Culture Indicated Yes UA Culture   BETA-HCG QUANTITATIVE SERUM   Result Value Ref Range    Bhcg 23674.0 (H) 0.0 - 10.0 mIU/mL   URINE MICROSCOPIC (W/UA)   Result Value Ref Range    WBC 2-5 /hpf    RBC Rare /hpf    Bacteria Many (A) None /hpf    Epithelial Cells Many (A) Few /hpf         COURSE & MEDICAL DECISION MAKING  Pertinent Labs & Imaging studies reviewed. (See chart for details)    Patient presents today with lower abdominal pain. She has a history of endometriosis. She thinks that the pain is likely from her endometriosis. She has a laparoscopy scheduled for a couple weeks from now. Has not had any vomiting. No diarrhea. No vaginal bleeding. Last period was 6 weeks ago. She denies any dysuria. Differential diagnosis includes ovarian cyst, ectopic pregnancy, urinary tract infection, pain related to endometriosis.    IV is established. Laboratory studies are obtained. Urinalysis is remarkable for leukocytes and many bacteria. Therefore the patient will be treated for urinary tract infection. Pregnancy test is positive, beta hCG is 65,000, ultrasound reveals a single live intrauterine gestation at 6 weeks 4 days.    Patient presents today with abdominal pain and pregnancy. I feel she likely has urinary tract infection. She'll be treated with Macrobid. 1st doses provided in the emergency department. She is discharged home in stable condition and to follow up with ObGyn.    FINAL IMPRESSION  1. Urinary tract infection in mother during first trimester of pregnancy            Electronically  signed by: Yrn Jefferson, 10/31/2017 1:11 PM

## 2017-10-31 NOTE — ED NOTES
Patient states she feels better, still has some pain but it has gone down. Discussed with ERP, no IV or medication needed.

## 2017-11-01 ENCOUNTER — PATIENT OUTREACH (OUTPATIENT)
Dept: HEALTH INFORMATION MANAGEMENT | Facility: OTHER | Age: 22
End: 2017-11-01

## 2017-11-02 ENCOUNTER — HOSPITAL ENCOUNTER (EMERGENCY)
Facility: MEDICAL CENTER | Age: 22
End: 2017-11-02
Attending: EMERGENCY MEDICINE
Payer: COMMERCIAL

## 2017-11-02 VITALS
RESPIRATION RATE: 16 BRPM | WEIGHT: 220 LBS | BODY MASS INDEX: 37.56 KG/M2 | SYSTOLIC BLOOD PRESSURE: 114 MMHG | OXYGEN SATURATION: 97 % | HEART RATE: 60 BPM | DIASTOLIC BLOOD PRESSURE: 64 MMHG | TEMPERATURE: 96.8 F | HEIGHT: 64 IN

## 2017-11-02 DIAGNOSIS — O21.9 NAUSEA/VOMITING IN PREGNANCY: ICD-10-CM

## 2017-11-02 LAB
ALBUMIN SERPL BCP-MCNC: 3.5 G/DL (ref 3.2–4.9)
ALBUMIN/GLOB SERPL: 1 G/DL
ALP SERPL-CCNC: 58 U/L (ref 30–99)
ALT SERPL-CCNC: 13 U/L (ref 2–50)
ANION GAP SERPL CALC-SCNC: 7 MMOL/L (ref 0–11.9)
APPEARANCE UR: CLEAR
AST SERPL-CCNC: 18 U/L (ref 12–45)
BACTERIA #/AREA URNS HPF: ABNORMAL /HPF
BACTERIA UR CULT: ABNORMAL
BACTERIA UR CULT: ABNORMAL
BASOPHILS # BLD AUTO: 0.8 % (ref 0–1.8)
BASOPHILS # BLD: 0.05 K/UL (ref 0–0.12)
BILIRUB SERPL-MCNC: 0.7 MG/DL (ref 0.1–1.5)
BILIRUB UR QL STRIP.AUTO: NEGATIVE
BUN SERPL-MCNC: <5 MG/DL (ref 8–22)
CALCIUM SERPL-MCNC: 8.9 MG/DL (ref 8.4–10.2)
CHLORIDE SERPL-SCNC: 106 MMOL/L (ref 96–112)
CO2 SERPL-SCNC: 21 MMOL/L (ref 20–33)
COLOR UR: YELLOW
CREAT SERPL-MCNC: 0.6 MG/DL (ref 0.5–1.4)
CULTURE IF INDICATED INDCX: NO UA CULTURE
EOSINOPHIL # BLD AUTO: 0.03 K/UL (ref 0–0.51)
EOSINOPHIL NFR BLD: 0.5 % (ref 0–6.9)
EPI CELLS #/AREA URNS HPF: ABNORMAL /HPF
ERYTHROCYTE [DISTWIDTH] IN BLOOD BY AUTOMATED COUNT: 39.8 FL (ref 35.9–50)
GFR SERPL CREATININE-BSD FRML MDRD: >60 ML/MIN/1.73 M 2
GLOBULIN SER CALC-MCNC: 3.4 G/DL (ref 1.9–3.5)
GLUCOSE SERPL-MCNC: 92 MG/DL (ref 65–99)
GLUCOSE UR STRIP.AUTO-MCNC: NEGATIVE MG/DL
HCG SERPL QL: POSITIVE
HCT VFR BLD AUTO: 38.7 % (ref 37–47)
HGB BLD-MCNC: 13.9 G/DL (ref 12–16)
IMM GRANULOCYTES # BLD AUTO: 0.01 K/UL (ref 0–0.11)
IMM GRANULOCYTES NFR BLD AUTO: 0.2 % (ref 0–0.9)
KETONES UR STRIP.AUTO-MCNC: ABNORMAL MG/DL
LACTATE BLD-SCNC: 2.15 MMOL/L (ref 0.5–2)
LEUKOCYTE ESTERASE UR QL STRIP.AUTO: NEGATIVE
LYMPHOCYTES # BLD AUTO: 1.86 K/UL (ref 1–4.8)
LYMPHOCYTES NFR BLD: 30.5 % (ref 22–41)
MCH RBC QN AUTO: 32.9 PG (ref 27–33)
MCHC RBC AUTO-ENTMCNC: 35.9 G/DL (ref 33.6–35)
MCV RBC AUTO: 91.7 FL (ref 81.4–97.8)
MICRO URNS: ABNORMAL
MONOCYTES # BLD AUTO: 0.45 K/UL (ref 0–0.85)
MONOCYTES NFR BLD AUTO: 7.4 % (ref 0–13.4)
MUCOUS THREADS #/AREA URNS HPF: ABNORMAL /HPF
NEUTROPHILS # BLD AUTO: 3.69 K/UL (ref 2–7.15)
NEUTROPHILS NFR BLD: 60.6 % (ref 44–72)
NITRITE UR QL STRIP.AUTO: NEGATIVE
NRBC # BLD AUTO: 0 K/UL
NRBC BLD AUTO-RTO: 0 /100 WBC
PH UR STRIP.AUTO: 8 [PH]
PLATELET # BLD AUTO: 293 K/UL (ref 164–446)
PMV BLD AUTO: 8.7 FL (ref 9–12.9)
POTASSIUM SERPL-SCNC: 3.4 MMOL/L (ref 3.6–5.5)
PROT SERPL-MCNC: 6.9 G/DL (ref 6–8.2)
PROT UR QL STRIP: NEGATIVE MG/DL
RBC # BLD AUTO: 4.22 M/UL (ref 4.2–5.4)
RBC # URNS HPF: ABNORMAL /HPF
RBC UR QL AUTO: ABNORMAL
SIGNIFICANT IND 70042: ABNORMAL
SITE SITE: ABNORMAL
SODIUM SERPL-SCNC: 134 MMOL/L (ref 135–145)
SOURCE SOURCE: ABNORMAL
SP GR UR STRIP.AUTO: 1.01
SPECIMEN DRAWN FROM PATIENT: ABNORMAL
WBC # BLD AUTO: 6.1 K/UL (ref 4.8–10.8)
WBC #/AREA URNS HPF: ABNORMAL /HPF

## 2017-11-02 PROCEDURE — 80053 COMPREHEN METABOLIC PANEL: CPT

## 2017-11-02 PROCEDURE — 700105 HCHG RX REV CODE 258: Performed by: EMERGENCY MEDICINE

## 2017-11-02 PROCEDURE — 85025 COMPLETE CBC W/AUTO DIFF WBC: CPT

## 2017-11-02 PROCEDURE — 36415 COLL VENOUS BLD VENIPUNCTURE: CPT

## 2017-11-02 PROCEDURE — 99284 EMERGENCY DEPT VISIT MOD MDM: CPT

## 2017-11-02 PROCEDURE — 700111 HCHG RX REV CODE 636 W/ 250 OVERRIDE (IP)

## 2017-11-02 PROCEDURE — 83605 ASSAY OF LACTIC ACID: CPT

## 2017-11-02 PROCEDURE — 84703 CHORIONIC GONADOTROPIN ASSAY: CPT

## 2017-11-02 PROCEDURE — 81001 URINALYSIS AUTO W/SCOPE: CPT

## 2017-11-02 PROCEDURE — 96374 THER/PROPH/DIAG INJ IV PUSH: CPT

## 2017-11-02 RX ORDER — METOCLOPRAMIDE 10 MG/1
10 TABLET ORAL 3 TIMES DAILY
Qty: 24 TAB | Refills: 0 | Status: ON HOLD | OUTPATIENT
Start: 2017-11-02 | End: 2018-06-17

## 2017-11-02 RX ORDER — ONDANSETRON 2 MG/ML
4 INJECTION INTRAMUSCULAR; INTRAVENOUS ONCE
Status: COMPLETED | OUTPATIENT
Start: 2017-11-02 | End: 2017-11-02

## 2017-11-02 RX ORDER — SODIUM CHLORIDE 9 MG/ML
1000 INJECTION, SOLUTION INTRAVENOUS ONCE
Status: COMPLETED | OUTPATIENT
Start: 2017-11-02 | End: 2017-11-02

## 2017-11-02 RX ORDER — ONDANSETRON 2 MG/ML
INJECTION INTRAMUSCULAR; INTRAVENOUS
Status: COMPLETED
Start: 2017-11-02 | End: 2017-11-02

## 2017-11-02 RX ORDER — SODIUM CHLORIDE 9 MG/ML
1000 INJECTION, SOLUTION INTRAVENOUS ONCE
Status: CANCELLED | OUTPATIENT
Start: 2017-11-02 | End: 2017-11-02

## 2017-11-02 RX ORDER — NITROFURANTOIN 25; 75 MG/1; MG/1
100 CAPSULE ORAL 2 TIMES DAILY
Status: ON HOLD | COMMUNITY
Start: 2017-10-31 | End: 2018-06-17

## 2017-11-02 RX ADMIN — ONDANSETRON 4 MG: 2 INJECTION, SOLUTION INTRAMUSCULAR; INTRAVENOUS at 08:56

## 2017-11-02 RX ADMIN — SODIUM CHLORIDE 1000 ML: 9 INJECTION, SOLUTION INTRAVENOUS at 08:56

## 2017-11-02 ASSESSMENT — PAIN SCALES - GENERAL: PAINLEVEL_OUTOF10: 6

## 2017-11-02 NOTE — ED PROVIDER NOTES
ED Provider Note    CHIEF COMPLAINT  Chief Complaint   Patient presents with   • Vomiting     here on tuesday for same  unable to hold anything down       HPI  Alexis Toscano is a 22 y.o. female who presentsWith abdominal pain and nausea. The patient is currently 6-1/2 weeks pregnant by ultrasound done last week and has been having intermittent nausea which is worse in the mornings for at least 3 weeks. She was seen recently diagnosed with urinary tract infection. She was discharged with Macrobid which she states she has been unable to keep down. She denies any increase in pain and describes it as generally suprapubic in nature. She denies any burning with urination or hematuria and does not have any blood in her vomitus. She does not have any vaginal bleeding or discharge and denies any sick contacts. Patient has past surgical history of appendectomy and is scheduled for a exploratory laparoscopy in December for possible endometriosis.    REVIEW OF SYSTEMS  Pertinent positives include mild abdominal pain and waxing and waning nausea. Vomiting every morning. All other systems reviewed and are negative pertinent positives included in history of present illness.    PAST MEDICAL HISTORY   has a past medical history of Anesthesia; Anxiety; Asthma; Axillary abscess (9/30/2012); Breath shortness; Cold; Cold; Gynecological disorder; Pain; Pneumonia (2015); Psychiatric problem; and Snoring.    SOCIAL HISTORY  Social History     Social History Main Topics   • Smoking status: Former Smoker     Packs/day: 0.25     Years: 6.00     Types: Cigarettes     Quit date: 10/30/2017   • Smokeless tobacco: Never Used      Comment: quit 1 week ago   • Alcohol use No   • Drug use: No      Comment: marijuana.  Most days for endometriosis.   • Sexual activity: Yes     Partners: Male     Birth control/ protection: Condom, Pill       SURGICAL HISTORY   has a past surgical history that includes appendectomy (2008); pelviscopy (N/A,  "2/14/2017); and tonsillectomy (Bilateral, 3/31/2017).    CURRENT MEDICATIONS  Home Medications     Reviewed by Kristy Schwartz (Pharmacy Tech) on 11/02/17 at 0900  Med List Status: Complete   Medication Last Dose Status   albuterol 108 (90 BASE) MCG/ACT Aero Soln inhalation aerosol 10/31/2017 Active   aripiprazole (ABILIFY) 5 MG tablet 10/31/2017 Active   DiphenhydrAMINE HCl (ALLERGY MED PO) 10/31/2017 Active   escitalopram (LEXAPRO) 20 MG tablet 10/31/2017 Active   nitrofurantoin monohydr macro (MACROBID) 100 MG Cap 11/1/2017 Active   ondansetron (ZOFRAN ODT) 4 MG TABLET DISPERSIBLE 10/31/2017 Active   Prenatal Vit-Fe Fumarate-FA (PRENATAL PO) >week Active   Probiotic Product (PROBIOTIC DAILY PO) 10/31/2017 Active                ALLERGIES  No Known Allergies    PHYSICAL EXAM  VITAL SIGNS: /64   Pulse 70   Temp 36 °C (96.8 °F)   Resp 16   Ht 1.626 m (5' 4\")   Wt 99.8 kg (220 lb)   LMP 09/15/2017   SpO2 97%   BMI 37.76 kg/m²    Pulse ox interpretation: I interpret this pulse ox as normal.  Constitutional: Alert in no apparent distress.  HENT: Normocephalic atraumatic  Eyes:  Conjunctiva normal, Non-icteric.   Neck: Normal range of motion, No tenderness, Supple, No stridor.   Lymphatic: No cervical lymphadenopathy   Cardiovascular: Regular rate and rhythm, no murmurs.   Thorax & Lungs: Normal breath sounds, No respiratory distress, No wheezing, No chest tenderness.   Abdomen: Bowel sounds normal, Soft, mild diffuse tenderness, No masses, No pulsatile masses. No peritoneal signs.  Skin: Warm, Dry, No erythema, No rash.   Back:  Extremities: Intact distal pulses, No edema, No tenderness, No cyanosis.  Musculoskeletal:No tenderness to palpation or major deformities noted.   Neurologic: Alert, appropriate  Psychiatric: Affect normal, Judgment normal, Mood normal.       DIAGNOSTIC STUDIES / PROCEDURES    EKG  none    LABS  Results for orders placed or performed during the hospital encounter of " 11/02/17   CBC WITH DIFFERENTIAL   Result Value Ref Range    WBC 6.1 4.8 - 10.8 K/uL    RBC 4.22 4.20 - 5.40 M/uL    Hemoglobin 13.9 12.0 - 16.0 g/dL    Hematocrit 38.7 37.0 - 47.0 %    MCV 91.7 81.4 - 97.8 fL    MCH 32.9 27.0 - 33.0 pg    MCHC 35.9 (H) 33.6 - 35.0 g/dL    RDW 39.8 35.9 - 50.0 fL    Platelet Count 293 164 - 446 K/uL    MPV 8.7 (L) 9.0 - 12.9 fL    Neutrophils-Polys 60.60 44.00 - 72.00 %    Lymphocytes 30.50 22.00 - 41.00 %    Monocytes 7.40 0.00 - 13.40 %    Eosinophils 0.50 0.00 - 6.90 %    Basophils 0.80 0.00 - 1.80 %    Immature Granulocytes 0.20 0.00 - 0.90 %    Nucleated RBC 0.00 /100 WBC    Neutrophils (Absolute) 3.69 2.00 - 7.15 K/uL    Lymphs (Absolute) 1.86 1.00 - 4.80 K/uL    Monos (Absolute) 0.45 0.00 - 0.85 K/uL    Eos (Absolute) 0.03 0.00 - 0.51 K/uL    Baso (Absolute) 0.05 0.00 - 0.12 K/uL    Immature Granulocytes (abs) 0.01 0.00 - 0.11 K/uL    NRBC (Absolute) 0.00 K/uL   CMP   Result Value Ref Range    Sodium 134 (L) 135 - 145 mmol/L    Potassium 3.4 (L) 3.6 - 5.5 mmol/L    Chloride 106 96 - 112 mmol/L    Co2 21 20 - 33 mmol/L    Anion Gap 7.0 0.0 - 11.9    Glucose 92 65 - 99 mg/dL    Bun <5 (L) 8 - 22 mg/dL    Creatinine 0.60 0.50 - 1.40 mg/dL    Calcium 8.9 8.4 - 10.2 mg/dL    AST(SGOT) 18 12 - 45 U/L    ALT(SGPT) 13 2 - 50 U/L    Alkaline Phosphatase 58 30 - 99 U/L    Total Bilirubin 0.7 0.1 - 1.5 mg/dL    Albumin 3.5 3.2 - 4.9 g/dL    Total Protein 6.9 6.0 - 8.2 g/dL    Globulin 3.4 1.9 - 3.5 g/dL    A-G Ratio 1.0 g/dL   BETA-HCG QUALITATIVE SERUM   Result Value Ref Range    Beta-Hcg Qualitative Serum Positive (A) Negative   LACTIC ACID   Result Value Ref Range    Lactic Acid 2.15 (H) 0.50 - 2.00 mmol/L    Specimen Venous    URINALYSIS,CULTURE IF INDICATED   Result Value Ref Range    Color Yellow     Character Clear     Specific Gravity 1.015 <1.035    Ph 8.0 5.0 - 8.0    Glucose Negative Negative mg/dL    Ketones Trace (A) Negative mg/dL    Protein Negative Negative mg/dL     Bilirubin Negative Negative    Nitrite Negative Negative    Leukocyte Esterase Negative Negative    Occult Blood Trace (A) Negative    Micro Urine Req Microscopic     Culture Indicated No UA Culture   ESTIMATED GFR   Result Value Ref Range    GFR If African American >60 >60 mL/min/1.73 m 2    GFR If Non African American >60 >60 mL/min/1.73 m 2   URINE MICROSCOPIC (W/UA)   Result Value Ref Range    WBC 0-2 /hpf    RBC 5-10 (A) /hpf    Bacteria Few (A) None /hpf    Epithelial Cells Few Few /hpf    Mucous Threads Moderate /hpf         RADIOLOGY  none      COURSE & MEDICAL DECISION MAKING  Pertinent Labs & Imaging studies reviewed. (See chart for details)  Patient appears to be having recurrent nausea and vomiting of pregnancy which is complicated by presumed endometriosis which also causes daily nausea. The patient does not appear septic or toxic but did have a mildly low potassium level. I did not feel this needed inpatient repletion and he did not feel further imaging was necessary given the patient's reassuring reevaluation. She was able to tolerate by mouth solids and liquids and was entirely pleasant and cheerful on reevaluation. She stated that her nausea was completely gone. As Zofran is somewhat contraindicated in 1st trimester pregnancy patient will be discharged with Reglan and asked to follow up with her primary care physician and/or her ObGyn. She will continue her nitrofurantoin as prescribed although it is noted that urinalysis was underwhelming today. This may be that she had absorbed at least some of her antibiotic and I felt continuation was reasonable given her previous presentation. He stated clearly understanding return instructions  The patient will not drink alcohol nor drive with prescribed medications. The patient will return for worsening symptoms and is stable at the time of discharge. The patient verbalizes understanding and will comply.    FINAL IMPRESSION  1 Nausea and vomiting in  pregnancy  2.   3.         Electronically signed by: Jose A Pace, 11/2/2017 9:13 AM

## 2017-11-02 NOTE — DISCHARGE INSTRUCTIONS
Morning Sickness  Morning sickness is when you feel sick to your stomach (nauseous) during pregnancy. This nauseous feeling may or may not come with vomiting. It often occurs in the morning but can be a problem any time of day. Morning sickness is most common during the first trimester, but it may continue throughout pregnancy. While morning sickness is unpleasant, it is usually harmless unless you develop severe and continual vomiting (hyperemesis gravidarum). This condition requires more intense treatment.   CAUSES   The cause of morning sickness is not completely known but seems to be related to normal hormonal changes that occur in pregnancy.  RISK FACTORS  You are at greater risk if you:  · Experienced nausea or vomiting before your pregnancy.  · Had morning sickness during a previous pregnancy.  · Are pregnant with more than one baby, such as twins.  TREATMENT   Do not use any medicines (prescription, over-the-counter, or herbal) for morning sickness without first talking to your health care provider. Your health care provider may prescribe or recommend:  · Vitamin B6 supplements.  · Anti-nausea medicines.  · The herbal medicine ajith.  HOME CARE INSTRUCTIONS   · Only take over-the-counter or prescription medicines as directed by your health care provider.  · Taking multivitamins before getting pregnant can prevent or decrease the severity of morning sickness in most women.  · Eat a piece of dry toast or unsalted crackers before getting out of bed in the morning.  · Eat five or six small meals a day.  · Eat dry and bland foods (rice, baked potato). Foods high in carbohydrates are often helpful.  · Do not drink liquids with your meals. Drink liquids between meals.  · Avoid greasy, fatty, and spicy foods.  · Get someone to cook for you if the smell of any food causes nausea and vomiting.  · If you feel nauseous after taking prenatal vitamins, take the vitamins at night or with a snack.   · Snack on protein  foods (nuts, yogurt, cheese) between meals if you are hungry.  · Eat unsweetened gelatins for desserts.  · Wearing an acupressure wristband (worn for sea sickness) may be helpful.  · Acupuncture may be helpful.  · Do not smoke.  · Get a humidifier to keep the air in your house free of odors.  · Get plenty of fresh air.  SEEK MEDICAL CARE IF:   · Your home remedies are not working, and you need medicine.  · You feel dizzy or lightheaded.  · You are losing weight.  SEEK IMMEDIATE MEDICAL CARE IF:   · You have persistent and uncontrolled nausea and vomiting.  · You pass out (faint).  MAKE SURE YOU:  · Understand these instructions.  · Will watch your condition.  · Will get help right away if you are not doing well or get worse.     This information is not intended to replace advice given to you by your health care provider. Make sure you discuss any questions you have with your health care provider.     Document Released: 02/08/2008 Document Revised: 12/23/2014 Document Reviewed: 06/04/2014  [employer]vier Interactive Patient Education ©2016 [employer]

## 2017-11-02 NOTE — ED NOTES
Patient provided printed discharge instructions which included signs and symptoms to look out for, why to return to ER, and other follow up appointments to make. Patient provided prescription, information on medication, and   how to . Patient stated they had no further questions or concerns at this time. Patient discharged to home with . Patient ambulated out of ER with stable gait.

## 2017-11-02 NOTE — ED NOTES
Med rec complete per Pt at bedside  Allergies reviewed  Pt state she has not had any of her regular medication  Sense 10/31 only her ABX  Pt started a 7 day course of Macrobid on 10/31  Pt took one last night but was unable to keep it down  Stating she threw it up

## 2017-11-02 NOTE — ED NOTES
Patient up to bathroom, urine sample taken to lab, patient requested crackers since her nausea has subsided. ERP updated on pt condition, patient provided crackers.

## 2017-11-03 ENCOUNTER — PATIENT OUTREACH (OUTPATIENT)
Dept: HEALTH INFORMATION MANAGEMENT | Facility: OTHER | Age: 22
End: 2017-11-03

## 2017-11-03 NOTE — PROGRESS NOTES
"ED Positive Culture Follow-up/Notification Note:    Date: 11/3/17     Patient seen in the ED on 10/31/2017 for abdominal pain. She was found to be 6 weeks pregnant. \"Urinalysis is remarkable for leukocytes and many bacteria. Therefore the patient will be treated for urinary tract infection.\"    1. Urinary tract infection in mother during first trimester of pregnancy       Discharge Medication List as of 10/31/2017  1:06 PM      START taking these medications    Details   nitrofurantoin monohydr macro (MACROBID) 100 MG Cap Take 1 Cap by mouth 2 times a day for 7 days., Disp-14 Cap, R-0, Print Rx Paper             Allergies: Review of patient's allergies indicates no known allergies.     Final cultures:   Results     Procedure Component Value Units Date/Time    URINE CULTURE(NEW) [[id number]]  (Abnormal) Collected:  10/31/17 1145    Order Status:  Completed Specimen:  Urine Updated:  11/02/17 0910     Significant Indicator POS (POS)     Source UR     Site --     Urine Culture Mixed skin ag 10-50,000 cfu/mL (A)     Urine Culture -- (A)     Lactobacillus species  ,000 cfu/mL      URINALYSIS,CULTURE IF INDICATED [[id number]]  (Abnormal) Collected:  10/31/17 1145    Order Status:  Completed Specimen:  Urine from Urine, Clean Catch Updated:  10/31/17 1244     Color Yellow     Character Clear     Specific Gravity <=1.005     Ph 6.0     Glucose Negative mg/dL      Ketones Negative mg/dL      Protein Negative mg/dL      Bilirubin Negative     Nitrite Negative     Leukocyte Esterase Negative     Occult Blood Trace (A)     Micro Urine Req Microscopic     Culture Indicated Yes UA Culture           Plan:   - Lactobacillus is a component of the mixed skin ag that was identified on urine culture.  Additionally, patient is taking a probiotic which likely contains lactobacillus.  - No change in treatment necessary at this time.    Ngoc Zuniga    "

## 2017-11-04 ENCOUNTER — HOSPITAL ENCOUNTER (EMERGENCY)
Facility: MEDICAL CENTER | Age: 22
End: 2017-11-04
Attending: EMERGENCY MEDICINE
Payer: COMMERCIAL

## 2017-11-04 VITALS
DIASTOLIC BLOOD PRESSURE: 76 MMHG | SYSTOLIC BLOOD PRESSURE: 132 MMHG | WEIGHT: 222.66 LBS | HEIGHT: 64 IN | RESPIRATION RATE: 18 BRPM | BODY MASS INDEX: 38.01 KG/M2 | OXYGEN SATURATION: 99 % | HEART RATE: 70 BPM | TEMPERATURE: 98.4 F

## 2017-11-04 DIAGNOSIS — O21.9 EXCESSIVE VOMITING DURING PREGNANCY: ICD-10-CM

## 2017-11-04 LAB
ALBUMIN SERPL BCP-MCNC: 3.6 G/DL (ref 3.2–4.9)
ALBUMIN/GLOB SERPL: 1.1 G/DL
ALP SERPL-CCNC: 56 U/L (ref 30–99)
ALT SERPL-CCNC: 13 U/L (ref 2–50)
ANION GAP SERPL CALC-SCNC: 7 MMOL/L (ref 0–11.9)
APPEARANCE UR: CLEAR
AST SERPL-CCNC: 15 U/L (ref 12–45)
BACTERIA #/AREA URNS HPF: ABNORMAL /HPF
BASOPHILS # BLD AUTO: 0.3 % (ref 0–1.8)
BASOPHILS # BLD: 0.03 K/UL (ref 0–0.12)
BILIRUB SERPL-MCNC: 0.5 MG/DL (ref 0.1–1.5)
BILIRUB UR QL STRIP.AUTO: NEGATIVE
BUN SERPL-MCNC: <5 MG/DL (ref 8–22)
CALCIUM SERPL-MCNC: 8.9 MG/DL (ref 8.4–10.2)
CHLORIDE SERPL-SCNC: 106 MMOL/L (ref 96–112)
CO2 SERPL-SCNC: 21 MMOL/L (ref 20–33)
COLOR UR: YELLOW
CREAT SERPL-MCNC: 0.56 MG/DL (ref 0.5–1.4)
EOSINOPHIL # BLD AUTO: 0.02 K/UL (ref 0–0.51)
EOSINOPHIL NFR BLD: 0.2 % (ref 0–6.9)
EPI CELLS #/AREA URNS HPF: ABNORMAL /HPF
ERYTHROCYTE [DISTWIDTH] IN BLOOD BY AUTOMATED COUNT: 39.6 FL (ref 35.9–50)
GFR SERPL CREATININE-BSD FRML MDRD: >60 ML/MIN/1.73 M 2
GLOBULIN SER CALC-MCNC: 3.2 G/DL (ref 1.9–3.5)
GLUCOSE SERPL-MCNC: 116 MG/DL (ref 65–99)
GLUCOSE UR STRIP.AUTO-MCNC: NEGATIVE MG/DL
HCT VFR BLD AUTO: 38.3 % (ref 37–47)
HGB BLD-MCNC: 13.4 G/DL (ref 12–16)
IMM GRANULOCYTES # BLD AUTO: 0.03 K/UL (ref 0–0.11)
IMM GRANULOCYTES NFR BLD AUTO: 0.3 % (ref 0–0.9)
KETONES UR STRIP.AUTO-MCNC: NEGATIVE MG/DL
LEUKOCYTE ESTERASE UR QL STRIP.AUTO: NEGATIVE
LIPASE SERPL-CCNC: 21 U/L (ref 7–58)
LYMPHOCYTES # BLD AUTO: 2.19 K/UL (ref 1–4.8)
LYMPHOCYTES NFR BLD: 23.1 % (ref 22–41)
MCH RBC QN AUTO: 31.9 PG (ref 27–33)
MCHC RBC AUTO-ENTMCNC: 35 G/DL (ref 33.6–35)
MCV RBC AUTO: 91.2 FL (ref 81.4–97.8)
MICRO URNS: ABNORMAL
MONOCYTES # BLD AUTO: 0.48 K/UL (ref 0–0.85)
MONOCYTES NFR BLD AUTO: 5.1 % (ref 0–13.4)
MUCOUS THREADS #/AREA URNS HPF: ABNORMAL /HPF
NEUTROPHILS # BLD AUTO: 6.74 K/UL (ref 2–7.15)
NEUTROPHILS NFR BLD: 71 % (ref 44–72)
NITRITE UR QL STRIP.AUTO: NEGATIVE
NRBC # BLD AUTO: 0 K/UL
NRBC BLD AUTO-RTO: 0 /100 WBC
PH UR STRIP.AUTO: 6.5 [PH]
PLATELET # BLD AUTO: 307 K/UL (ref 164–446)
PMV BLD AUTO: 8.9 FL (ref 9–12.9)
POTASSIUM SERPL-SCNC: 3.1 MMOL/L (ref 3.6–5.5)
PROT SERPL-MCNC: 6.8 G/DL (ref 6–8.2)
PROT UR QL STRIP: NEGATIVE MG/DL
RBC # BLD AUTO: 4.2 M/UL (ref 4.2–5.4)
RBC # URNS HPF: ABNORMAL /HPF
RBC UR QL AUTO: ABNORMAL
SODIUM SERPL-SCNC: 134 MMOL/L (ref 135–145)
SP GR UR STRIP.AUTO: 1.02
WBC # BLD AUTO: 9.5 K/UL (ref 4.8–10.8)
WBC #/AREA URNS HPF: ABNORMAL /HPF

## 2017-11-04 PROCEDURE — 96361 HYDRATE IV INFUSION ADD-ON: CPT

## 2017-11-04 PROCEDURE — 87086 URINE CULTURE/COLONY COUNT: CPT

## 2017-11-04 PROCEDURE — 700111 HCHG RX REV CODE 636 W/ 250 OVERRIDE (IP): Performed by: EMERGENCY MEDICINE

## 2017-11-04 PROCEDURE — 94760 N-INVAS EAR/PLS OXIMETRY 1: CPT

## 2017-11-04 PROCEDURE — 96374 THER/PROPH/DIAG INJ IV PUSH: CPT

## 2017-11-04 PROCEDURE — 85025 COMPLETE CBC W/AUTO DIFF WBC: CPT

## 2017-11-04 PROCEDURE — 81001 URINALYSIS AUTO W/SCOPE: CPT

## 2017-11-04 PROCEDURE — 700105 HCHG RX REV CODE 258: Performed by: EMERGENCY MEDICINE

## 2017-11-04 PROCEDURE — 80053 COMPREHEN METABOLIC PANEL: CPT

## 2017-11-04 PROCEDURE — 99284 EMERGENCY DEPT VISIT MOD MDM: CPT

## 2017-11-04 PROCEDURE — 83690 ASSAY OF LIPASE: CPT

## 2017-11-04 RX ORDER — ONDANSETRON 4 MG/1
4-8 TABLET, ORALLY DISINTEGRATING ORAL EVERY 6 HOURS PRN
Qty: 16 TAB | Refills: 0 | Status: ON HOLD | OUTPATIENT
Start: 2017-11-04 | End: 2018-06-17

## 2017-11-04 RX ORDER — SODIUM CHLORIDE 9 MG/ML
2000 INJECTION, SOLUTION INTRAVENOUS ONCE
Status: COMPLETED | OUTPATIENT
Start: 2017-11-04 | End: 2017-11-04

## 2017-11-04 RX ORDER — PROMETHAZINE HYDROCHLORIDE 25 MG/1
25 SUPPOSITORY RECTAL EVERY 6 HOURS PRN
Qty: 15 SUPPOSITORY | Refills: 0 | Status: ON HOLD | OUTPATIENT
Start: 2017-11-04 | End: 2018-06-17

## 2017-11-04 RX ORDER — METOCLOPRAMIDE HYDROCHLORIDE 5 MG/ML
10 INJECTION INTRAMUSCULAR; INTRAVENOUS ONCE
Status: COMPLETED | OUTPATIENT
Start: 2017-11-04 | End: 2017-11-04

## 2017-11-04 RX ADMIN — SODIUM CHLORIDE 2000 ML: 9 INJECTION, SOLUTION INTRAVENOUS at 13:18

## 2017-11-04 RX ADMIN — METOCLOPRAMIDE 10 MG: 5 INJECTION, SOLUTION INTRAMUSCULAR; INTRAVENOUS at 13:18

## 2017-11-04 ASSESSMENT — PAIN SCALES - GENERAL: PAINLEVEL_OUTOF10: 0

## 2017-11-04 NOTE — ED NOTES
The Medication Reconciliation process has been completed by interviewing the patient    Allergies have been reviewed  Antibiotic use in 30 days - nitrofurantoin    Home Pharmacy:  [facility] [place]

## 2017-11-04 NOTE — DISCHARGE INSTRUCTIONS
Hyperemesis Gravidarum  Hyperemesis gravidarum is a severe form of nausea and vomiting that happens during pregnancy. Hyperemesis is worse than morning sickness. It may cause you to have nausea or vomiting all day for many days. It may keep you from eating and drinking enough food and liquids. Hyperemesis usually occurs during the first half (the first 20 weeks) of pregnancy. It often goes away once a woman is in her second half of pregnancy. However, sometimes hyperemesis continues through an entire pregnancy.   CAUSES   The cause of this condition is not completely known but is thought to be related to changes in the body's hormones when pregnant. It could be from the high level of the pregnancy hormone or an increase in estrogen in the body.   SIGNS AND SYMPTOMS   · Severe nausea and vomiting.  · Nausea that does not go away.  · Vomiting that does not allow you to keep any food down.  · Weight loss and body fluid loss (dehydration).  · Having no desire to eat or not liking food you have previously enjoyed.  DIAGNOSIS   Your health care provider will do a physical exam and ask you about your symptoms. He or she may also order blood tests and urine tests to make sure something else is not causing the problem.   TREATMENT   You may only need medicine to control the problem. If medicines do not control the nausea and vomiting, you will be treated in the hospital to prevent dehydration, increased acid in the blood (acidosis), weight loss, and changes in the electrolytes in your body that may harm the unborn baby (fetus). You may need IV fluids.   HOME CARE INSTRUCTIONS   · Only take over-the-counter or prescription medicines as directed by your health care provider.  · Try eating a couple of dry crackers or toast in the morning before getting out of bed.  · Avoid foods and smells that upset your stomach.  · Avoid fatty and spicy foods.  · Eat 5-6 small meals a day.  · Do not drink when eating meals. Drink between  meals.  · For snacks, eat high-protein foods, such as cheese.  · Eat or suck on things that have uzma in them. Uzma helps nausea.  · Avoid food preparation. The smell of food can spoil your appetite.  · Avoid iron pills and iron in your multivitamins until after 3-4 months of being pregnant. However, consult with your health care provider before stopping any prescribed iron pills.  SEEK MEDICAL CARE IF:   · Your abdominal pain increases.  · You have a severe headache.  · You have vision problems.  · You are losing weight.  SEEK IMMEDIATE MEDICAL CARE IF:   · You are unable to keep fluids down.  · You vomit blood.  · You have constant nausea and vomiting.  · You have excessive weakness.  · You have extreme thirst.  · You have dizziness or fainting.  · You have a fever or persistent symptoms for more than 2-3 days.  · You have a fever and your symptoms suddenly get worse.  MAKE SURE YOU:   · Understand these instructions.  · Will watch your condition.  · Will get help right away if you are not doing well or get worse.     This information is not intended to replace advice given to you by your health care provider. Make sure you discuss any questions you have with your health care provider.     Document Released: 12/18/2006 Document Revised: 10/08/2014 Document Reviewed: 07/30/2014  [employer] Interactive Patient Education ©2016 [employer]

## 2017-11-04 NOTE — ED NOTES
This is a very pleasant 22 year-old female seen here this past Friday.  She returns with a history of abdominal pain and nausea. She reports a positive status of 6-1/2 weeks pregnancy determined via a ultrasound completed last week and has been experiencing episodic nausea becoming progressively worse in the mornings for at least the past 3 weeks. Prescribed antiemetics have not been effective in resolving initial symptomatology.  Today she denies any dysuria or hematuria.

## 2017-11-04 NOTE — ED PROVIDER NOTES
"ED Provider Note    CHIEF COMPLAINT  Chief Complaint   Patient presents with   • N/V       HPI  Alexis Toscano is a 22 y.o.  female at nearly 7 weeks of pregnancy who presents with continued nausea vomiting and abdominal pain despite being on Reglan and Macrodantin at home. She states Tylenol is not helping with the pain, and she feels like the pain makes her vomiting worse. It's like a band around the front of her bellybutton. She is bit constipated but passing small hard stools. She denies dysuria, vaginal bleeding, vaginal discharge. This is her 3rd visit this same week for the same symptoms. She states that she always leaves feeling well after being hydrated, then starts getting sick 2 hours after being home. She is not tolerating fluids, crackers. She is losing weight and feels dehydrated. She feels dizzy when she stands up.    REVIEW OF SYSTEMS  See HPI for further details. All other systems are negative.    PAST MEDICAL HISTORY  Past Medical History:   Diagnosis Date   • Pneumonia    • Axillary abscess 2012   • Anesthesia     pt stated her \"Dad woke up during surgery\"   • Anxiety    • Asthma     Inhalers PRN   • Breath shortness     Not a current problem.   • Cold     off & on 4 months   • Cold     Cold - started 2 days ago 3/26/17.  Congested, mucus, nosed plugged at night and sinus pressure - denies fever.   • Gynecological disorder     Endometriosis.   • Pain     during period   • Psychiatric problem     depression, anxiety   • Snoring        FAMILY HISTORY  History reviewed. No pertinent family history.    SOCIAL HISTORY  Social History     Social History   • Marital status:      Spouse name: N/A   • Number of children: N/A   • Years of education: N/A     Social History Main Topics   • Smoking status: Former Smoker     Packs/day: 0.25     Years: 6.00     Types: Cigarettes     Quit date: 10/30/2017   • Smokeless tobacco: Never Used      Comment: quit 1 week ago   • Alcohol use " "No   • Drug use: No      Comment: marijuana.  Most days for endometriosis.   • Sexual activity: Yes     Partners: Male     Birth control/ protection: Condom, Pill     Other Topics Concern   • Not on file     Social History Narrative   • No narrative on file       SURGICAL HISTORY  Past Surgical History:   Procedure Laterality Date   • TONSILLECTOMY Bilateral 3/31/2017    Procedure: TONSILLECTOMY;  Surgeon: Ilya Escalona M.D.;  Location: SURGERY SAME DAY [facility];  Service:    • PELVISCOPY N/A 2/14/2017    Procedure: PELVISCOPY;  Surgeon: Sweta Yanez M.D.;  Location: SURGERY [facility];  Service:    • APPENDECTOMY  2008       CURRENT MEDICATIONS  Home Medications    **Home medications have not yet been reviewed for this encounter**         ALLERGIES  No Known Allergies    PHYSICAL EXAM  VITAL SIGNS: /76   Pulse 70   Temp 36.9 °C (98.4 °F)   Resp 18   Ht 1.626 m (5' 4\") Comment: Stated  Wt 101 kg (222 lb 10.6 oz)   LMP 09/15/2017   SpO2 99%   BMI 38.22 kg/m²  @KRISTIN[[id number]::@   Constitutional: Well developed, Well nourished, No acute respiratory distress, Non-toxic appearance.   HENT: Normocephalic, Atraumatic, Bilateral external ears normal, Oropharynx clear, mucous membranes are dry  Eyes: PERRLA, EOMI, Conjunctiva normal, No discharge. No icterus.  Neck: Normal range of motion. Supple, No stridor.   Lymphatic: No cervical lymphadenopathy noted.   Cardiovascular: Normal heart rate, Normal rhythm, No murmurs, No rubs, No gallops.   Thorax & Lungs: Clear to auscultation bilaterally, No respiratory distress, No wheezing.  Abdomen: Soft, hypoactive bowel sounds, no distention. Mild generalized tenderness to either side of the umbilicus without rebound or guarding  Skin: Warm, Dry, No erythema, No rash.   Extremities: Intact distal pulses, No edema, No tenderness  Musculoskeletal: Good range of motion in all major joints. No tenderness to palpation or major deformities noted. Normal " gait.  Neurologic: Alert & oriented x 3, cranial nerve and cerebellar exams grossly normal. No focal deficits noted.   Psychiatric: Moderately anxious      RADIOLOGY/PROCEDURES  I reviewed the patient's recent ultrasound which showed the following:   Viable single intrauterine gestation measures 6 weeks 4 days and no perigestational hemorrhage is seen   Reading Provider Reading Date   Venu Cruz M.D. Oct 31, 2017         COURSE & MEDICAL DECISION MAKING  Pertinent Labs & Imaging studies reviewed. (See chart for details)  I reviewed the patient's visit here a couple of days ago which showed the following:COURSE & MEDICAL DECISION MAKING  Pertinent Labs & Imaging studies reviewed. (See chart for details)  Patient appears to be having recurrent nausea and vomiting of pregnancy which is complicated by presumed endometriosis which also causes daily nausea. The patient does not appear septic or toxic but did have a mildly low potassium level. I did not feel this needed inpatient repletion and he did not feel further imaging was necessary given the patient's reassuring reevaluation. She was able to tolerate by mouth solids and liquids and was entirely pleasant and cheerful on reevaluation. She stated that her nausea was completely gone. As Zofran is somewhat contraindicated in 1st trimester pregnancy patient will be discharged with Reglan and asked to follow up with her primary care physician and/or her ObGyn. She will continue her nitrofurantoin as prescribed although it is noted that urinalysis was underwhelming today. This may be that she had absorbed at least some of her antibiotic and I felt continuation was reasonable given her previous presentation. He stated clearly understanding return instructions  The patient will not drink alcohol nor drive with prescribed medications. The patient will return for worsening symptoms and is stable at the time of discharge. The patient verbalizes understanding and will  comply.     FINAL IMPRESSION  1 Nausea and vomiting in pregnancy  2.   3.           Electronically signed by: Jose A Pace, 11/2/2017 9:13 AMFINAL IMPRESSION    [facility]'s [facility] with [facility] is in 10 days.    Patient is given IV fluids and Reglan after initial evaluation. We do not have gynecology on service at this facility today.    Results for orders placed or performed during the hospital encounter of 11/04/17   CBC WITH DIFFERENTIAL   Result Value Ref Range    WBC 9.5 4.8 - 10.8 K/uL    RBC 4.20 4.20 - 5.40 M/uL    Hemoglobin 13.4 12.0 - 16.0 g/dL    Hematocrit 38.3 37.0 - 47.0 %    MCV 91.2 81.4 - 97.8 fL    MCH 31.9 27.0 - 33.0 pg    MCHC 35.0 33.6 - 35.0 g/dL    RDW 39.6 35.9 - 50.0 fL    Platelet Count 307 164 - 446 K/uL    MPV 8.9 (L) 9.0 - 12.9 fL    Neutrophils-Polys 71.00 44.00 - 72.00 %    Lymphocytes 23.10 22.00 - 41.00 %    Monocytes 5.10 0.00 - 13.40 %    Eosinophils 0.20 0.00 - 6.90 %    Basophils 0.30 0.00 - 1.80 %    Immature Granulocytes 0.30 0.00 - 0.90 %    Nucleated RBC 0.00 /100 WBC    Neutrophils (Absolute) 6.74 2.00 - 7.15 K/uL    Lymphs (Absolute) 2.19 1.00 - 4.80 K/uL    Monos (Absolute) 0.48 0.00 - 0.85 K/uL    Eos (Absolute) 0.02 0.00 - 0.51 K/uL    Baso (Absolute) 0.03 0.00 - 0.12 K/uL    Immature Granulocytes (abs) 0.03 0.00 - 0.11 K/uL    NRBC (Absolute) 0.00 K/uL   COMP METABOLIC PANEL   Result Value Ref Range    Sodium 134 (L) 135 - 145 mmol/L    Potassium 3.1 (L) 3.6 - 5.5 mmol/L    Chloride 106 96 - 112 mmol/L    Co2 21 20 - 33 mmol/L    Anion Gap 7.0 0.0 - 11.9    Glucose 116 (H) 65 - 99 mg/dL    Bun <5 (L) 8 - 22 mg/dL    Creatinine 0.56 0.50 - 1.40 mg/dL    Calcium 8.9 8.4 - 10.2 mg/dL    AST(SGOT) 15 12 - 45 U/L    ALT(SGPT) 13 2 - 50 U/L    Alkaline Phosphatase 56 30 - 99 U/L    Total Bilirubin 0.5 0.1 - 1.5 mg/dL    Albumin 3.6 3.2 - 4.9 g/dL    Total Protein 6.8 6.0 - 8.2 g/dL    Globulin 3.2 1.9 - 3.5 g/dL    A-G Ratio 1.1 g/dL   LIPASE   Result Value  Ref Range    Lipase 21 7 - 58 U/L   URINALYSIS (UA)   Result Value Ref Range    Color Yellow     Character Clear     Specific Gravity 1.020 <1.035    Ph 6.5 5.0 - 8.0    Glucose Negative Negative mg/dL    Ketones Negative Negative mg/dL    Protein Negative Negative mg/dL    Bilirubin Negative Negative    Nitrite Negative Negative    Leukocyte Esterase Negative Negative    Occult Blood Trace (A) Negative    Micro Urine Req Microscopic    URINE MICROSCOPIC (W/UA)   Result Value Ref Range    WBC 0-2 /hpf    RBC 2-5 (A) /hpf    Bacteria Few (A) None /hpf    Epithelial Cells Moderate (A) Few /hpf    Mucous Threads Moderate /hpf   ESTIMATED GFR   Result Value Ref Range    GFR If African American >60 >60 mL/min/1.73 m 2    GFR If Non African American >60 >60 mL/min/1.73 m 2         2:05 PM  recheck of the patient's bedside reveals that she is tolerating by mouth fluids. She is happy to hear about the results of her studies and is ready to go home. I do not think upsetting her stomach by giving her by mouth potassium is necessary at this time. She understands that if things are not improving, or if things get worse at anytime, she needs to go to [facility] because we do not have gynecology on service at this facility very often.     The patient will return for new or worsening symptoms and is stable at the time of discharge.    The patient is referred to a primary physician for blood pressure management, diabetic screening, and for all other preventative health concerns.    DISPOSITION:  Patient will be discharged home in stable condition.    FOLLOW UP:  [facility], Emergency Dept  [address] [address] [phone]    As needed, If symptoms worsen      OUTPATIENT MEDICATIONS:  New Prescriptions    ONDANSETRON (ZOFRAN ODT) 4 MG TABLET DISPERSIBLE    Take 1-2 Tabs by mouth every 6 hours as needed for Nausea.    PROMETHAZINE (PHENERGAN) 25 MG SUPPOS    Insert 1 Suppository  in rectum every 6 hours as needed for Nausea/Vomiting.         1. Excessive vomiting during pregnancy               Electronically signed by: Desiree Garcia, 11/4/2017 12:31 PM

## 2017-11-05 ENCOUNTER — PATIENT OUTREACH (OUTPATIENT)
Dept: HEALTH INFORMATION MANAGEMENT | Facility: OTHER | Age: 22
End: 2017-11-05

## 2017-11-05 NOTE — PROGRESS NOTES
Placed discharge outreach phone call to patient s/p ER discharge 11/4/17.  Left voicemail providing my contact information and instructions to call with any questions or concerns.

## 2017-11-06 LAB
BACTERIA UR CULT: NORMAL
SIGNIFICANT IND 70042: NORMAL
SITE SITE: NORMAL
SOURCE SOURCE: NORMAL

## 2017-11-14 ENCOUNTER — HOSPITAL ENCOUNTER (OUTPATIENT)
Dept: LAB | Facility: MEDICAL CENTER | Age: 22
End: 2017-11-14
Attending: OBSTETRICS & GYNECOLOGY
Payer: COMMERCIAL

## 2017-11-14 PROCEDURE — 86780 TREPONEMA PALLIDUM: CPT

## 2017-11-14 PROCEDURE — 87340 HEPATITIS B SURFACE AG IA: CPT

## 2017-11-14 PROCEDURE — 85025 COMPLETE CBC W/AUTO DIFF WBC: CPT

## 2017-11-14 PROCEDURE — 87086 URINE CULTURE/COLONY COUNT: CPT

## 2017-11-14 PROCEDURE — 86762 RUBELLA ANTIBODY: CPT

## 2017-11-14 PROCEDURE — 86850 RBC ANTIBODY SCREEN: CPT

## 2017-11-14 PROCEDURE — 86901 BLOOD TYPING SEROLOGIC RH(D): CPT

## 2017-11-14 PROCEDURE — 86900 BLOOD TYPING SEROLOGIC ABO: CPT

## 2017-11-14 PROCEDURE — 87389 HIV-1 AG W/HIV-1&-2 AB AG IA: CPT

## 2017-11-15 ENCOUNTER — HOSPITAL ENCOUNTER (OUTPATIENT)
Facility: MEDICAL CENTER | Age: 22
End: 2017-11-15
Attending: OBSTETRICS & GYNECOLOGY
Payer: COMMERCIAL

## 2017-11-15 LAB
ABO GROUP BLD: NORMAL
BASOPHILS # BLD AUTO: 0.7 % (ref 0–1.8)
BASOPHILS # BLD: 0.05 K/UL (ref 0–0.12)
BLD GP AB SCN SERPL QL: NORMAL
EOSINOPHIL # BLD AUTO: 0.06 K/UL (ref 0–0.51)
EOSINOPHIL NFR BLD: 0.8 % (ref 0–6.9)
ERYTHROCYTE [DISTWIDTH] IN BLOOD BY AUTOMATED COUNT: 41.1 FL (ref 35.9–50)
HBV SURFACE AG SER QL: NEGATIVE
HCT VFR BLD AUTO: 40.2 % (ref 37–47)
HGB BLD-MCNC: 13.8 G/DL (ref 12–16)
HIV 1+2 AB+HIV1 P24 AG SERPL QL IA: NON REACTIVE
IMM GRANULOCYTES # BLD AUTO: 0.02 K/UL (ref 0–0.11)
IMM GRANULOCYTES NFR BLD AUTO: 0.3 % (ref 0–0.9)
LYMPHOCYTES # BLD AUTO: 2.53 K/UL (ref 1–4.8)
LYMPHOCYTES NFR BLD: 34.4 % (ref 22–41)
MCH RBC QN AUTO: 32.5 PG (ref 27–33)
MCHC RBC AUTO-ENTMCNC: 34.3 G/DL (ref 33.6–35)
MCV RBC AUTO: 94.6 FL (ref 81.4–97.8)
MONOCYTES # BLD AUTO: 0.56 K/UL (ref 0–0.85)
MONOCYTES NFR BLD AUTO: 7.6 % (ref 0–13.4)
NEUTROPHILS # BLD AUTO: 4.13 K/UL (ref 2–7.15)
NEUTROPHILS NFR BLD: 56.2 % (ref 44–72)
NRBC # BLD AUTO: 0 K/UL
NRBC BLD AUTO-RTO: 0 /100 WBC
PLATELET # BLD AUTO: 352 K/UL (ref 164–446)
PMV BLD AUTO: 9.4 FL (ref 9–12.9)
RBC # BLD AUTO: 4.25 M/UL (ref 4.2–5.4)
RH BLD: NORMAL
RUBV AB SER QL: 45.9 IU/ML
TREPONEMA PALLIDUM IGG+IGM AB [PRESENCE] IN SERUM OR PLASMA BY IMMUNOASSAY: NON REACTIVE
WBC # BLD AUTO: 7.4 K/UL (ref 4.8–10.8)

## 2017-11-15 PROCEDURE — 87591 N.GONORRHOEAE DNA AMP PROB: CPT

## 2017-11-15 PROCEDURE — 86901 BLOOD TYPING SEROLOGIC RH(D): CPT

## 2017-11-15 PROCEDURE — 86850 RBC ANTIBODY SCREEN: CPT

## 2017-11-15 PROCEDURE — 87491 CHLMYD TRACH DNA AMP PROBE: CPT

## 2017-11-15 PROCEDURE — 86900 BLOOD TYPING SEROLOGIC ABO: CPT

## 2017-11-16 LAB
AMBIGUOUS DTTM AMBI4: NORMAL
SIGNIFICANT IND 70042: NORMAL
SOURCE SOURCE: NORMAL

## 2017-11-17 LAB
BACTERIA UR CULT: NORMAL
C TRACH DNA SPEC QL NAA+PROBE: NEGATIVE
N GONORRHOEA DNA SPEC QL NAA+PROBE: NEGATIVE
SIGNIFICANT IND 70042: NORMAL
SOURCE SOURCE: NORMAL
SPEC CONTAINER SPEC: NORMAL
SPECIMEN SOURCE: NORMAL

## 2018-01-03 ENCOUNTER — OFFICE VISIT (OUTPATIENT)
Dept: URGENT CARE | Facility: CLINIC | Age: 23
End: 2018-01-03
Payer: COMMERCIAL

## 2018-01-03 VITALS
BODY MASS INDEX: 35.85 KG/M2 | WEIGHT: 210 LBS | TEMPERATURE: 98 F | HEIGHT: 64 IN | OXYGEN SATURATION: 96 % | HEART RATE: 75 BPM | DIASTOLIC BLOOD PRESSURE: 74 MMHG | RESPIRATION RATE: 14 BRPM | SYSTOLIC BLOOD PRESSURE: 108 MMHG

## 2018-01-03 DIAGNOSIS — J06.9 UPPER RESPIRATORY TRACT INFECTION, UNSPECIFIED TYPE: ICD-10-CM

## 2018-01-03 DIAGNOSIS — H65.02 ACUTE SEROUS OTITIS MEDIA OF LEFT EAR, RECURRENCE NOT SPECIFIED: ICD-10-CM

## 2018-01-03 LAB
FLUAV+FLUBV AG SPEC QL IA: NEGATIVE
INT CON NEG: NEGATIVE
INT CON POS: POSITIVE

## 2018-01-03 PROCEDURE — 99214 OFFICE O/P EST MOD 30 MIN: CPT | Performed by: PHYSICIAN ASSISTANT

## 2018-01-03 PROCEDURE — 87804 INFLUENZA ASSAY W/OPTIC: CPT | Performed by: PHYSICIAN ASSISTANT

## 2018-01-03 RX ORDER — AMOXICILLIN 875 MG/1
875 TABLET, COATED ORAL 2 TIMES DAILY
Qty: 14 TAB | Refills: 0 | Status: SHIPPED | OUTPATIENT
Start: 2018-01-03 | End: 2018-01-10

## 2018-01-03 ASSESSMENT — ENCOUNTER SYMPTOMS
SHORTNESS OF BREATH: 1
MYALGIAS: 0
SWEATS: 0
ABDOMINAL PAIN: 0
NAUSEA: 0
CHILLS: 0
FEVER: 0
PALPITATIONS: 0
FOCAL WEAKNESS: 0
DIARRHEA: 0
COUGH: 1
WHEEZING: 0
HEADACHES: 1
SINUS PAIN: 1
RHINORRHEA: 0
SENSORY CHANGE: 0
VOMITING: 0
TINGLING: 0
SORE THROAT: 0
SPUTUM PRODUCTION: 1

## 2018-01-03 ASSESSMENT — COPD QUESTIONNAIRES: COPD: 0

## 2018-01-03 NOTE — PROGRESS NOTES
"Subjective:      Alexis Toscano is a 22 y.o. female who presents with Cough (x 3 days and getting worse with runny nose, bilateral ear pain, headache and sore throat)            Cough   This is a new problem. Episode onset: 4 days. The problem has been rapidly worsening. The cough is productive of sputum. Associated symptoms include ear congestion, ear pain (bilateral), headaches, nasal congestion and shortness of breath. Pertinent negatives include no chest pain, chills, fever, myalgias, rash, rhinorrhea, sore throat, sweats or wheezing. She has tried a beta-agonist inhaler (patient is 15 weeks pregnant) for the symptoms. The treatment provided no relief. Her past medical history is significant for asthma. There is no history of bronchitis, COPD or pneumonia.     Past Medical History:   Diagnosis Date   • Anesthesia     pt stated her \"Dad woke up during surgery\"   • Anxiety    • Asthma     Inhalers PRN   • Axillary abscess 9/30/2012   • Breath shortness     Not a current problem.   • Cold     off & on 4 months   • Cold     Cold - started 2 days ago 3/26/17.  Congested, mucus, nosed plugged at night and sinus pressure - denies fever.   • Gynecological disorder     Endometriosis.   • Pain     during period   • Pneumonia 2015   • Psychiatric problem     depression, anxiety   • Snoring      Past Surgical History:   Procedure Laterality Date   • TONSILLECTOMY Bilateral 3/31/2017    Procedure: TONSILLECTOMY;  Surgeon: Ilya Escalona M.D.;  Location: SURGERY SAME DAY [facility];  Service:    • PELVISCOPY N/A 2/14/2017    Procedure: PELVISCOPY;  Surgeon: Sweta Yanez M.D.;  Location: SURGERY [facility];  Service:    • APPENDECTOMY  2008       No family history on file.    No Known Allergies    Medications, Allergies, and current problem list reviewed today in Epic      Review of Systems   Constitutional: Positive for malaise/fatigue. Negative for chills and fever.   HENT: Positive for congestion, ear " "pain (bilateral) and sinus pain. Negative for ear discharge, rhinorrhea and sore throat.    Respiratory: Positive for cough, sputum production and shortness of breath. Negative for wheezing.    Cardiovascular: Negative for chest pain, palpitations and leg swelling.   Gastrointestinal: Negative for abdominal pain, diarrhea, nausea and vomiting.   Musculoskeletal: Negative for myalgias.   Skin: Negative for rash.   Neurological: Positive for headaches. Negative for tingling, sensory change and focal weakness.     All other systems reviewed and are negative.        Objective:     /74   Pulse 75   Temp 36.7 °C (98 °F)   Resp 14   Ht 1.626 m (5' 4\")   Wt 95.3 kg (210 lb)   LMP 09/15/2017   SpO2 96%   BMI 36.05 kg/m²      Physical Exam   Constitutional: She is oriented to person, place, and time. She appears well-developed and well-nourished. No distress.   HENT:   Head: Normocephalic and atraumatic.   Right Ear: External ear and ear canal normal. A middle ear effusion is present.   Left Ear: External ear and ear canal normal. Tympanic membrane is injected. A middle ear effusion is present.   Nose: Mucosal edema and rhinorrhea present. Right sinus exhibits no maxillary sinus tenderness. Left sinus exhibits no maxillary sinus tenderness.   Mouth/Throat: Uvula is midline, oropharynx is clear and moist and mucous membranes are normal.   Cardiovascular: Normal rate, regular rhythm and normal heart sounds.  Exam reveals no gallop and no friction rub.    No murmur heard.  Pulmonary/Chest: Effort normal and breath sounds normal. No respiratory distress. She has no wheezes. She has no rales.   Neurological: She is alert and oriented to person, place, and time. No cranial nerve deficit.   Psychiatric: She has a normal mood and affect. Her behavior is normal. Judgment and thought content normal.               Assessment/Plan:     1. Acute serous otitis media of left ear, recurrence not specified  POCT Influenza A/B    " amoxicillin (AMOXIL) 875 MG tablet   2. Upper respiratory tract infection, unspecified type             Current Outpatient Prescriptions:   •  amoxicillin (AMOXIL) 875 MG tablet, Take 1 Tab by mouth 2 times a day for 7 days., Disp: 14 Tab, Rfl: 0    - encouraged OTC nasal saline rinses, fluids, rest, humidification, Tylenol prn.     Differential diagnoses, Supportive care, and indications for immediate follow-up discussed with patient.   Instructed to return to clinic or nearest emergency department for any change in condition, further concerns, or worsening of symptoms.    The patient demonstrated a good understanding and agreed with the treatment plan.    Padma Ruggiero P.A.-C.

## 2018-01-08 ENCOUNTER — HOSPITAL ENCOUNTER (OUTPATIENT)
Dept: LAB | Facility: MEDICAL CENTER | Age: 23
End: 2018-01-08
Attending: OBSTETRICS & GYNECOLOGY
Payer: COMMERCIAL

## 2018-01-08 PROCEDURE — 81511 FTL CGEN ABNOR FOUR ANAL: CPT

## 2018-01-08 PROCEDURE — 36415 COLL VENOUS BLD VENIPUNCTURE: CPT

## 2018-01-10 LAB
# FETUSES US: NORMAL
AFP MOM SERPL: 1.56
AFP SERPL-MCNC: 42 NG/ML
AGE - REPORTED: 20.6 YR
GA METHOD: NORMAL
GA: 16.43 WEEKS
HCG MOM SERPL: 0.66
HCG SERPL-ACNC: NORMAL IU/L
IDDM PATIENT QL: NO
INHIBIN A MOM SERPL: 1.03
INHIBIN A SERPL-MCNC: 147 PG/ML
INTEGRATED SCN PATIENT-IMP: NORMAL
PATHOLOGY STUDY: NORMAL
U ESTRIOL MOM SERPL: 1.31
U ESTRIOL SERPL-MCNC: 1.14 NG/ML

## 2018-02-17 ENCOUNTER — HOSPITAL ENCOUNTER (OUTPATIENT)
Facility: MEDICAL CENTER | Age: 23
End: 2018-02-17
Attending: OBSTETRICS & GYNECOLOGY | Admitting: OBSTETRICS & GYNECOLOGY
Payer: COMMERCIAL

## 2018-02-17 VITALS
HEART RATE: 99 BPM | DIASTOLIC BLOOD PRESSURE: 59 MMHG | TEMPERATURE: 97.5 F | SYSTOLIC BLOOD PRESSURE: 128 MMHG | RESPIRATION RATE: 18 BRPM

## 2018-02-17 NOTE — PROGRESS NOTES
0950-pt presents from home with cold Sx, states that she has a sore throat, ear ache and sinus pressure, no c/o leaking, bleeding, pain or uc's, states baby is moving normally, FHR dopplered at 140's, TOCO applied, states that she is unsure if she needed to be seen due to pregnancy while ill or not, discussed with pt what OTC meds she is allowed to take while pregnant, verbalized understanding  1010-TC Dr Aguilera, report given, no uc's noted, discharge order received, pt may go to urgent care if she feels she needs to  1015-pt discharged home with OTC meds to get, verbalized understanding, left ambulatory with SO

## 2018-02-27 ENCOUNTER — HOSPITAL ENCOUNTER (OUTPATIENT)
Dept: LAB | Facility: MEDICAL CENTER | Age: 23
End: 2018-02-27
Attending: OBSTETRICS & GYNECOLOGY
Payer: COMMERCIAL

## 2018-02-27 LAB
ERYTHROCYTE [DISTWIDTH] IN BLOOD BY AUTOMATED COUNT: 46.5 FL (ref 35.9–50)
GLUCOSE 1H P 50 G GLC PO SERPL-MCNC: 111 MG/DL (ref 70–139)
HCT VFR BLD AUTO: 39.2 % (ref 37–47)
HGB BLD-MCNC: 12.9 G/DL (ref 12–16)
MCH RBC QN AUTO: 31.8 PG (ref 27–33)
MCHC RBC AUTO-ENTMCNC: 32.9 G/DL (ref 33.6–35)
MCV RBC AUTO: 96.6 FL (ref 81.4–97.8)
PLATELET # BLD AUTO: 285 K/UL (ref 164–446)
PMV BLD AUTO: 9.6 FL (ref 9–12.9)
RBC # BLD AUTO: 4.06 M/UL (ref 4.2–5.4)
TREPONEMA PALLIDUM IGG+IGM AB [PRESENCE] IN SERUM OR PLASMA BY IMMUNOASSAY: NON REACTIVE
WBC # BLD AUTO: 9.8 K/UL (ref 4.8–10.8)

## 2018-02-27 PROCEDURE — 85027 COMPLETE CBC AUTOMATED: CPT

## 2018-02-27 PROCEDURE — 36415 COLL VENOUS BLD VENIPUNCTURE: CPT

## 2018-02-27 PROCEDURE — 82950 GLUCOSE TEST: CPT

## 2018-02-27 PROCEDURE — 86780 TREPONEMA PALLIDUM: CPT

## 2018-03-06 NOTE — MR AVS SNAPSHOT
"Airamgustavo Pottsner   2017 1:20 PM   Office Visit   MRN: [id number]    Department:  [facility]   Dept Phone:  [phone]    Description:  Female : 1995   Provider:  Isidro Lopez PA-C           Reason for Visit     Pharyngitis Sore throat, ear pain x 2 days.      Allergies as of 2017     No Known Allergies      You were diagnosed with     Viral pharyngitis   [543802]  -  Primary       Vital Signs     Blood Pressure Pulse Temperature Respirations Height Weight    110/64 mmHg 65 36.2 °C (97.2 °F) 16 1.676 m (5' 6\") 90.719 kg (200 lb)    Body Mass Index Oxygen Saturation Last Menstrual Period Smoking Status          32.30 kg/m2 99% 2017 Light Tobacco Smoker        Basic Information     Date Of Birth Sex Race Ethnicity Preferred Language    1995 Female White Non- English      Problem List              ICD-10-CM Priority Class Noted - Resolved    Axillary abscess L02.419   2012 - Present      Health Maintenance        Date Due Completion Dates    IMM HEP B VACCINE (1 of 3 - Primary Series) 1995 ---    IMM HEP A VACCINE (1 of 2 - Standard Series) 10/14/1998 ---    IMM HPV VACCINE (1 of 3 - Female 3 Dose Series) 10/14/2008 ---    IMM VARICELLA (CHICKENPOX) VACCINE (1 of 2 - 2 Dose Adolescent Series) 10/14/2010 ---    IMM MENINGOCOCCAL VACCINE (MCV4) (1 of 1) 10/14/2013 ---    IMM INFLUENZA (1) 2016 ---    IMM DTaP/Tdap/Td Vaccine (1 - Tdap) 10/14/2016 ---            Current Immunizations     No immunizations on file.      Below and/or attached are the medications your provider expects you to take. Review all of your home medications and newly ordered medications with your provider and/or pharmacist. Follow medication instructions as directed by your provider and/or pharmacist. Please keep your medication list with you and share with your provider. Update the information when medications are discontinued, doses are changed, or new medications (including " over-the-counter products) are added; and carry medication information at all times in the event of emergency situations     Allergies:  No Known Allergies          Medications  Valid as of: February 13, 2017 -  2:28 PM    Generic Name Brand Name Tablet Size Instructions for use    Albuterol Sulfate (Aero Soln) albuterol 108 (90 BASE) MCG/ACT Inhale 2 Puffs by mouth every 6 hours as needed for Shortness of Breath.        Escitalopram Oxalate (Tab) LEXAPRO 10 MG Take 10 mg by mouth every day.        Fexofenadine HCl   Take  by mouth as needed.        Hydrocod Polst-Chlorphen Polst (Liquid CR) TUSSIONEX 10-8 MG/5ML Take 5 mL by mouth every 12 hours.        Ibuprofen (Tab) MOTRIN 200 MG Take 200 mg by mouth every 6 hours as needed.        TraMADol HCl (Tab) ULTRAM 50 MG Take 50 mg by mouth every four hours as needed.        .                 Medicines prescribed today were sent to:     [facility]Y #[id number] - [place], [place] - [address]    [address] [facility]. [place] [address]    Phone: [phone] Fax: [phone]    Open 24 Hours?: No      Medication refill instructions:       If your prescription bottle indicates you have medication refills left, it is not necessary to call your provider’s office. Please contact your pharmacy and they will refill your medication.    If your prescription bottle indicates you do not have any refills left, you may request refills at any time through one of the following ways: The online KienVe system (except Urgent Care), by calling your provider’s office, or by asking your pharmacy to contact your provider’s office with a refill request. Medication refills are processed only during regular business hours and may not be available until the next business day. Your provider may request additional information or to have a follow-up visit with you prior to refilling your medication.   *Please Note: Medication refills are assigned a new Rx number when refilled electronically. Your pharmacy  "may indicate that no refills were authorized even though a new prescription for the same medication is available at the pharmacy. Please request the medicine by name with the pharmacy before contacting your provider for a refill.        Instructions    Strep Throat  Strep throat is an infection of the throat caused by a bacteria named Streptococcus pyogenes. Your health care provider may call the infection streptococcal \"tonsillitis\" or \"pharyngitis\" depending on whether there are signs of inflammation in the tonsils or back of the throat. Strep throat is most common in children aged 5-15 years during the cold months of the year, but it can occur in people of any age during any season. This infection is spread from person to person (contagious) through coughing, sneezing, or other close contact.  SIGNS AND SYMPTOMS   · Fever or chills.  · Painful, swollen, red tonsils or throat.  · Pain or difficulty when swallowing.  · White or yellow spots on the tonsils or throat.  · Swollen, tender lymph nodes or \"glands\" of the neck or under the jaw.  · Red rash all over the body (rare).  DIAGNOSIS   Many different infections can cause the same symptoms. A test must be done to confirm the diagnosis so the right treatment can be given. A \"rapid strep test\" can help your health care provider make the diagnosis in a few minutes. If this test is not available, a light swab of the infected area can be used for a throat culture test. If a throat culture test is done, results are usually available in a day or two.  TREATMENT   Strep throat is treated with antibiotic medicine.  HOME CARE INSTRUCTIONS   · Gargle with 1 tsp of salt in 1 cup of warm water, 3-4 times per day or as needed for comfort.  · Family members who also have a sore throat or fever should be tested for strep throat and treated with antibiotics if they have the strep infection.  · Make sure everyone in your household washes their hands well.  · Do not share food, " drinking cups, or personal items that could cause the infection to spread to others.  · You may need to eat a soft food diet until your sore throat gets better.  · Drink enough water and fluids to keep your urine clear or pale yellow. This will help prevent dehydration.  · Get plenty of rest.  · Stay home from school, day care, or work until you have been on antibiotics for 24 hours.  · Take medicines only as directed by your health care provider.  · Take your antibiotic medicine as directed by your health care provider. Finish it even if you start to feel better.  SEEK MEDICAL CARE IF:   · The glands in your neck continue to enlarge.  · You develop a rash, cough, or earache.  · You cough up green, yellow-brown, or bloody sputum.  · You have pain or discomfort not controlled by medicines.  · Your problems seem to be getting worse rather than better.  · You have a fever.  SEEK IMMEDIATE MEDICAL CARE IF:   · You develop any new symptoms such as vomiting, severe headache, stiff or painful neck, chest pain, shortness of breath, or trouble swallowing.  · You develop severe throat pain, drooling, or changes in your voice.  · You develop swelling of the neck, or the skin on the neck becomes red and tender.  · You develop signs of dehydration, such as fatigue, dry mouth, and decreased urination.  · You become increasingly sleepy, or you cannot wake up completely.  MAKE SURE YOU:  · Understand these instructions.  · Will watch your condition.  · Will get help right away if you are not doing well or get worse.     This information is not intended to replace advice given to you by your health care provider. Make sure you discuss any questions you have with your health care provider.     Document Released: 12/15/2001 Document Revised: 01/08/2016 Document Reviewed: 04/11/2016  [employer] Interactive Patient Education ©2016 Elsevier Inc.            [employer] Access Code: [email or web address]  Expires: 3/4/2017  8:41 AM    Eddie CUMMINGS  secure, online tool to manage your health information     [employer]’s Viewster® is a secure, online tool that connects you to your personalized health information from the privacy of your home -- day or night - making it very easy for you to manage your healthcare. Once the activation process is completed, you can even access your medical information using the Viewster misbah, which is available for free in the Apple Misbah store or Google Play store.     Viewster provides the following levels of access (as shown below):   My Chart Features   [facility] Doctor [facility]  Specialists [facility]  Urgent  Care [facility]  Primary Care  Doctor   Email your healthcare team securely and privately 24/7 X X X    Manage appointments: schedule your next appointment; view details of past/upcoming appointments X      Request prescription refills. X      View recent personal medical records, including lab and immunizations X X X X   View health record, including health history, allergies, medications X X X X   Read reports about your outpatient visits, procedures, consult and ER notes X X X X   See your discharge summary, which is a recap of your hospital and/or ER visit that includes your diagnosis, lab results, and care plan. X X       How to register for Viewster:  1. Go to  [email or web address].  2. Click on the Sign Up Now box, which takes you to the New Member Sign Up page. You will need to provide the following information:  a. Enter your Viewster Access Code exactly as it appears at the top of this page. (You will not need to use this code after you’ve completed the sign-up process. If you do not sign up before the expiration date, you must request a new code.)   b. Enter your date of birth.   c. Enter your home email address.   d. Click Submit, and follow the next screen’s instructions.  3. Create a Viewster ID. This will be your Viewster login ID and cannot be changed, so think of one that is secure and easy to  remember.  4. Create a Pint Please password. You can change your password at any time.  5. Enter your Password Reset Question and Answer. This can be used at a later time if you forget your password.   6. Enter your e-mail address. This allows you to receive e-mail notifications when new information is available in Pint Please.  7. Click Sign Up. You can now view your health information.    For assistance activating your Pint Please account, call [phone]         yes

## 2018-05-12 ENCOUNTER — APPOINTMENT (OUTPATIENT)
Dept: OTHER | Facility: IMAGING CENTER | Age: 23
End: 2018-05-12

## 2018-05-23 ENCOUNTER — HOSPITAL ENCOUNTER (OUTPATIENT)
Facility: MEDICAL CENTER | Age: 23
End: 2018-05-23
Attending: OBSTETRICS & GYNECOLOGY | Admitting: OBSTETRICS & GYNECOLOGY
Payer: COMMERCIAL

## 2018-05-23 VITALS
DIASTOLIC BLOOD PRESSURE: 68 MMHG | TEMPERATURE: 97.9 F | HEIGHT: 65 IN | HEART RATE: 96 BPM | WEIGHT: 230 LBS | SYSTOLIC BLOOD PRESSURE: 121 MMHG | BODY MASS INDEX: 38.32 KG/M2

## 2018-05-23 PROCEDURE — 59025 FETAL NON-STRESS TEST: CPT | Performed by: OBSTETRICS & GYNECOLOGY
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
## 2018-06-06 ENCOUNTER — HOSPITAL ENCOUNTER (OUTPATIENT)
Dept: LAB | Facility: MEDICAL CENTER | Age: 23
End: 2018-06-06
Attending: OBSTETRICS & GYNECOLOGY
Payer: COMMERCIAL

## 2018-06-06 ENCOUNTER — HOSPITAL ENCOUNTER (OUTPATIENT)
Facility: MEDICAL CENTER | Age: 23
End: 2018-06-06
Attending: OBSTETRICS & GYNECOLOGY
Payer: COMMERCIAL

## 2018-06-06 PROCEDURE — 86694 HERPES SIMPLEX NES ANTBDY: CPT | Mod: 91

## 2018-06-06 PROCEDURE — 86696 HERPES SIMPLEX TYPE 2 TEST: CPT

## 2018-06-06 PROCEDURE — 86695 HERPES SIMPLEX TYPE 1 TEST: CPT | Mod: 91

## 2018-06-06 PROCEDURE — 86694 HERPES SIMPLEX NES ANTBDY: CPT

## 2018-06-08 LAB
HSV1+2 IGG SER IA-ACNC: >22.4 IV
HSV1+2 IGM SER IA-ACNC: 0.54 IV

## 2018-06-15 ENCOUNTER — HOSPITAL ENCOUNTER (INPATIENT)
Facility: MEDICAL CENTER | Age: 23
LOS: 2 days | End: 2018-06-18
Attending: OBSTETRICS & GYNECOLOGY | Admitting: OBSTETRICS & GYNECOLOGY
Payer: COMMERCIAL

## 2018-06-16 LAB
BASOPHILS # BLD AUTO: 0.3 % (ref 0–1.8)
BASOPHILS # BLD: 0.03 K/UL (ref 0–0.12)
EOSINOPHIL # BLD AUTO: 0.15 K/UL (ref 0–0.51)
EOSINOPHIL NFR BLD: 1.5 % (ref 0–6.9)
ERYTHROCYTE [DISTWIDTH] IN BLOOD BY AUTOMATED COUNT: 43.1 FL (ref 35.9–50)
HCT VFR BLD AUTO: 34.5 % (ref 37–47)
HGB BLD-MCNC: 11.9 G/DL (ref 12–16)
HOLDING TUBE BB 8507: NORMAL
HSV1 GG IGG SER-ACNC: 27.3 IV
HSV1+2 IGG SER IA-ACNC: >22.4 IV
HSV2 GG IGG SER-ACNC: 0.16 IV
IMM GRANULOCYTES # BLD AUTO: 0.03 K/UL (ref 0–0.11)
IMM GRANULOCYTES NFR BLD AUTO: 0.3 % (ref 0–0.9)
LYMPHOCYTES # BLD AUTO: 2.83 K/UL (ref 1–4.8)
LYMPHOCYTES NFR BLD: 28.6 % (ref 22–41)
MCH RBC QN AUTO: 31.4 PG (ref 27–33)
MCHC RBC AUTO-ENTMCNC: 34.5 G/DL (ref 33.6–35)
MCV RBC AUTO: 91 FL (ref 81.4–97.8)
MONOCYTES # BLD AUTO: 0.84 K/UL (ref 0–0.85)
MONOCYTES NFR BLD AUTO: 8.5 % (ref 0–13.4)
NEUTROPHILS # BLD AUTO: 6.01 K/UL (ref 2–7.15)
NEUTROPHILS NFR BLD: 60.8 % (ref 44–72)
NRBC # BLD AUTO: 0 K/UL
NRBC BLD-RTO: 0 /100 WBC
PLATELET # BLD AUTO: 232 K/UL (ref 164–446)
PMV BLD AUTO: 9.5 FL (ref 9–12.9)
RBC # BLD AUTO: 3.79 M/UL (ref 4.2–5.4)
WBC # BLD AUTO: 9.9 K/UL (ref 4.8–10.8)

## 2018-06-16 PROCEDURE — A9270 NON-COVERED ITEM OR SERVICE: HCPCS | Performed by: OBSTETRICS & GYNECOLOGY

## 2018-06-16 PROCEDURE — 700111 HCHG RX REV CODE 636 W/ 250 OVERRIDE (IP): Performed by: OBSTETRICS & GYNECOLOGY

## 2018-06-16 PROCEDURE — 85025 COMPLETE CBC W/AUTO DIFF WBC: CPT

## 2018-06-16 PROCEDURE — 303615 HCHG EPIDURAL/SPINAL ANESTHESIA FOR LABOR

## 2018-06-16 PROCEDURE — 770002 HCHG ROOM/CARE - OB PRIVATE (112)

## 2018-06-16 PROCEDURE — 304965 HCHG RECOVERY SERVICES

## 2018-06-16 PROCEDURE — 700111 HCHG RX REV CODE 636 W/ 250 OVERRIDE (IP)

## 2018-06-16 PROCEDURE — 36415 COLL VENOUS BLD VENIPUNCTURE: CPT

## 2018-06-16 PROCEDURE — 700102 HCHG RX REV CODE 250 W/ 637 OVERRIDE(OP): Performed by: OBSTETRICS & GYNECOLOGY

## 2018-06-16 PROCEDURE — 700105 HCHG RX REV CODE 258

## 2018-06-16 PROCEDURE — 59409 OBSTETRICAL CARE: CPT

## 2018-06-16 PROCEDURE — 700105 HCHG RX REV CODE 258: Performed by: OBSTETRICS & GYNECOLOGY

## 2018-06-16 PROCEDURE — 3E033VJ INTRODUCTION OF OTHER HORMONE INTO PERIPHERAL VEIN, PERCUTANEOUS APPROACH: ICD-10-PCS | Performed by: OBSTETRICS & GYNECOLOGY

## 2018-06-16 PROCEDURE — 10H07YZ INSERTION OF OTHER DEVICE INTO PRODUCTS OF CONCEPTION, VIA NATURAL OR ARTIFICIAL OPENING: ICD-10-PCS | Performed by: OBSTETRICS & GYNECOLOGY

## 2018-06-16 RX ORDER — MISOPROSTOL 200 UG/1
600 TABLET ORAL
Status: DISCONTINUED | OUTPATIENT
Start: 2018-06-16 | End: 2018-06-18 | Stop reason: HOSPADM

## 2018-06-16 RX ORDER — ROPIVACAINE HYDROCHLORIDE 2 MG/ML
INJECTION, SOLUTION EPIDURAL; INFILTRATION; PERINEURAL
Status: COMPLETED
Start: 2018-06-16 | End: 2018-06-16

## 2018-06-16 RX ORDER — VITAMIN A ACETATE, BETA CAROTENE, ASCORBIC ACID, CHOLECALCIFEROL, .ALPHA.-TOCOPHEROL ACETATE, DL-, THIAMINE MONONITRATE, RIBOFLAVIN, NIACINAMIDE, PYRIDOXINE HYDROCHLORIDE, FOLIC ACID, CYANOCOBALAMIN, CALCIUM CARBONATE, FERROUS FUMARATE, ZINC OXIDE, CUPRIC OXIDE 3080; 12; 120; 400; 1; 1.84; 3; 20; 22; 920; 25; 200; 27; 10; 2 [IU]/1; UG/1; MG/1; [IU]/1; MG/1; MG/1; MG/1; MG/1; MG/1; [IU]/1; MG/1; MG/1; MG/1; MG/1; MG/1
1 TABLET, FILM COATED ORAL EVERY MORNING
Status: DISCONTINUED | OUTPATIENT
Start: 2018-06-17 | End: 2018-06-18 | Stop reason: HOSPADM

## 2018-06-16 RX ORDER — SODIUM CHLORIDE, SODIUM LACTATE, POTASSIUM CHLORIDE, CALCIUM CHLORIDE 600; 310; 30; 20 MG/100ML; MG/100ML; MG/100ML; MG/100ML
INJECTION, SOLUTION INTRAVENOUS CONTINUOUS
Status: DISCONTINUED | OUTPATIENT
Start: 2018-06-16 | End: 2018-06-18 | Stop reason: HOSPADM

## 2018-06-16 RX ORDER — OXYCODONE HYDROCHLORIDE AND ACETAMINOPHEN 5; 325 MG/1; MG/1
1 TABLET ORAL EVERY 4 HOURS PRN
Status: DISCONTINUED | OUTPATIENT
Start: 2018-06-16 | End: 2018-06-18 | Stop reason: HOSPADM

## 2018-06-16 RX ORDER — SODIUM CHLORIDE, SODIUM LACTATE, POTASSIUM CHLORIDE, CALCIUM CHLORIDE 600; 310; 30; 20 MG/100ML; MG/100ML; MG/100ML; MG/100ML
INJECTION, SOLUTION INTRAVENOUS
Status: COMPLETED
Start: 2018-06-16 | End: 2018-06-16

## 2018-06-16 RX ORDER — MISOPROSTOL 200 UG/1
800 TABLET ORAL
Status: DISCONTINUED | OUTPATIENT
Start: 2018-06-16 | End: 2018-06-16 | Stop reason: HOSPADM

## 2018-06-16 RX ORDER — DOCUSATE SODIUM 100 MG/1
100 CAPSULE, LIQUID FILLED ORAL 2 TIMES DAILY PRN
Status: DISCONTINUED | OUTPATIENT
Start: 2018-06-16 | End: 2018-06-18 | Stop reason: HOSPADM

## 2018-06-16 RX ORDER — SODIUM CHLORIDE, SODIUM LACTATE, POTASSIUM CHLORIDE, CALCIUM CHLORIDE 600; 310; 30; 20 MG/100ML; MG/100ML; MG/100ML; MG/100ML
INJECTION, SOLUTION INTRAVENOUS PRN
Status: DISCONTINUED | OUTPATIENT
Start: 2018-06-16 | End: 2018-06-18 | Stop reason: HOSPADM

## 2018-06-16 RX ORDER — IBUPROFEN 600 MG/1
600 TABLET ORAL EVERY 6 HOURS PRN
Status: DISCONTINUED | OUTPATIENT
Start: 2018-06-16 | End: 2018-06-18 | Stop reason: HOSPADM

## 2018-06-16 RX ORDER — IBUPROFEN 600 MG/1
600 TABLET ORAL EVERY 6 HOURS PRN
Status: CANCELLED | OUTPATIENT
Start: 2018-06-16

## 2018-06-16 RX ORDER — ONDANSETRON 2 MG/ML
4 INJECTION INTRAMUSCULAR; INTRAVENOUS EVERY 6 HOURS PRN
Status: DISCONTINUED | OUTPATIENT
Start: 2018-06-16 | End: 2018-06-18 | Stop reason: HOSPADM

## 2018-06-16 RX ADMIN — IBUPROFEN 600 MG: 600 TABLET, FILM COATED ORAL at 19:53

## 2018-06-16 RX ADMIN — Medication 125 ML/HR: at 19:10

## 2018-06-16 RX ADMIN — ONDANSETRON 4 MG: 2 INJECTION INTRAMUSCULAR; INTRAVENOUS at 15:23

## 2018-06-16 RX ADMIN — FENTANYL CITRATE 100 MCG: 50 INJECTION, SOLUTION INTRAMUSCULAR; INTRAVENOUS at 11:26

## 2018-06-16 RX ADMIN — Medication 2 MILLI-UNITS/MIN: at 06:10

## 2018-06-16 RX ADMIN — ROPIVACAINE HYDROCHLORIDE 100 ML: 2 INJECTION, SOLUTION EPIDURAL; INFILTRATION at 11:52

## 2018-06-16 RX ADMIN — SODIUM CHLORIDE, POTASSIUM CHLORIDE, SODIUM LACTATE AND CALCIUM CHLORIDE: 600; 310; 30; 20 INJECTION, SOLUTION INTRAVENOUS at 12:06

## 2018-06-16 RX ADMIN — FENTANYL CITRATE 100 MCG: 50 INJECTION, SOLUTION INTRAMUSCULAR; INTRAVENOUS at 10:22

## 2018-06-16 RX ADMIN — SODIUM CHLORIDE, POTASSIUM CHLORIDE, SODIUM LACTATE AND CALCIUM CHLORIDE 1000 ML: 600; 310; 30; 20 INJECTION, SOLUTION INTRAVENOUS at 05:37

## 2018-06-16 ASSESSMENT — PAIN SCALES - GENERAL
PAINLEVEL_OUTOF10: 0
PAINLEVEL_OUTOF10: 8
PAINLEVEL_OUTOF10: 8

## 2018-06-16 ASSESSMENT — PATIENT HEALTH QUESTIONNAIRE - PHQ9
1. LITTLE INTEREST OR PLEASURE IN DOING THINGS: NOT AT ALL
2. FEELING DOWN, DEPRESSED, IRRITABLE, OR HOPELESS: NOT AT ALL
SUM OF ALL RESPONSES TO PHQ9 QUESTIONS 1 AND 2: 0

## 2018-06-16 ASSESSMENT — LIFESTYLE VARIABLES: ALCOHOL_USE: NO

## 2018-06-16 NOTE — PROGRESS NOTES
2320-pt here from elective IOL. Pt states she did not get the message to not come in until she was on her way. Pt placed on monitors (us and toco). POC dicussed with pt. Due to staffing IOL cannot be started yet.  Family at bedside. Pt states understanding.  0025-RN at bedside. Explaining the delay and that IOL will still not be started. Pt mother very upset about the delay. Her POC dicussed in detail. No Orders on chart, Dr Perez called orders received to start pitocin at 6am.   0052-Orders explained, Pt considering going home. Pt unsure if she wants to be induced anyway's. Pt going to talk options over. Pt removed monitors   0135-Pt decided to say for IOL. Pt understands pitocin will be started at 6am. Pt encouraged to rest   0530-IV started labs drawn.   0610-Pitocin started per orders.   0700-Report given to Pat BIRD

## 2018-06-16 NOTE — H&P
DATE OF ADMISSION:  06/15/2018    IDENTIFICATION:  This is a 22-year-old  1, para 0 with an EDC of   2018 and EGA of 39-1/7 who presents with chief complaint of induction of   labor.    HISTORY OF PRESENT ILLNESS:  This is a patient of mine who has gotten good   prenatal care.  Prenatal care has been uncomplicated.  She presents today for   induction of labor.  Fetal heart tracings reactive, category 1.  She is   darrick irregularly, after being started on Pitocin.  Her cervix is   currently 3-4, 50, -2, head is well applied to the cervix.  She is darrick   irregularly on Pitocin.    OBSTETRICAL HISTORY:  This is her first pregnancy.    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  No active   lesions currently.  Endometriosis.    MEDICAL HISTORY:  ALLERGIES:  None.    CURRENT MEDICATIONS:  Prenatal vitamins.    MEDICAL PROBLEMS:  Depression, anxiety history, currently stable, asthma,   endometriosis, migraine headaches, endocarditis.    SURGICAL HISTORY:  Appendectomy, laparoscopy for endometriosis.    SOCIAL HISTORY:  She denies alcohol, tobacco, or drug use.    FAMILY HISTORY:  Noncontributory.    REVIEW OF SYSTEMS:  Review of systems x12 is negative per AMA standards   available in chart.    LABORATORY DATA:  She is Rh positive, rubella immune.  She had a normal quad.    Glucola is normal.  GBS is negative.    PHYSICAL EXAMINATION:  VITAL SIGNS:  Patient is afebrile.  Vital signs within normal limits.  Fetal   heart tracing is reactive, category 1.  She is darrick irregularly.  GENERAL:  She is awake, alert, in no apparent distress.  NECK:  Supple.  HEART:  Regular.  CHEST:  Clear.  BREASTS:  Symmetrical.  ABDOMEN:  Soft, gravid, size appropriate for dates.  EXTREMITIES:  Negative.  GYNECOLOGIC:  EGBUS within normal limits.  No obvious herpetic lesions.    Vagina is pink and moist.  Cervix is midline.  Cervical exam as above.  Uterus   is midline, anteverted, size  appropriate for dates.  No adnexal masses.    ASSESSMENT:  At this time is:  1.  Pregnancy at term.  2.  Latent labor for induction of labor.  3.  Beta Streptococcus negative.    PLAN:  At this time:  1.  Admit.  2.  Fetal heart tone and contraction monitoring.  3.  Pain control.  4.  Pitocin.  5.  Anticipate normal spontaneous vaginal delivery.       ____________________________________     MD PETEY Holly / BRENDAN    DD:  06/16/2018 09:27:14  DT:  06/16/2018 10:34:48    D#:  [id number]  Job#:  [id number]

## 2018-06-16 NOTE — PROGRESS NOTES
Comfortable with epi  C/c/++  Reg uc on pit  Cat one  No urge to push yet.  Will labor down until she feels urge

## 2018-06-16 NOTE — PROGRESS NOTES
0700) Bedside report received from MAICOL Jones RN, POC discussed at bedside, patient denies any needs or complaints at this time, safety precautions in place  0930) Dr. Cohen at bedside, AROM clear fluid, SVE 3-4/50/-2, IUPC placed  1330) SVE 5/80/-2  1345) Dr. Cohen at bedside to assess patient  1455) SVE 6/90/-2, FSE placed by EDILBERTO Hart RN  1621) Dr. Cohen at bedside, SVE C/+2  1814) Dr. Cohen at bedside to attend delivery  1817)  viable male 8/9 APGARS  1820) Spontaneous delivery of intact placenta  1900) Bedside report given to MAICOL Barth RN, POC discussed at bedside, patient denies any needs or complaints

## 2018-06-16 NOTE — CARE PLAN
Problem: Pain  Goal: Alleviation of Pain or a reduction in pain to the patient's comfort goal  Assessing and monitoring patient's pain and implementing pharmacologic means of pain management as needed. Educating patient concerning importance of verbalizing pain     Problem: Risk for Infection, Impaired Wound Healing  Goal: Remain free from signs and symptoms of infection  Assessing and monitoring patient for signs and symptoms of infection and implementing precautions as needed. Educating patient concerning the importance of hand washing

## 2018-06-17 LAB
ERYTHROCYTE [DISTWIDTH] IN BLOOD BY AUTOMATED COUNT: 43.9 FL (ref 35.9–50)
GLUCOSE BLD-MCNC: 102 MG/DL (ref 65–99)
HCT VFR BLD AUTO: 33.1 % (ref 37–47)
HGB BLD-MCNC: 11.3 G/DL (ref 12–16)
MCH RBC QN AUTO: 31.3 PG (ref 27–33)
MCHC RBC AUTO-ENTMCNC: 34.1 G/DL (ref 33.6–35)
MCV RBC AUTO: 91.7 FL (ref 81.4–97.8)
PLATELET # BLD AUTO: 207 K/UL (ref 164–446)
PMV BLD AUTO: 9.4 FL (ref 9–12.9)
RBC # BLD AUTO: 3.61 M/UL (ref 4.2–5.4)
WBC # BLD AUTO: 16.1 K/UL (ref 4.8–10.8)

## 2018-06-17 PROCEDURE — 3E0234Z INTRODUCTION OF SERUM, TOXOID AND VACCINE INTO MUSCLE, PERCUTANEOUS APPROACH: ICD-10-PCS | Performed by: OBSTETRICS & GYNECOLOGY

## 2018-06-17 PROCEDURE — 700102 HCHG RX REV CODE 250 W/ 637 OVERRIDE(OP): Performed by: OBSTETRICS & GYNECOLOGY

## 2018-06-17 PROCEDURE — 90471 IMMUNIZATION ADMIN: CPT

## 2018-06-17 PROCEDURE — 36415 COLL VENOUS BLD VENIPUNCTURE: CPT

## 2018-06-17 PROCEDURE — 700112 HCHG RX REV CODE 229: Performed by: OBSTETRICS & GYNECOLOGY

## 2018-06-17 PROCEDURE — 85027 COMPLETE CBC AUTOMATED: CPT

## 2018-06-17 PROCEDURE — A9270 NON-COVERED ITEM OR SERVICE: HCPCS | Performed by: OBSTETRICS & GYNECOLOGY

## 2018-06-17 PROCEDURE — 770002 HCHG ROOM/CARE - OB PRIVATE (112)

## 2018-06-17 PROCEDURE — 90732 PPSV23 VACC 2 YRS+ SUBQ/IM: CPT | Performed by: OBSTETRICS & GYNECOLOGY

## 2018-06-17 PROCEDURE — 700111 HCHG RX REV CODE 636 W/ 250 OVERRIDE (IP): Performed by: OBSTETRICS & GYNECOLOGY

## 2018-06-17 PROCEDURE — 82962 GLUCOSE BLOOD TEST: CPT

## 2018-06-17 RX ORDER — IBUPROFEN 600 MG/1
600 TABLET ORAL EVERY 6 HOURS PRN
Qty: 30 TAB | Refills: 1 | Status: SHIPPED | OUTPATIENT
Start: 2018-06-17 | End: 2019-06-04

## 2018-06-17 RX ADMIN — IBUPROFEN 600 MG: 600 TABLET, FILM COATED ORAL at 20:13

## 2018-06-17 RX ADMIN — OXYCODONE HYDROCHLORIDE AND ACETAMINOPHEN 1 TABLET: 5; 325 TABLET ORAL at 06:37

## 2018-06-17 RX ADMIN — Medication 1 TABLET: at 13:36

## 2018-06-17 RX ADMIN — DOCUSATE SODIUM 100 MG: 100 CAPSULE ORAL at 06:37

## 2018-06-17 RX ADMIN — IBUPROFEN 600 MG: 600 TABLET, FILM COATED ORAL at 06:36

## 2018-06-17 RX ADMIN — PNEUMOCOCCAL VACCINE POLYVALENT 25 MCG
25; 25; 25; 25; 25; 25; 25; 25; 25; 25; 25; 25; 25; 25; 25; 25; 25; 25; 25; 25; 25; 25; 25 INJECTION, SOLUTION INTRAMUSCULAR; SUBCUTANEOUS at 20:12

## 2018-06-17 RX ADMIN — IBUPROFEN 600 MG: 600 TABLET, FILM COATED ORAL at 13:37

## 2018-06-17 ASSESSMENT — PAIN SCALES - GENERAL
PAINLEVEL_OUTOF10: 0
PAINLEVEL_OUTOF10: 2
PAINLEVEL_OUTOF10: 1
PAINLEVEL_OUTOF10: 0
PAINLEVEL_OUTOF10: 1
PAINLEVEL_OUTOF10: 5

## 2018-06-17 NOTE — PROGRESS NOTES
[facility] - 18 EGA - 39.1     - Report received from NILSA Dougherty RN. FOB at bedside. POC discussed with pt and family members, all questions answered. Fundus firm lochia scant.   Pt up to the bathroom at this time with MAICOL Lanza RN. Pt able to void.    Pt transferred to  in stable condition via wheel chair with infant in arms. Report given to Tiffanie BIRD. Infant bands matched x2 cuddles active.

## 2018-06-17 NOTE — CARE PLAN
Problem: Knowledge Deficit  Goal: Patient/Support person demonstrates understanding regarding the progression of labor, available options and participates in decision-making process    Intervention: Assess patient's preparation, knowledge level, and expectations/birth plan  Induction orders reviewed with pt. Pitocin to be started at 6 am per MD orders. Pt states understanding and denies any questions       Problem: Pain  Goal: Alleviation of Pain or a reduction in pain to the patient's comfort goal    Intervention: Initial pain assessment  Pain options explained to pt, pt states understanding and knows to call out when she want interventions

## 2018-06-17 NOTE — DISCHARGE PLANNING
Medical Social Work    Infant: Parish QIU met with MOB at bedside to complete assessment.       Discharge planning assessment     Reason for referral: MOB has history of depression  Address: [address] [place] [address] [address] [place] [id number]  Type of living situation: Lives with FOB Anthony Toscano  Mom Diagnosis: Pregnancy   Baby Diagnosis: Alexander  Primary language:    Father of the baby: Anthony Toscano  Involved in baby's care? Yes  Contact information: [phone]    Support system: FO  Source of feeding: Breast feed    Supplies for infant: Yes, plus has a basinet and pack and play  Mom's insurance: hometown health and health plan of [place] Medicaid  Mother employed: No  Other children: No    Financial hardship/income: Receives food stamps    CPS History: No  Psychiatric history: History of depression. Has been on anti-depression medications In the past. MOB ha been off the medications for a awhile and has felt fine. Educated about post partum depression.   Domestic violence: No  Drug/ETOH: No  Resources provided:  Pediatrician information     Social Work clearance: Cleared from

## 2018-06-17 NOTE — DELIVERY NOTE
DATE OF SERVICE:  2018    IDENTIFICATION:  The patient is a 22-year-old  1, para 0 with an EDC   2018, presents at 39-1/7 for induction of labor.  Her cervix was 3-4,   50, -2.  She is subsequently admitted, started on Pitocin, known history of   HSV exposure in the past, bright light exam was negative.  Eventually, she was   ____ clear and IUPC was placed.  She received an epidural for pain control,   progressed over normal labor curve to complete with an overall reassuring   fetal heart tracing.  At complete, she was able to push baby down to a +3   station, extend the baby's head and deliver atraumatically.  The nares and   mouth were bulb suctioned.  There was no nuchal cord.  The shoulders and body   followed without complication.  The cord was clamped and cut after delayed.    Cord gases were not held.  The infant was handed off to awaiting nursing team.    At this point, the placenta delivered intact 3-vessel cord.  The uterus   firmed up nicely after 20 units of Pitocin.  Cervix was intact.  There were no   lacerations.  Estimated blood loss was 300 mL.  Patient and baby to recovery   room in stable condition.      FINDINGS:  Include a male infant with Apgars of 8 and 9.  There were no   complications.       ____________________________________     MD PETEY Holly / BRENDAN    DD:  2018 18:26:27  DT:  2018 20:12:28    D#:  [id number]  Job#:  [id number]

## 2018-06-17 NOTE — CARE PLAN
Problem: Altered physiologic condition related to immediate post-delivery state and potential for bleeding/hemorrhage  Goal: Patient physiologically stable as evidenced by normal lochia, palpable uterine involution and vital signs within normal limits  Outcome: PROGRESSING AS EXPECTED  Lochia light, fundus firm, VSS.     Problem: Alteration in comfort related to episiotomy, vaginal repair and/or after birth pains  Goal: Patient verbalizes acceptable pain level  Outcome: PROGRESSING AS EXPECTED  Patient states pain is 0/10. Educated on pain management plan. PRN medication times updated on whiteboard. Rounding in place.

## 2018-06-17 NOTE — PROGRESS NOTES
Hospital Day : 1    S: doing well; luis diet; min bleed    O:  Vitals:    18 1857 18 2100 18 0000 18 0400   BP: 122/58 125/68 120/70 101/69   Pulse: 86 76 72 67   Resp:     Temp:  36.6 °C (97.8 °F) 36.7 °C (98.1 °F) 36.4 °C (97.6 °F)   TempSrc:       SpO2:  95% 92% 95%   Weight:       Height:           Recent Labs      18   0526  18   0519   WBC  9.9  16.1*   RBC  3.79*  3.61*   HEMOGLOBIN  11.9*  11.3*   HEMATOCRIT  34.5*  33.1*   MCV  91.0  91.7   MCH  31.4  31.3   MCHC  34.5  34.1   RDW  43.1  43.9   PLATELETCT  232  207   MPV  9.5  9.4             abd soft ff    A: pp1 sp  doing well; gbs neg    P: dc

## 2018-06-17 NOTE — PROGRESS NOTES
Report received from Saranya COBB RN. Bands verified with infant. Assessment completed. Patient oriented to room and all policies and procedures including rounding, pain management plan, attempting to feed infant every 2-3 hours, safe sleep practices, skin/skin, s/s of heavy bleeding, infant security, emergency cords, skylight and call light. All needs addressed at this time. Rounding in place.

## 2018-06-18 VITALS
BODY MASS INDEX: 38.31 KG/M2 | OXYGEN SATURATION: 95 % | TEMPERATURE: 98 F | WEIGHT: 229.94 LBS | HEART RATE: 74 BPM | HEIGHT: 65 IN | SYSTOLIC BLOOD PRESSURE: 111 MMHG | RESPIRATION RATE: 16 BRPM | DIASTOLIC BLOOD PRESSURE: 74 MMHG

## 2018-06-18 PROCEDURE — 700112 HCHG RX REV CODE 229: Performed by: OBSTETRICS & GYNECOLOGY

## 2018-06-18 PROCEDURE — A9270 NON-COVERED ITEM OR SERVICE: HCPCS | Performed by: OBSTETRICS & GYNECOLOGY

## 2018-06-18 PROCEDURE — 700102 HCHG RX REV CODE 250 W/ 637 OVERRIDE(OP): Performed by: OBSTETRICS & GYNECOLOGY

## 2018-06-18 RX ADMIN — IBUPROFEN 600 MG: 600 TABLET, FILM COATED ORAL at 09:08

## 2018-06-18 RX ADMIN — DOCUSATE SODIUM 100 MG: 100 CAPSULE ORAL at 09:07

## 2018-06-18 RX ADMIN — Medication 1 TABLET: at 09:07

## 2018-06-18 ASSESSMENT — PAIN SCALES - GENERAL
PAINLEVEL_OUTOF10: 6
PAINLEVEL_OUTOF10: 0

## 2018-06-18 NOTE — PROGRESS NOTES
Report received from Tiffanie BIRD @ 8130. Discussed plan of care. Assessment done. Denies pain. Encouraged patient to ask questions since this is her first baby. Will check at intervals.

## 2018-06-18 NOTE — PROGRESS NOTES
1900: Report received from MARGE Cid. Patient assessment completed. All concerns addressed at this time. Rounding in place  0300: Patient feeling very anxious and crying, stating that she does not feel like she is taking good care of infant. Techniques reviewed for quieting a fussy infant. Reassurance provided, do not disturb sign placed, encouraged to rest.

## 2018-06-18 NOTE — CARE PLAN
Problem: Potential for postpartum infection related to presence of episiotomy/vaginal tear and/or uterine contamination  Goal: Patient will be absent from signs and symptoms of infection  Outcome: PROGRESSING AS EXPECTED  Patient shows no s/s of infection. Afebrile. Instructed on proper pericare.     Problem: Alteration in comfort related to episiotomy, vaginal repair and/or after birth pains  Goal: Patient is able to ambulate, care for self and infant  Outcome: PROGRESSING AS EXPECTED  Patient ambulates and cares well for self and infant independently. Wishes to call if she requires pain intervention. Currently pain is 0/10. PRN medication times updated on whiteboard.

## 2018-06-18 NOTE — PROGRESS NOTES
Hospital Day : 2    S: doing well    O:  Vitals:    18 0400 18 0800 18 1800 18   BP: 101/69 109/69 120/69 107/62   Pulse: 67 72 89 77   Resp:    Temp: 36.4 °C (97.6 °F) 36.6 °C (97.8 °F) 37.1 °C (98.7 °F) 36.8 °C (98.3 °F)   TempSrc:       SpO2: 95% 94%  93%   Weight:       Height:           Recent Labs      18   0526  18   0519   WBC  9.9  16.1*   RBC  3.79*  3.61*   HEMOGLOBIN  11.9*  11.3*   HEMATOCRIT  34.5*  33.1*   MCV  91.0  91.7   MCH  31.4  31.3   MCHC  34.5  34.1   RDW  43.1  43.9   PLATELETCT  232  207   MPV  9.5  9.4             abd soft    A: pp 2 sp     P: dc

## 2018-06-18 NOTE — DISCHARGE SUMMARY
DATE OF ADMISSION:  06/16/2018    DATE OF DISCHARGE:  06/18/2018    ADMITTING DIAGNOSES:  1.  Pregnancy at 39-1/7 weeks for induction of labor.  2.  Beta strep negative.    DISCHARGE DIAGNOSES:  1.  Pregnancy at 39-1/7 weeks for induction of labor.  2.  Beta strep negative.  3.  Status post normal spontaneous vaginal delivery.    HOSPITAL COURSE IN DETAIL:  This patient was admitted on the aforementioned   date for induction of labor, started on Pitocin, received an epidural,   progressing over normal labor curve and eventually delivered a male infant   with Apgars 8 and 9 on the 16th.  On postpartum day #1, she is doing well   without complaint, tolerating regular diet.  She decided to stay another day   as the baby's bilirubin was in question.  Today, postpartum day #2, she   desires discharge home.  She is afebrile.  Her vital signs are within normal   limits.  Her H and H is stable at 11 and 33.    ASSESSMENT:  At this time is postpartum day #2, status post normal spontaneous   vaginal delivery, doing well, desires discharge home.    PLAN:  At this time:  1.  Discharge home.  2.  Follow up in 6 weeks.  3.  Pelvic rest.  4.  Lifting precautions.  5.  Scripts for Motrin written.       ____________________________________     MD PETEY Holly / BRENDAN    DD:  06/18/2018 06:17:22  DT:  06/18/2018 06:33:28    D#:  [id number]  Job#:  [id number]

## 2018-06-18 NOTE — DISCHARGE INSTRUCTIONS
POSTPARTUM DISCHARGE INSTRUCTIONS FOR MOM    YOB: 1995   Age: 22 y.o.               Admit Date: 6/15/2018     Discharge Date: 6/18/2018  Attending Doctor:  Pedro Luis Max M.D.                  Allergies:  Patient has no known allergies.    Discharged to home by car. Discharged via wheelchair, hospital escort: Yes.  Special equipment needed: Not Applicable  Belongings with: Personal  Be sure to schedule a follow-up appointment with your primary care doctor or any specialists as instructed.     Discharge Plan:   Diet Plan: Discussed  Activity Level: Discussed  Confirmed Follow up Appointment: Patient to Call and Schedule Appointment  Confirmed Symptoms Management: Discussed  Medication Reconciliation Updated: Yes  Pneumococcal Vaccine Administered/Refused: Given (See MAR)    REASONS TO CALL YOUR OBSTETRICIAN:  1.   Persistent fever or shaking chills (Temperature higher than 100.4)  2.   Heavy bleeding (soaking more than 1 pad per hour); Passing clots  3.   Foul odor from vagina  4.   Mastitis (Breast infection; breast pain, chills, fever, redness)  5.   Urinary pain, burning or frequency  6.   Severe depression longer than 24 hours    HAND WASHING  · Prior to handling the baby.  · Before breastfeeding or bottle feeding baby.  · After using the bathroom or changing the baby's diaper.    VAGINAL CARE  · Nothing inside vagina for 6 weeks: no sexual intercourse, tampons or douching.  · Bleeding may continue for 2-4 weeks.  Amount may vary.    · Call your physician for heavy bleeding which means soaking more than 1 pad per hour    BIRTH CONTROL  · It is possible to become pregnant at any time after delivery and while breastfeeding.  · Plan to discuss a method of birth control with your physician at your follow up visit. visit.    DIET AND ELIMINATION  · Eating more fiber (bran cereal, fruits, and vegetables) and drinking plenty of fluids will help to avoid constipation.  · Urinary frequency after childbirth is  "normal.    POSTPARTUM BLUES  During the first few days after birth, you may experience a sense of the \"blues\" which may include impatience, irritability or even crying.  These feeling come and go quickly.  However, as many as 1 in 10 women experience emotional symptoms known as postpartum depression.    Postpartum depression:  May start as early as the second or third day after delivery or take several weeks or months to develop.  Symptoms of \"blues\" are present, but are more intense:  Crying spells; loss of appetite; feelings of hopelessness or loss of control; fear of touching the baby; over concern or no concern at all about the baby; little or no concern about your own appearance/caring for yourself; and/or inability to sleep or excessive sleeping.  Contact your physician if you are experiencing any of these symptoms.    Crisis Hotline:  · [place] Crisis Hotline:  6-221-BLWOSNI  Or 1-629.711.7990  · [place] Crisis Hotline:  1-363.301.9946  Or 455-327-4312    PREVENTING SHAKEN BABY:  If you are angry or stressed, PUT THE BABY IN THE CRIB, step away, take some deep breaths, and wait until you are calm to care for the baby.  DO NOT SHAKE THE BABY.  You are not alone, call a supporter for help.    · Crisis [facility] Center 24/7 crisis line 620-145-6063 or 1-112.450.7396  · You can also text them, text \"ANSWER\" to 922875    QUIT SMOKING/TOBACCO USE:  I understand the use of any tobacco products increases my chance of suffering from future heart disease and could cause other illnesses which may shorten my life. Quitting the use of tobacco products is the single most important thing I can do to improve my health. For further information on smoking / tobacco cessation call a Toll Free Quit Line at 1-236.465.3129 (*[employer] [place]) or 1-432.800.6183 (American Lung Association) or you can access the web based program at www.lungusa.org.    · [place] Tobacco Users Help Line:  (250) 810-8817       Toll Free: " 2-548-264-0760  · Quit Tobacco Program [facility] (200)450-3549    DEPRESSION / SUICIDE RISK:  As you are discharged from this [facility], it is important to learn how to keep safe from harming yourself.    Recognize the warning signs:  · Abrupt changes in personality, positive or negative- including increase in energy   · Giving away possessions  · Change in eating patterns- significant weight changes-  positive or negative  · Change in sleeping patterns- unable to sleep or sleeping all the time   · Unwillingness or inability to communicate  · Depression  · Unusual sadness, discouragement and loneliness  · Talk of wanting to die  · Neglect of personal appearance   · Rebelliousness- reckless behavior  · Withdrawal from people/activities they love  · Confusion- inability to concentrate     If you or a loved one observes any of these behaviors or has concerns about self-harm, here's what you can do:  · Talk about it- your feelings and reasons for harming yourself  · Remove any means that you might use to hurt yourself (examples: pills, rope, extension cords, firearm)  · Get professional help from the community (Mental Health, Substance Abuse, psychological counseling)  · Do not be alone:Call your Safe Contact- someone whom you trust who will be there for you.  · Call your local CRISIS HOTLINE 204-9759 or 405-308-5734  · Call your local Children's Mobile Crisis Response Team [place] (308) 962-2200 or www.PHRQL  · Call the toll free National Suicide Prevention Hotlines   · National Suicide Prevention Lifeline 257-803-UVKZ (6567)  · National Hope Line Network 800-SUICIDE (871-5832)    DISCHARGE SURVEY:  Thank you for choosing [facility].  We hope we provided you with very good care.  You may be receiving a survey in the mail.  Please fill it out.  Your opinion is valuable to us.    ADDITIONAL EDUCATIONAL MATERIALS GIVEN TO PATIENT:        My signature on this form  indicates that:  1.  I have reviewed and understand the above information  2.  My questions regarding this information have been answered to my satisfaction.  3.  I have formulated a plan with my discharge nurse to obtain my prescribed medication for home.

## 2018-06-21 ENCOUNTER — OFFICE VISIT (OUTPATIENT)
Dept: URGENT CARE | Facility: PHYSICIAN GROUP | Age: 23
End: 2018-06-21
Payer: COMMERCIAL

## 2018-06-21 VITALS
BODY MASS INDEX: 38.07 KG/M2 | RESPIRATION RATE: 16 BRPM | HEART RATE: 77 BPM | WEIGHT: 223 LBS | SYSTOLIC BLOOD PRESSURE: 124 MMHG | HEIGHT: 64 IN | DIASTOLIC BLOOD PRESSURE: 74 MMHG | OXYGEN SATURATION: 97 % | TEMPERATURE: 97.4 F

## 2018-06-21 DIAGNOSIS — O92.79 POSTPARTUM BREAST ENGORGEMENT: ICD-10-CM

## 2018-06-21 DIAGNOSIS — N64.4 BREAST PAIN IN FEMALE: ICD-10-CM

## 2018-06-21 PROCEDURE — 99214 OFFICE O/P EST MOD 30 MIN: CPT | Performed by: PHYSICIAN ASSISTANT

## 2018-06-21 RX ORDER — DICLOXACILLIN SODIUM 250 MG/1
500 CAPSULE ORAL 4 TIMES DAILY
Qty: 56 CAP | Refills: 0 | Status: SHIPPED | OUTPATIENT
Start: 2018-06-21 | End: 2018-06-28

## 2018-06-21 ASSESSMENT — ENCOUNTER SYMPTOMS
DIARRHEA: 0
BREAST PAIN: 1
VOMITING: 0
FEVER: 0
CHILLS: 0
ABDOMINAL PAIN: 0
EYE DISCHARGE: 0
SORE THROAT: 0
FALLS: 0
MYALGIAS: 0
EYE REDNESS: 0
NAUSEA: 0
COUGH: 0
FATIGUE: 0

## 2018-06-21 ASSESSMENT — PAIN SCALES - GENERAL: PAINLEVEL: 6=MODERATE PAIN

## 2018-06-21 NOTE — PROGRESS NOTES
"Subjective:      Alexis Toscano is a 22 y.o. female who presents with Breast Pain (hot,hard, bjukb2etct )            Pt is a 22-year-old female presents with bilateral breast pain and fullness in the last 4 days. Patient is postpartum day 6 which she reports that her \"milk did not come\" after her delivery. For the last few days her breasts become full, became and are painful. She does report slight increased warmth began today as well to bilateral breasts. She denies associated fevers, chills or redness. Patient admits that she has not been pumping and she currently does not want to proceed at this time.      Breast Pain   This is a new problem. Episode onset: 4 days ago. The problem occurs constantly. The problem has been gradually worsening. Pertinent negatives include no abdominal pain, chills, congestion, coughing, fatigue, fever, myalgias, nausea, rash, sore throat or vomiting. Nothing aggravates the symptoms. She has tried nothing for the symptoms.       Review of Systems   Constitutional: Negative for chills, fatigue, fever and malaise/fatigue.   HENT: Negative for congestion and sore throat.    Eyes: Negative for discharge and redness.   Respiratory: Negative for cough.    Gastrointestinal: Negative for abdominal pain, diarrhea, nausea and vomiting.   Genitourinary: Negative for frequency and hematuria.   Musculoskeletal: Negative for falls, joint pain and myalgias.   Skin: Negative for itching and rash.   All other systems reviewed and are negative.         Objective:     /74   Pulse 77   Temp 36.3 °C (97.4 °F)   Resp 16   Ht 1.626 m (5' 4\")   Wt 101.2 kg (223 lb)   LMP 09/15/2017   SpO2 97%   BMI 38.28 kg/m²    PMH:  has a past medical history of Anesthesia; Anxiety; Asthma; Axillary abscess (9/30/2012); Breath shortness; Cold; Cold; Gynecological disorder; Pain; Pneumonia (2015); Psychiatric problem; and Snoring.  MEDS:   Current Outpatient Prescriptions:   •  dicloxacillin (DYNAPEN) " 250 MG Cap, Take 2 Caps by mouth 4 times a day for 7 days., Disp: 56 Cap, Rfl: 0  •  ibuprofen (MOTRIN) 600 MG Tab, Take 1 Tab by mouth every 6 hours as needed (For cramping after delivery; do not give if patient is receiving ketorolac (Toradol))., Disp: 30 Tab, Rfl: 1  •  Prenatal Vit-Fe Fumarate-FA (PRENATAL PO), Take 1 Tab by mouth every day., Disp: , Rfl:   •  albuterol 108 (90 BASE) MCG/ACT Aero Soln inhalation aerosol, Inhale 2 Puffs by mouth every 6 hours as needed for Shortness of Breath., Disp: 8.5 g, Rfl: 1  ALLERGIES: No Known Allergies  SURGHX:   Past Surgical History:   Procedure Laterality Date   • TONSILLECTOMY Bilateral 3/31/2017    Procedure: TONSILLECTOMY;  Surgeon: Ilya Escalona M.D.;  Location: SURGERY SAME DAY [facility];  Service:    • PELVISCOPY N/A 2/14/2017    Procedure: PELVISCOPY;  Surgeon: Sweta Yanez M.D.;  Location: SURGERY [facility];  Service:    • APPENDECTOMY  2008     SOCHX:  reports that she quit smoking about 7 months ago. Her smoking use included Cigarettes. She has a 1.50 pack-year smoking history. She has never used smokeless tobacco. She reports that she does not drink alcohol or use drugs.  FH: Family history was reviewed, no pertinent findings to report    Physical Exam   Constitutional: She is oriented to person, place, and time. She appears well-developed and well-nourished. No distress.   HENT:   Head: Normocephalic and atraumatic.   Eyes: Conjunctivae and EOM are normal. Pupils are equal, round, and reactive to light.   Neck: Normal range of motion. Neck supple. No tracheal deviation present.   Cardiovascular: Normal rate.    Pulmonary/Chest: Effort normal. Right breast exhibits nipple discharge and tenderness. Left breast exhibits nipple discharge and tenderness. Breasts are symmetrical.   Diffuse firmness and engorgement of bilateral breasts- currently leaking.   Minimal increased warmth throughout- mild tenderness throughout- without erythema or  noted abscess formation.      Genitourinary: There is breast tenderness.   Musculoskeletal: Normal range of motion. She exhibits no edema.   Neurological: She is alert and oriented to person, place, and time.   Skin: Skin is warm. No rash noted.   Psychiatric: She has a normal mood and affect. Her behavior is normal. Judgment and thought content normal.   Vitals reviewed.              Assessment/Plan:     1. Breast pain in female  2. Postpartum breast engorgement    This time patient's symptoms are bilateral, without erythema, or systemic symptoms of body aches or fevers-symptoms are more consistent with breast engorgement at this time however signs and symptoms of mastitis that were discussed today along with a continued antibiotic of which the patient is to call Dr. Max for follow up. Patient is to start on the antibiotic if she develops fevers, chills, redness or increased pain after she has failed conservative therapies of breast pumping, massage and warm hot compresses.  Patient given precautionary s/sx that mandate immediate follow up and evaluation in the ED. Advised of risks of not doing so.    DDX, Supportive care, and indications for immediate follow-up discussed with patient.    Instructed to return to clinic or nearest emergency department if we are not available for any change in condition, further concerns, or worsening of symptoms.    The patient demonstrated a good understanding and agreed with the treatment plan.  Please note that this dictation was created using voice recognition software. I have made every reasonable attempt to correct obvious errors, but I expect that there are errors of grammar and possibly content that I did not discover before finalizing the note.

## 2018-06-24 LAB — TEST NAME 95000: NORMAL

## 2018-07-04 ENCOUNTER — OFFICE VISIT (OUTPATIENT)
Dept: URGENT CARE | Facility: PHYSICIAN GROUP | Age: 23
End: 2018-07-04
Payer: COMMERCIAL

## 2018-07-04 VITALS
SYSTOLIC BLOOD PRESSURE: 110 MMHG | RESPIRATION RATE: 16 BRPM | DIASTOLIC BLOOD PRESSURE: 80 MMHG | HEART RATE: 70 BPM | HEIGHT: 64 IN | BODY MASS INDEX: 37.22 KG/M2 | OXYGEN SATURATION: 95 % | TEMPERATURE: 97.5 F | WEIGHT: 218 LBS

## 2018-07-04 DIAGNOSIS — K11.20 SIALADENITIS: ICD-10-CM

## 2018-07-04 PROCEDURE — 99214 OFFICE O/P EST MOD 30 MIN: CPT | Performed by: FAMILY MEDICINE

## 2018-07-04 RX ORDER — AMOXICILLIN AND CLAVULANATE POTASSIUM 875; 125 MG/1; MG/1
1 TABLET, FILM COATED ORAL 2 TIMES DAILY
Qty: 14 TAB | Refills: 0 | Status: SHIPPED | OUTPATIENT
Start: 2018-07-04 | End: 2018-07-11

## 2018-07-04 ASSESSMENT — ENCOUNTER SYMPTOMS
VOMITING: 0
PAIN: 1
NAUSEA: 0
SORE THROAT: 1
CHILLS: 0
FEVER: 0

## 2018-07-04 ASSESSMENT — PAIN SCALES - GENERAL: PAINLEVEL: 4=SLIGHT-MODERATE PAIN

## 2018-07-04 NOTE — PROGRESS NOTES
"Subjective:   Alexis Toscano is a 22 y.o. female who presents for Pain (inside mouth on right side, x 3 days)        Pain   This is a new problem. The current episode started in the past 7 days. The problem occurs constantly. The problem has been rapidly worsening. Associated symptoms include a sore throat. Pertinent negatives include no chills, fever, nausea or vomiting.     Review of Systems   Constitutional: Negative for chills and fever.   HENT: Positive for sore throat.    Gastrointestinal: Negative for nausea and vomiting.     No Known Allergies   Objective:   /80   Pulse 70   Temp 36.4 °C (97.5 °F)   Resp 16   Ht 1.626 m (5' 4\")   Wt 98.9 kg (218 lb)   LMP 09/15/2017   SpO2 95%   BMI 37.42 kg/m²   Physical Exam   Constitutional: She is oriented to person, place, and time. She appears well-developed and well-nourished. No distress.   HENT:   Head: Normocephalic and atraumatic.   Mouth/Throat: Oropharynx is clear and moist.   Swelling of right buccal salivary ducts noted   Eyes: Conjunctivae and EOM are normal. Pupils are equal, round, and reactive to light.   Cardiovascular: Normal rate and regular rhythm.    No murmur heard.  Pulmonary/Chest: Effort normal and breath sounds normal. No respiratory distress.   Abdominal: Soft. She exhibits no distension. There is no tenderness.   Neurological: She is alert and oriented to person, place, and time. She has normal reflexes. No sensory deficit.   Skin: Skin is warm and dry.   Psychiatric: She has a normal mood and affect.         Assessment/Plan:   Assessment    1. Sialadenitis  - amoxicillin-clavulanate (AUGMENTIN) 875-125 MG Tab; Take 1 Tab by mouth 2 times a day for 7 days.  Dispense: 14 Tab; Refill: 0    Differential diagnosis, natural history, supportive care, and indications for immediate follow-up discussed.      "

## 2019-05-27 ENCOUNTER — OFFICE VISIT (OUTPATIENT)
Dept: URGENT CARE | Facility: CLINIC | Age: 24
End: 2019-05-27
Payer: COMMERCIAL

## 2019-06-04 ENCOUNTER — APPOINTMENT (OUTPATIENT)
Dept: RADIOLOGY | Facility: MEDICAL CENTER | Age: 24
End: 2019-06-04
Attending: EMERGENCY MEDICINE
Payer: COMMERCIAL

## 2019-06-04 ENCOUNTER — HOSPITAL ENCOUNTER (EMERGENCY)
Facility: MEDICAL CENTER | Age: 24
End: 2019-06-04
Attending: EMERGENCY MEDICINE
Payer: COMMERCIAL

## 2019-06-04 VITALS
HEIGHT: 65 IN | DIASTOLIC BLOOD PRESSURE: 45 MMHG | HEART RATE: 62 BPM | WEIGHT: 237.88 LBS | SYSTOLIC BLOOD PRESSURE: 100 MMHG | BODY MASS INDEX: 39.63 KG/M2 | OXYGEN SATURATION: 95 % | RESPIRATION RATE: 16 BRPM | TEMPERATURE: 97.7 F

## 2019-06-04 DIAGNOSIS — G43.009 MIGRAINE WITHOUT AURA AND WITHOUT STATUS MIGRAINOSUS, NOT INTRACTABLE: ICD-10-CM

## 2019-06-04 PROCEDURE — 700111 HCHG RX REV CODE 636 W/ 250 OVERRIDE (IP): Performed by: EMERGENCY MEDICINE

## 2019-06-04 PROCEDURE — 96375 TX/PRO/DX INJ NEW DRUG ADDON: CPT

## 2019-06-04 PROCEDURE — 70450 CT HEAD/BRAIN W/O DYE: CPT

## 2019-06-04 PROCEDURE — 99284 EMERGENCY DEPT VISIT MOD MDM: CPT

## 2019-06-04 PROCEDURE — 96374 THER/PROPH/DIAG INJ IV PUSH: CPT

## 2019-06-04 RX ORDER — ESCITALOPRAM OXALATE 20 MG/1
20 TABLET ORAL DAILY
COMMUNITY
End: 2020-07-14

## 2019-06-04 RX ORDER — METHYLPHENIDATE HYDROCHLORIDE 5 MG/1
5 TABLET ORAL 2 TIMES DAILY
COMMUNITY
End: 2020-07-14

## 2019-06-04 RX ORDER — DIPHENHYDRAMINE HYDROCHLORIDE 50 MG/ML
12.5 INJECTION INTRAMUSCULAR; INTRAVENOUS ONCE
Status: COMPLETED | OUTPATIENT
Start: 2019-06-04 | End: 2019-06-04

## 2019-06-04 RX ORDER — BUTALBITAL, ACETAMINOPHEN AND CAFFEINE 50; 325; 40 MG/1; MG/1; MG/1
1 TABLET ORAL EVERY 4 HOURS PRN
Qty: 30 TAB | Refills: 0 | Status: SHIPPED | OUTPATIENT
Start: 2019-06-04 | End: 2019-06-11

## 2019-06-04 RX ORDER — ARIPIPRAZOLE 10 MG/1
10 TABLET ORAL DAILY
COMMUNITY
End: 2020-07-14

## 2019-06-04 RX ADMIN — PROCHLORPERAZINE EDISYLATE 10 MG: 5 INJECTION INTRAMUSCULAR; INTRAVENOUS at 13:30

## 2019-06-04 RX ADMIN — DIPHENHYDRAMINE HYDROCHLORIDE 12.5 MG: 50 INJECTION INTRAMUSCULAR; INTRAVENOUS at 13:30

## 2019-06-04 NOTE — ED TRIAGE NOTES
"Chief Complaint   Patient presents with   • Headache     Pt reports constant frontal headache x3 days. No hx of migraines. Reports episodes of dizziness and vision changes intermittently. No light or sound sensitivity.      /75   Pulse 67   Temp 36.5 °C (97.7 °F) (Temporal)   Resp 12   Ht 1.651 m (5' 5\")   Wt 107.9 kg (237 lb 14 oz)   SpO2 97%   Breastfeeding? No   BMI 39.58 kg/m²     Pt ambulated into triage, AOx4. VS as above, NAD, encouraged to return to the triage nurse or tech with any new complaints or symptoms.  "

## 2019-06-04 NOTE — ED NOTES
IV d/c'd (by ED tech) cath intact; no redness, no swelling noted; drsg applied.  D/C home with written and verbal instructions re: Rx, activity, f/u.  Verbalizes understanding.  Ambulated out with family

## 2019-06-04 NOTE — ED PROVIDER NOTES
ED Provider Note    Scribed for Moreno Bosch M.D. by Shelley Rodríguez. 6/4/2019  12:51 PM    Primary care provider: Pcp Pt States None  Means of arrival: Walk In  History obtained from: Patient  History limited by: None    CHIEF COMPLAINT  Chief Complaint   Patient presents with   • Headache     Pt reports constant frontal headache x3 days. No hx of migraines. Reports episodes of dizziness and vision changes intermittently. No light or sound sensitivity.        HPI  Alexis Toscano is a 23 y.o. female who presents to the ED for a constant diffuse headache over the past 3 days. She has never had a headache this severe before. She rates the pain at a level 6/10. She has associated nausea, lightheadedness, and tiredness but denies vomiting, photophobia, phonophobia, jaw pain, dental pain, weakness, numbness, facial droop. She gets headaches occasionally when she doesn't wear her glasses at work, but they are always resolved with Tylenol. Her last one was a few months ago. She has tried tylenol, caffeine, food, and water with no improvement. She notes increasing pressure in her head over the past month, which was improved with head massages. She does not have a diagnosis of migraines, and does not have a family history of migraines or brain aneurisms. She does not have a personal history of PE, DVT, leg swelling. She has has calf pain for the last 3-4 months, which she has been told is due to fibromyalgia. She takes Tramadol occasionally for the pain. She has a Mirena IUD.     The patient and her family note that she has had worsening confusion over the past 2 uears. It began during her pregnancy, and she as luis dit was just due to being pregnant. However since giving birther 1 year ago it has significnallty worsneed, and even more so within the past monht. She states she has accidentally thrown away her medications, will forget conversations she had earlier that day, will repeat hrelf ften, and is generally  "forgetful. She notes that her PCP gave her lab work, and 20 minute later she could not remember what she did with it. She smokes marijuana occasionally to help her sleep.       REVIEW OF SYSTEMS  Pertinent positives include: headache, nausea, lightheadedness, and tiredness. Memory loss and confusion.  Pertinent negatives include: vomiting, photophobia, phonophobia, jaw pain, dental pain, weakness, numbness, facial droop. No leg swelling  10+ systems reviewed.       PAST MEDICAL HISTORY  Past Medical History:   Diagnosis Date   • Anesthesia     pt stated her \"Dad woke up during surgery\"   • Anxiety    • Asthma     Inhalers PRN   • Axillary abscess 9/30/2012   • Breath shortness     Not a current problem.   • Cold     off & on 4 months   • Cold     Cold - started 2 days ago 3/26/17.  Congested, mucus, nosed plugged at night and sinus pressure - denies fever.   • Gynecological disorder     Endometriosis.   • Pain     during period   • Pneumonia 2015   • Psychiatric problem     depression, anxiety   • Snoring      FAMILY HISTORY  No aneurysm or migraine    SOCIAL HISTORY  Smokes marijuana occasionally.     CURRENT MEDICATIONS  Home Medications     Reviewed by Gladys Estrada R.N. (Registered Nurse) on 06/04/19 at 1208  Med List Status: Complete   Medication Last Dose Status   albuterol 108 (90 BASE) MCG/ACT Aero Soln inhalation aerosol PRN Active   ARIPiprazole (ABILIFY) 10 MG Tab 6/4/2019 Active   escitalopram (LEXAPRO) 20 MG tablet 6/4/2019 Active   methylphenidate (RITALIN) 5 MG Tab 6/4/2019 Active                ALLERGIES  No Known Allergies    PHYSICAL EXAM  VITAL SIGNS: /75   Pulse 67   Temp 36.5 °C (97.7 °F) (Temporal)   Resp 12   Ht 1.651 m (5' 5\")   Wt 107.9 kg (237 lb 14 oz)   SpO2 97%   Breastfeeding? No   BMI 39.58 kg/m²  Reviewed and high normal blood pressure, afebrile  Constitutional :  Well developed, Well nourished, no acute distress.   HNT: normocephalic, atraumatic  Eyes: pupils " reactive without eye discharge nor conjunctival hyperemia.  Neck: Normal range of motion, No tenderness, Supple, No stridor.   Lymphatic: no lymphadenopathy   Cardiovascular: Regular rhythm, No murmurs, No rubs, No gallops.  No cyanosis.   Respiratory: Lungs are clear to auscultation bilaterally. Breath sounds equal. No wheezes, rales, or rhonchi.  Abdomen:  Soft, nontender  Skin: Warm, dry, no erythema, no rash.   Musculoskeletal: no limb deformities.  Neurologic:Cranial nerves II-XII are intact, Grasp, biceps, extensor hallucis longus, ankle plantar flexion are 5/5 and symmetric, Finger nose finger and fine motor normal. Sensation is intact to light touch in all 4 limbs.  No focal deficits noted.  Mini-Mental status exam normal including a 5-minute short-term memory      RADIOLOGY:  CT-HEAD W/O   Final Result      No acute intracranial abnormality is identified.          INTERVENTIONS:  Medications   prochlorperazine (COMPAZINE) injection 10 mg (10 mg Intravenous Given 6/4/19 1330)   diphenhydrAMINE (BENADRYL) injection 12.5 mg (12.5 mg Intravenous Given 6/4/19 1330)       Response: Good improvement in headache    ED COURSE:  12:51 PM - Patient seen and examined at bedside. Patient will be treated with Compazine 10mg and Benadryl 12.5mg for her symptoms. Ordered CT-Head to evaluate.     2:25 PM - I reevaluated the patient at bedside and updated her on her radiology results. She states she is feeling improved with the medication. I will give her a prescription for Fioricet to take every 4 hours until her headache is resolved. She is to return if it does not resolve or worsens. She is to follow up with a PCP. I discussed the plan for discharge, as outlined below. She understands and agrees.     MEDICAL DECISION MAKING:  Well-appearing patient presents with a 3-day headache and nausea that appears to be a migraine.  She reports a year of worsening forgetfulness but has an unremarkable mental status exam here.  CT  head obtained due to worsening forgetfulness normal demonstrating no obvious etiology for forgetfulness or headache    DISPOSITION: Home    PLAN:  New Prescriptions    ACETAMINOPHEN/CAFFEINE/BUTALBITAL 325-40-50 MG (FIORICET) -40 MG TAB    Take 1 Tab by mouth every four hours as needed for Headache for up to 7 days.     Ibuprofen at the onset of headache  Migraine handout given  Return for worsening or persistent headache.    [address] [address]  [phone]  Schedule an appointment as soon as possible for a visit   As needed if not better 2-3 dye      CONDITION:  Good.    FINAL IMPRESSION:  1. Migraine without aura and without status migrainosus, not intractable         I, Shelley Rodríguez (Scribe), am scribing for, and in the presence of, Moreno Bosch M.D..    Electronically signed by: Shelley Rodríguez (Scribe), 6/4/2019    Electronically signed by: Shelley Rodríguez, 6/4/2019    The note accurately reflects work and decisions made by me.  Moreno Bosch  6/4/2019  6:18 PM

## 2019-06-04 NOTE — ED NOTES
"Ambulatory to 65 with same report as triage note.  Reports dizziness as light headedness and constant, visual disturbance of \"shadow red out of my left Periferal, like a red spotlight and occasional small white spots all over.  Denies fever, denies vomit or diarrhea.  Reports nausea.  Denies recent trauma or injury to head.  PERRLA  "

## 2019-06-04 NOTE — DISCHARGE INSTRUCTIONS
Migraine Headache    Take ibuprofen and Fioricet immediately at the first hint of headache.  Return for sudden onset, severe headache, headache and fever or headache and weakness.    You had a borderline or high normal blood pressure reading today.  This does not necessarily mean you have hypertension.  Please followup with your/a primary physician for comprehensive blood pressure evaluation and yearly fasting cholesterol assessment.  BP Readings from Last 3 Encounters:   06/04/19 122/75   07/04/18 110/80   06/21/18 124/74         You have a migraine headache. Symptoms of migraines include a throbbing headache, made worse by activity. Sometimes only one side of the head hurts. Nausea, vomiting and sinus pain or stuffiness is common with migraines. Visual symptoms such as light sensitivity, blind spots, or flashing lights may also occur. Loud noises may make migraine pain worse. Migraine headaches are caused by changes in the size of the blood vessels in the head and neck. Many factors may cause this problem including:  Emotional stress, lack of sleep, and menstrual periods.  Alcohol and some drugs (such as birth control pills).  Diet factors (fasting, caffeine, food preservatives, chocolate).  Environmental factors (weather changes, bright lights, odors, smoke).  Jarring motions and loud noises may also bring on a migraine. Prevention of migraines includes dealing with the trigger factors.    Treatment may require medicines for pain, inflammation, and vomiting. Injections for pain and IV fluids can be used if the headache is very severe, or if you are vomiting repeatedly. Get plenty of rest over the next 24 hours.  Lie down in a quiet, dark place and try to sleep  Medicines to prevent the blood vessel changes may be prescribed.  For people with frequent migraines, other medicines such as beta blockers and antidepressants may be helpful. Please see your caregiver if you are not better in 1-2 days.     SEEK IMMEDIATE  MEDICAL CARE IF:  You develop a high fever, repeated vomiting, dehydration, or extreme weakness  You develop fainting, worsening headache, confusion, or seizures  You develop numbness or weakness of the limbs    [employer]® Patient Information ©2007 [employer].

## 2019-09-02 ENCOUNTER — TELEPHONE (OUTPATIENT)
Dept: SCHEDULING | Facility: IMAGING CENTER | Age: 24
End: 2019-09-02

## 2020-02-13 ENCOUNTER — APPOINTMENT (OUTPATIENT)
Dept: RADIOLOGY | Facility: MEDICAL CENTER | Age: 25
End: 2020-02-13
Attending: COLON & RECTAL SURGERY
Payer: COMMERCIAL

## 2020-03-12 ENCOUNTER — OFFICE VISIT (OUTPATIENT)
Dept: URGENT CARE | Facility: PHYSICIAN GROUP | Age: 25
End: 2020-03-12
Payer: COMMERCIAL

## 2020-03-12 VITALS
HEIGHT: 66 IN | SYSTOLIC BLOOD PRESSURE: 128 MMHG | HEART RATE: 95 BPM | WEIGHT: 260 LBS | BODY MASS INDEX: 41.78 KG/M2 | TEMPERATURE: 98 F | RESPIRATION RATE: 15 BRPM | OXYGEN SATURATION: 100 % | DIASTOLIC BLOOD PRESSURE: 80 MMHG

## 2020-03-12 DIAGNOSIS — Z34.90 PREGNANCY, UNSPECIFIED GESTATIONAL AGE: Primary | ICD-10-CM

## 2020-03-12 LAB
APPEARANCE UR: NORMAL
BILIRUB UR STRIP-MCNC: NEGATIVE MG/DL
COLOR UR AUTO: YELLOW
GLUCOSE UR STRIP.AUTO-MCNC: NEGATIVE MG/DL
INT CON NEG: NEGATIVE
INT CON POS: POSITIVE
KETONES UR STRIP.AUTO-MCNC: 15 MG/DL
LEUKOCYTE ESTERASE UR QL STRIP.AUTO: NEGATIVE
NITRITE UR QL STRIP.AUTO: NEGATIVE
PH UR STRIP.AUTO: 5.5 [PH] (ref 5–8)
POC URINE PREGNANCY TEST: POSITIVE
PROT UR QL STRIP: NEGATIVE MG/DL
RBC UR QL AUTO: NORMAL
SP GR UR STRIP.AUTO: 1.02
UROBILINOGEN UR STRIP-MCNC: 0.2 MG/DL

## 2020-03-12 PROCEDURE — 99214 OFFICE O/P EST MOD 30 MIN: CPT | Performed by: PHYSICIAN ASSISTANT

## 2020-03-12 PROCEDURE — 81025 URINE PREGNANCY TEST: CPT | Mod: QW | Performed by: PHYSICIAN ASSISTANT

## 2020-03-12 PROCEDURE — 81002 URINALYSIS NONAUTO W/O SCOPE: CPT | Mod: QW | Performed by: PHYSICIAN ASSISTANT

## 2020-03-12 RX ORDER — NORTRIPTYLINE HYDROCHLORIDE 10 MG/1
CAPSULE ORAL
COMMUNITY
Start: 2020-01-30 | End: 2020-07-14

## 2020-03-12 RX ORDER — DICYCLOMINE HYDROCHLORIDE 10 MG/1
CAPSULE ORAL
COMMUNITY
Start: 2020-01-18 | End: 2020-07-14

## 2020-03-12 RX ORDER — LISDEXAMFETAMINE DIMESYLATE 50 MG
50 CAPSULE ORAL
COMMUNITY
Start: 2020-01-18 | End: 2020-07-14

## 2020-03-12 ASSESSMENT — ENCOUNTER SYMPTOMS
VOMITING: 0
DIAPHORESIS: 0
DIARRHEA: 0
NAUSEA: 0
FEVER: 0
WEAKNESS: 0
HEARTBURN: 0
COUGH: 0
BLOOD IN STOOL: 0
WEIGHT LOSS: 0
CONSTIPATION: 0
CHILLS: 0
SHORTNESS OF BREATH: 0
DIZZINESS: 0
PALPITATIONS: 0
ABDOMINAL PAIN: 0
HEADACHES: 0

## 2020-03-12 NOTE — PROGRESS NOTES
"Subjective:      Alexis Toscano is a 24 y.o. female who presents with Pregnancy Test (3 positives at home, requesting comformation)            Patient is a 24-year-old female who presents for evaluation of possible pregnancy.  She has not had had her menstruation in 3 months.  She denies vomiting, abdominal pain or vaginal bleeding.  She took 3 pregnancy tests at home which were all positive.      Review of Systems   Constitutional: Negative for chills, diaphoresis, fever, malaise/fatigue and weight loss.   Respiratory: Negative for cough and shortness of breath.    Cardiovascular: Negative for chest pain and palpitations.   Gastrointestinal: Negative for abdominal pain, blood in stool, constipation, diarrhea, heartburn, melena, nausea and vomiting.   Skin: Negative for itching and rash.   Neurological: Negative for dizziness, weakness and headaches.   All other systems reviewed and are negative.         Objective:     Blood Pressure 128/80   Pulse 95   Temperature 36.7 °C (98 °F)   Respiration 15   Height 1.676 m (5' 6\")   Weight 117.9 kg (260 lb)   Oxygen Saturation 100%   Body Mass Index 41.97 kg/m²      Physical Exam  Vitals signs reviewed.   Constitutional:       General: She is not in acute distress.     Appearance: Normal appearance. She is well-developed. She is not ill-appearing, toxic-appearing or diaphoretic.   HENT:      Head: Normocephalic and atraumatic.      Right Ear: External ear normal.      Left Ear: External ear normal.      Nose: Nose normal.   Eyes:      General: Lids are normal.      Conjunctiva/sclera: Conjunctivae normal.   Neck:      Musculoskeletal: Full passive range of motion without pain, normal range of motion and neck supple.   Cardiovascular:      Rate and Rhythm: Normal rate and regular rhythm.      Heart sounds: Normal heart sounds, S1 normal and S2 normal. No murmur. No friction rub. No gallop.    Pulmonary:      Effort: Pulmonary effort is normal. No respiratory " distress.      Breath sounds: Normal breath sounds. No decreased breath sounds, wheezing or rales.   Chest:      Chest wall: No tenderness.   Abdominal:      General: Bowel sounds are normal. There is no distension or abdominal bruit.      Palpations: Abdomen is soft. Abdomen is not rigid. There is no shifting dullness, fluid wave, mass or pulsatile mass.      Tenderness: There is no abdominal tenderness. There is no guarding or rebound. Negative signs include Stover's sign and McBurney's sign.      Hernia: No hernia is present.      Comments: Abdomen:   Soft and nondistended. Normal bowel sounds. No hepatosplenomegaly or masses, or hernias. No rebound or guarding.     Musculoskeletal: Normal range of motion.         General: No tenderness or deformity.   Skin:     General: Skin is warm and dry.      Findings: No bruising, ecchymosis or erythema.   Neurological:      Mental Status: She is alert and oriented to person, place, and time.   Psychiatric:         Speech: Speech normal.         Behavior: Behavior normal.         Thought Content: Thought content normal.         Judgment: Judgment normal.              PREG: POS  Assessment/Plan:       1. Pregnancy, unspecified gestational age  - No vaginal bleeding/abdominal pain/vomitting  - Pt will follow up with OBGYN    Differential diagnosis, natural history, supportive care discussed. Follow-up with primary care provider within 7-10 days, emergency room precautions discussed.  Patient and/or family appears understanding of information.  Handout and review of patients diagnosis and treatment was discussed extensively.

## 2020-03-13 ENCOUNTER — HOSPITAL ENCOUNTER (OUTPATIENT)
Dept: LAB | Facility: MEDICAL CENTER | Age: 25
End: 2020-03-13
Attending: OBSTETRICS & GYNECOLOGY
Payer: COMMERCIAL

## 2020-03-13 ENCOUNTER — GYNECOLOGY VISIT (OUTPATIENT)
Dept: OBGYN | Facility: CLINIC | Age: 25
End: 2020-03-13
Payer: COMMERCIAL

## 2020-03-13 VITALS
SYSTOLIC BLOOD PRESSURE: 125 MMHG | DIASTOLIC BLOOD PRESSURE: 65 MMHG | WEIGHT: 267 LBS | BODY MASS INDEX: 43.09 KG/M2 | HEART RATE: 73 BPM

## 2020-03-13 DIAGNOSIS — N91.1 AMENORRHEA, SECONDARY: ICD-10-CM

## 2020-03-13 DIAGNOSIS — Z32.01 POSITIVE PREGNANCY TEST: ICD-10-CM

## 2020-03-13 PROCEDURE — 36415 COLL VENOUS BLD VENIPUNCTURE: CPT

## 2020-03-13 PROCEDURE — 99203 OFFICE O/P NEW LOW 30 MIN: CPT | Mod: 25 | Performed by: OBSTETRICS & GYNECOLOGY

## 2020-03-13 PROCEDURE — 84702 CHORIONIC GONADOTROPIN TEST: CPT

## 2020-03-13 PROCEDURE — 76830 TRANSVAGINAL US NON-OB: CPT | Performed by: OBSTETRICS & GYNECOLOGY

## 2020-03-13 NOTE — PROGRESS NOTES
Subjective:      Alexis Toscano is a 24 y.o. female who presents as New Patient (DUB)            HPI patient is a 25-year-old G2, P1 who presents today with amenorrhea and positive home pregnancy test.  Patient was not using any contraception.  She is doing well except for complaints of nausea which started 3 days ago.  She denies any pelvic or abdominal pain.  She denies any bleeding or spotting.  She has not started prenatal vitamin but states she will start prenatal vitamins today.  She reports history of irregular menstruation/oligomenorrhea    Patient reports history of one spontaneous vaginal delivery 2 years ago which was without any complications.    ROS all organ systems were reviewed and were negative except for complaints in HPI       Objective:     /65   Pulse 73   Wt 121.1 kg (267 lb)   LMP 01/08/2020 (Approximate)   BMI 43.09 kg/m²      Physical Exam  Vitals signs and nursing note reviewed. Exam conducted with a chaperone present.   Constitutional:       General: She is not in acute distress.     Appearance: Normal appearance. She is obese. She is not toxic-appearing.   HENT:      Head: Normocephalic and atraumatic.      Nose: No congestion or rhinorrhea.   Eyes:      General:         Right eye: No discharge.         Left eye: No discharge.      Conjunctiva/sclera: Conjunctivae normal.   Neck:      Musculoskeletal: Normal range of motion and neck supple. No neck rigidity.   Cardiovascular:      Rate and Rhythm: Normal rate and regular rhythm.      Pulses: Normal pulses.      Heart sounds: Normal heart sounds.   Pulmonary:      Effort: Pulmonary effort is normal. No respiratory distress.      Breath sounds: Normal breath sounds. No wheezing.   Abdominal:      General: Abdomen is flat. Bowel sounds are normal. There is no distension.      Palpations: Abdomen is soft.      Tenderness: There is no abdominal tenderness. There is no guarding.   Genitourinary:     General: Normal vulva.       Vagina: No vaginal discharge.   Lymphadenopathy:      Cervical: No cervical adenopathy.   Neurological:      General: No focal deficit present.      Mental Status: She is alert and oriented to person, place, and time.      Gait: Gait normal.   Psychiatric:         Mood and Affect: Mood normal.         Behavior: Behavior normal.         Thought Content: Thought content normal.         Judgment: Judgment normal.            Transvaginal ultrasound was performed and read by me:    Intrauterine gestational sac with no yolk sac or fetal pole noted  Gestational sac diameter was 7 mm corresponding to approximately 4 weeks gestation  Both ovaries were visualized without any masses  No pelvic free fluid noted    Impression: Early intrauterine gestational sac without any yolk sac or fetal pole     Assessment/Plan:       1. Amenorrhea, secondary  Patient presenting with amenorrhea and positive home pregnancy test.  Small intrauterine gestational sac noted on transvaginal ultrasound with no fetal pole or yolk sac.  We discussed findings that this could represent an early pregnancy versus a missed AB/embryonic demise.  Since patient symptoms started recently is likely an early intrauterine gestational sac.  We discussed that ectopic pregnancy is not ruled out an ectopic and bleeding precautions were discussed.  We will check serial beta-hCG levels and patient will follow-up in 2 weeks for repeat ultrasound  - HCG QUANTITATIVE; Future  - HCG QUANTITATIVE; Future    2. Positive pregnancy test    - HCG QUANTITATIVE; Future  - HCG QUANTITATIVE; Future    3.  Pregnancy precautions and restrictions were reviewed.  Patient advised to start prenatal vitamin and folic acid    4.  Nausea in pregnancy discussed.  Discussed treatment including dietary modification and vitamin B6 options.

## 2020-03-15 LAB — B-HCG SERPL-ACNC: 3180.5 MIU/ML (ref 0–5)

## 2020-03-19 ENCOUNTER — OFFICE VISIT (OUTPATIENT)
Dept: URGENT CARE | Facility: CLINIC | Age: 25
End: 2020-03-19
Payer: COMMERCIAL

## 2020-03-19 ENCOUNTER — APPOINTMENT (OUTPATIENT)
Dept: URGENT CARE | Facility: CLINIC | Age: 25
End: 2020-03-19
Payer: COMMERCIAL

## 2020-03-19 ENCOUNTER — HOSPITAL ENCOUNTER (OUTPATIENT)
Facility: MEDICAL CENTER | Age: 25
End: 2020-03-19
Attending: PHYSICIAN ASSISTANT
Payer: COMMERCIAL

## 2020-03-19 VITALS
SYSTOLIC BLOOD PRESSURE: 124 MMHG | DIASTOLIC BLOOD PRESSURE: 82 MMHG | BODY MASS INDEX: 42.43 KG/M2 | WEIGHT: 264 LBS | HEIGHT: 66 IN | RESPIRATION RATE: 16 BRPM | OXYGEN SATURATION: 99 % | HEART RATE: 120 BPM | TEMPERATURE: 97.2 F

## 2020-03-19 DIAGNOSIS — J02.9 PHARYNGITIS, UNSPECIFIED ETIOLOGY: ICD-10-CM

## 2020-03-19 LAB
FLUAV+FLUBV AG SPEC QL IA: NEGATIVE
INT CON NEG: NORMAL
INT CON NEG: NORMAL
INT CON POS: NORMAL
INT CON POS: NORMAL
S PYO AG THROAT QL: NEGATIVE

## 2020-03-19 PROCEDURE — 99213 OFFICE O/P EST LOW 20 MIN: CPT | Performed by: PHYSICIAN ASSISTANT

## 2020-03-19 PROCEDURE — 87804 INFLUENZA ASSAY W/OPTIC: CPT | Performed by: PHYSICIAN ASSISTANT

## 2020-03-19 PROCEDURE — 87880 STREP A ASSAY W/OPTIC: CPT | Performed by: PHYSICIAN ASSISTANT

## 2020-03-19 PROCEDURE — 87070 CULTURE OTHR SPECIMN AEROBIC: CPT

## 2020-03-19 ASSESSMENT — ENCOUNTER SYMPTOMS
NAUSEA: 1
SHORTNESS OF BREATH: 0
SORE THROAT: 1
ABDOMINAL PAIN: 0
FEVER: 0
CHILLS: 0
CONSTITUTIONAL NEGATIVE: 1
COUGH: 1
VOMITING: 0
CONSTIPATION: 0
DIARRHEA: 0
FLANK PAIN: 0
SINUS PAIN: 0

## 2020-03-19 NOTE — PROGRESS NOTES
Subjective:   Alexis Toscano is a 24 y.o. female who presents today with   Chief Complaint   Patient presents with   • Pharyngitis     x 3 days, sore throat, pain to swallow, chills and bodyaches       Pharyngitis    This is a new problem. Episode onset: 3 days. The problem has been unchanged. There has been no fever. The pain is mild. Associated symptoms include coughing and ear pain. Pertinent negatives include no abdominal pain, congestion, diarrhea, shortness of breath or vomiting. Associated symptoms comments: myalgias. She has had exposure to strep. She has tried nothing for the symptoms. The treatment provided no relief.   Patient is 5 weeks pregnant    PMH:  has a past medical history of Anesthesia, Anxiety, Asthma, Axillary abscess (9/30/2012), Breath shortness, Cold, Cold, Gynecological disorder, Pain, Pneumonia (2015), Psychiatric problem, and Snoring.  MEDS:   Current Outpatient Medications:   •  VYVANSE 50 MG capsule, Take 50 mg by mouth every day., Disp: , Rfl:   •  nortriptyline (PAMELOR) 10 MG Cap, TAKE 1 CAPSULE BY MOUTH EVERY DAY AT NIGHT, Disp: , Rfl:   •  dicyclomine (BENTYL) 10 MG Cap, TAKE 1 CAPSULE BY MOUTH FOUR TIMES A DAY AS NEEDED, Disp: , Rfl:   •  escitalopram (LEXAPRO) 20 MG tablet, Take 20 mg by mouth every day., Disp: , Rfl:   •  ARIPiprazole (ABILIFY) 10 MG Tab, Take 10 mg by mouth every day., Disp: , Rfl:   •  methylphenidate (RITALIN) 5 MG Tab, Take 5 mg by mouth 2 times a day., Disp: , Rfl:   •  albuterol 108 (90 BASE) MCG/ACT Aero Soln inhalation aerosol, Inhale 2 Puffs by mouth every 6 hours as needed for Shortness of Breath. (Patient not taking: Reported on 3/19/2020), Disp: 8.5 g, Rfl: 1  ALLERGIES: No Known Allergies  SURGHX:   Past Surgical History:   Procedure Laterality Date   • TONSILLECTOMY Bilateral 3/31/2017    Procedure: TONSILLECTOMY;  Surgeon: Ilya Escalona M.D.;  Location: SURGERY SAME DAY [facility];  Service:    • PELVISCOPY N/A 2/14/2017     "Procedure: PELVISCOPY;  Surgeon: Sweta Yanez M.D.;  Location: SURGERY [facility];  Service:    • APPENDECTOMY  2008     SOCHX:  reports that she quit smoking about 2 years ago. Her smoking use included cigarettes. She has a 1.50 pack-year smoking history. She has never used smokeless tobacco. She reports current drug use. She reports that she does not drink alcohol.  FH: Reviewed with patient, not pertinent to this visit.       Review of Systems   Constitutional: Negative.  Negative for chills and fever.   HENT: Positive for ear pain and sore throat. Negative for congestion and sinus pain.    Respiratory: Positive for cough. Negative for shortness of breath.    Gastrointestinal: Positive for nausea. Negative for abdominal pain, constipation, diarrhea and vomiting.   Genitourinary: Negative for dysuria, flank pain, frequency, hematuria and urgency.   All other systems reviewed and are negative.       Objective:   /82 (BP Location: Left arm, Patient Position: Sitting, BP Cuff Size: Large adult)   Pulse (!) 120   Temp 36.2 °C (97.2 °F) (Temporal)   Resp 16   Ht 1.676 m (5' 6\")   Wt 119.7 kg (264 lb)   SpO2 99%   BMI 42.61 kg/m²   Physical Exam  Vitals signs and nursing note reviewed.   Constitutional:       General: She is not in acute distress.     Appearance: Normal appearance. She is well-developed and normal weight. She is not ill-appearing or toxic-appearing.   HENT:      Head: Normocephalic and atraumatic.      Right Ear: Hearing, tympanic membrane and ear canal normal.      Left Ear: Hearing and ear canal normal. A middle ear effusion is present.      Mouth/Throat:      Pharynx: Posterior oropharyngeal erythema present.   Eyes:      Pupils: Pupils are equal, round, and reactive to light.   Cardiovascular:      Rate and Rhythm: Normal rate and regular rhythm.      Heart sounds: Normal heart sounds.   Pulmonary:      Effort: Pulmonary effort is normal. No respiratory distress.      Breath " sounds: Normal breath sounds. No stridor. No wheezing, rhonchi or rales.   Chest:      Chest wall: No tenderness.   Musculoskeletal:      Comments: Normal movement in all 4 extremities   Lymphadenopathy:      Head:      Right side of head: Submandibular and tonsillar adenopathy present.      Left side of head: Submandibular and tonsillar adenopathy present.   Skin:     General: Skin is warm and dry.   Neurological:      Mental Status: She is alert.      Coordination: Coordination normal.   Psychiatric:         Mood and Affect: Mood normal.     STREP A NEG  FLU NEG  Assessment/Plan:   Assessment    1. Pharyngitis, unspecified etiology  - POCT Rapid Strep A  - POCT Influenza A/B  - CULTURE THROAT; Future  Encourage fluids, rest and other over-the-counter remedies safe during pregnancy including lozenges.  Given negative testing done today we will do a throat culture as her son tested positive for strep today.  Will await antibiotics until throat culture comes back.  Patient is agreeable to this plan.  Differential diagnosis, natural history, supportive care, and indications for immediate follow-up discussed.   Patient given instructions and understanding of medications and treatment.    If not improving in 3-5 days, F/U with PCP or return to  if symptoms worsen.    Patient agreeable to plan.      Please note that this dictation was created using voice recognition software. I have made every reasonable attempt to correct obvious errors, but I expect that there are errors of grammar and possibly content that I did not discover before finalizing the note.    Hussain Hayes PA-C

## 2020-03-21 LAB
BACTERIA SPEC RESP CULT: NORMAL
SIGNIFICANT IND 70042: NORMAL
SITE SITE: NORMAL
SOURCE SOURCE: NORMAL

## 2020-04-11 ENCOUNTER — HOSPITAL ENCOUNTER (OUTPATIENT)
Dept: LAB | Facility: MEDICAL CENTER | Age: 25
End: 2020-04-11
Attending: OBSTETRICS & GYNECOLOGY
Payer: COMMERCIAL

## 2020-04-11 LAB
ABO GROUP BLD: NORMAL
APPEARANCE UR: ABNORMAL
BACTERIA #/AREA URNS HPF: ABNORMAL /HPF
BASOPHILS # BLD AUTO: 0.7 % (ref 0–1.8)
BASOPHILS # BLD: 0.05 K/UL (ref 0–0.12)
BILIRUB UR QL STRIP.AUTO: NEGATIVE
BLD GP AB SCN SERPL QL: NORMAL
COLOR UR: YELLOW
EOSINOPHIL # BLD AUTO: 0.18 K/UL (ref 0–0.51)
EOSINOPHIL NFR BLD: 2.5 % (ref 0–6.9)
EPI CELLS #/AREA URNS HPF: ABNORMAL /HPF
ERYTHROCYTE [DISTWIDTH] IN BLOOD BY AUTOMATED COUNT: 39.2 FL (ref 35.9–50)
GLUCOSE UR STRIP.AUTO-MCNC: NEGATIVE MG/DL
HBV SURFACE AG SER QL: ABNORMAL
HCT VFR BLD AUTO: 38.9 % (ref 37–47)
HCV AB SER QL: NORMAL
HGB BLD-MCNC: 13.6 G/DL (ref 12–16)
HIV 1+2 AB+HIV1 P24 AG SERPL QL IA: NORMAL
HYALINE CASTS #/AREA URNS LPF: ABNORMAL /LPF
IMM GRANULOCYTES # BLD AUTO: 0.01 K/UL (ref 0–0.11)
IMM GRANULOCYTES NFR BLD AUTO: 0.1 % (ref 0–0.9)
KETONES UR STRIP.AUTO-MCNC: NEGATIVE MG/DL
LEUKOCYTE ESTERASE UR QL STRIP.AUTO: ABNORMAL
LYMPHOCYTES # BLD AUTO: 2.01 K/UL (ref 1–4.8)
LYMPHOCYTES NFR BLD: 27.5 % (ref 22–41)
MCH RBC QN AUTO: 31.7 PG (ref 27–33)
MCHC RBC AUTO-ENTMCNC: 35 G/DL (ref 33.6–35)
MCV RBC AUTO: 90.7 FL (ref 81.4–97.8)
MICRO URNS: ABNORMAL
MONOCYTES # BLD AUTO: 0.4 K/UL (ref 0–0.85)
MONOCYTES NFR BLD AUTO: 5.5 % (ref 0–13.4)
NEUTROPHILS # BLD AUTO: 4.67 K/UL (ref 2–7.15)
NEUTROPHILS NFR BLD: 63.7 % (ref 44–72)
NITRITE UR QL STRIP.AUTO: NEGATIVE
NRBC # BLD AUTO: 0 K/UL
NRBC BLD-RTO: 0 /100 WBC
PH UR STRIP.AUTO: 6.5 [PH] (ref 5–8)
PLATELET # BLD AUTO: 330 K/UL (ref 164–446)
PMV BLD AUTO: 9.3 FL (ref 9–12.9)
PROT UR QL STRIP: NEGATIVE MG/DL
RBC # BLD AUTO: 4.29 M/UL (ref 4.2–5.4)
RBC # URNS HPF: ABNORMAL /HPF
RBC UR QL AUTO: ABNORMAL
RH BLD: NORMAL
RUBV AB SER QL: 141 IU/ML
SP GR UR STRIP.AUTO: 1.02
TREPONEMA PALLIDUM IGG+IGM AB [PRESENCE] IN SERUM OR PLASMA BY IMMUNOASSAY: ABNORMAL
UROBILINOGEN UR STRIP.AUTO-MCNC: 0.2 MG/DL
WBC # BLD AUTO: 7.3 K/UL (ref 4.8–10.8)
WBC #/AREA URNS HPF: ABNORMAL /HPF

## 2020-04-11 PROCEDURE — 86803 HEPATITIS C AB TEST: CPT

## 2020-04-11 PROCEDURE — 87340 HEPATITIS B SURFACE AG IA: CPT

## 2020-04-11 PROCEDURE — 85025 COMPLETE CBC W/AUTO DIFF WBC: CPT

## 2020-04-11 PROCEDURE — 36415 COLL VENOUS BLD VENIPUNCTURE: CPT

## 2020-04-11 PROCEDURE — 86901 BLOOD TYPING SEROLOGIC RH(D): CPT

## 2020-04-11 PROCEDURE — 86900 BLOOD TYPING SEROLOGIC ABO: CPT

## 2020-04-11 PROCEDURE — 86780 TREPONEMA PALLIDUM: CPT

## 2020-04-11 PROCEDURE — 87389 HIV-1 AG W/HIV-1&-2 AB AG IA: CPT

## 2020-04-11 PROCEDURE — 86762 RUBELLA ANTIBODY: CPT

## 2020-04-11 PROCEDURE — 81001 URINALYSIS AUTO W/SCOPE: CPT

## 2020-04-11 PROCEDURE — 86850 RBC ANTIBODY SCREEN: CPT

## 2020-05-11 ENCOUNTER — HOSPITAL ENCOUNTER (OUTPATIENT)
Dept: LAB | Facility: MEDICAL CENTER | Age: 25
End: 2020-05-11
Attending: OBSTETRICS & GYNECOLOGY
Payer: COMMERCIAL

## 2020-05-11 LAB — AMBIGUOUS DTTM AMBI4: NORMAL

## 2020-05-11 PROCEDURE — 87624 HPV HI-RISK TYP POOLED RSLT: CPT

## 2020-05-11 PROCEDURE — 87591 N.GONORRHOEAE DNA AMP PROB: CPT

## 2020-05-11 PROCEDURE — 87491 CHLMYD TRACH DNA AMP PROBE: CPT

## 2020-05-11 PROCEDURE — 88175 CYTOPATH C/V AUTO FLUID REDO: CPT

## 2020-05-15 LAB
C TRACH DNA GENITAL QL NAA+PROBE: NEGATIVE
CYTOLOGY REG CYTOL: NORMAL
N GONORRHOEA DNA GENITAL QL NAA+PROBE: NEGATIVE
SPECIMEN SOURCE: NORMAL

## 2020-05-16 LAB
HPV HR 12 DNA CVX QL NAA+PROBE: NEGATIVE
HPV16 DNA SPEC QL NAA+PROBE: NEGATIVE
HPV18 DNA SPEC QL NAA+PROBE: NEGATIVE
SPECIMEN SOURCE: NORMAL

## 2020-07-14 ENCOUNTER — HOSPITAL ENCOUNTER (EMERGENCY)
Facility: MEDICAL CENTER | Age: 25
End: 2020-07-14
Attending: EMERGENCY MEDICINE
Payer: COMMERCIAL

## 2020-07-14 VITALS
TEMPERATURE: 97.5 F | DIASTOLIC BLOOD PRESSURE: 63 MMHG | BODY MASS INDEX: 40.21 KG/M2 | HEART RATE: 92 BPM | WEIGHT: 250.22 LBS | SYSTOLIC BLOOD PRESSURE: 127 MMHG | OXYGEN SATURATION: 96 % | RESPIRATION RATE: 18 BRPM | HEIGHT: 66 IN

## 2020-07-14 DIAGNOSIS — L02.91 ABSCESS: ICD-10-CM

## 2020-07-14 LAB
GLUCOSE BLD-MCNC: 83 MG/DL (ref 65–99)
GRAM STN SPEC: NORMAL
SIGNIFICANT IND 70042: NORMAL
SITE SITE: NORMAL
SOURCE SOURCE: NORMAL

## 2020-07-14 PROCEDURE — 87070 CULTURE OTHR SPECIMN AEROBIC: CPT

## 2020-07-14 PROCEDURE — 87205 SMEAR GRAM STAIN: CPT

## 2020-07-14 PROCEDURE — 99283 EMERGENCY DEPT VISIT LOW MDM: CPT

## 2020-07-14 PROCEDURE — 82962 GLUCOSE BLOOD TEST: CPT

## 2020-07-14 PROCEDURE — 303977 HCHG I & D

## 2020-07-14 PROCEDURE — 700111 HCHG RX REV CODE 636 W/ 250 OVERRIDE (IP): Performed by: EMERGENCY MEDICINE

## 2020-07-14 RX ORDER — CEPHALEXIN 500 MG/1
500 CAPSULE ORAL 2 TIMES DAILY
Qty: 14 CAP | Refills: 0 | Status: SHIPPED | OUTPATIENT
Start: 2020-07-14 | End: 2020-07-21

## 2020-07-14 RX ORDER — BUPIVACAINE HYDROCHLORIDE 2.5 MG/ML
10 INJECTION, SOLUTION EPIDURAL; INFILTRATION; INTRACAUDAL ONCE
Status: COMPLETED | OUTPATIENT
Start: 2020-07-14 | End: 2020-07-14

## 2020-07-14 RX ADMIN — BUPIVACAINE HYDROCHLORIDE 10 ML: 2.5 INJECTION, SOLUTION EPIDURAL; INFILTRATION; INTRACAUDAL; PERINEURAL at 08:15

## 2020-07-14 NOTE — ED NOTES
Pt. Provided with AVS paperwork and teaching, pt. Given printed antibiotic prescription. Pt. Denies questions or concerns at time of discharge. Pt. Ambulated out of ED independently.

## 2020-07-14 NOTE — ED PROVIDER NOTES
"ED Provider Note    CHIEF COMPLAINT  Chief Complaint   Patient presents with   • Wound Check       HPI  Alexis Toscano is a 24 y.o. female who presents for evaluation of 2 to 3 days of right thigh redness pain and swelling with some fluctuance.  The patient has a history of hidradenitis upper tiva.  She generally gets lesions in her axilla.  She reports several days of skin irritation in her groin and she thinks she is developed small abscesses.  She denies diabetes.  She is 23 weeks pregnant.  There is been apparently no complications with her pregnancy such as diabetes hypertension  labor etc.  She denies high fevers or chills.  No known COVID-19 exposures    REVIEW OF SYSTEMS  See HPI for further details.  No night sweats weight loss numbness tingling weakness rash all other systems are negative.     PAST MEDICAL HISTORY  Past Medical History:   Diagnosis Date   • Pneumonia    • Axillary abscess 2012   • Anesthesia     pt stated her \"Dad woke up during surgery\"   • Anxiety    • Asthma     Inhalers PRN   • Breath shortness     Not a current problem.   • Cold     off & on 4 months   • Cold     Cold - started 2 days ago 3/26/17.  Congested, mucus, nosed plugged at night and sinus pressure - denies fever.   • Gynecological disorder     Endometriosis.   • Pain     during period   • Psychiatric problem     depression, anxiety   • Snoring        FAMILY HISTORY  Noncontributory    SOCIAL HISTORY  Social History     Socioeconomic History   • Marital status:      Spouse name: Not on file   • Number of children: Not on file   • Years of education: Not on file   • Highest education level: Not on file   Occupational History   • Not on file   Social Needs   • Financial resource strain: Not on file   • Food insecurity     Worry: Not on file     Inability: Not on file   • Transportation needs     Medical: Not on file     Non-medical: Not on file   Tobacco Use   • Smoking status: Former Smoker     " Packs/day: 0.25     Years: 6.00     Pack years: 1.50     Types: Cigarettes     Last attempt to quit: 10/30/2017     Years since quittin.7   • Smokeless tobacco: Never Used   • Tobacco comment: quit 1 week ago   Substance and Sexual Activity   • Alcohol use: No   • Drug use: Not Currently     Comment: marijuana.  Most days for endometriosis.   • Sexual activity: Yes     Partners: Male     Birth control/protection: Condom, Pill   Lifestyle   • Physical activity     Days per week: Not on file     Minutes per session: Not on file   • Stress: Not on file   Relationships   • Social connections     Talks on phone: Not on file     Gets together: Not on file     Attends Yazidism service: Not on file     Active member of club or organization: Not on file     Attends meetings of clubs or organizations: Not on file     Relationship status: Not on file   • Intimate partner violence     Fear of current or ex partner: Not on file     Emotionally abused: Not on file     Physically abused: Not on file     Forced sexual activity: Not on file   Other Topics Concern   • Behavioral problems Not Asked   • Interpersonal relationships Not Asked   • Sad or not enjoying activities Not Asked   • Suicidal thoughts Not Asked   • Poor school performance Not Asked   • Reading difficulties Not Asked   • Speech difficulties Not Asked   • Writing difficulties Not Asked   • Inadequate sleep Not Asked   • Excessive TV viewing Not Asked   • Excessive video game use Not Asked   • Inadequate exercise Not Asked   • Sports related Not Asked   • Poor diet Not Asked   • Family concerns for drug/alcohol abuse Not Asked   • Poor oral hygiene Not Asked   • Bike safety Not Asked   • Family concerns vehicle safety Not Asked   Social History Narrative   • Not on file       SURGICAL HISTORY  Past Surgical History:   Procedure Laterality Date   • TONSILLECTOMY Bilateral 3/31/2017    Procedure: TONSILLECTOMY;  Surgeon: Ilya Escalona M.D.;  Location: SURGERY  [facility]SAME DAY [facility];  Service:    • PELVISCOPY N/A 2/14/2017    Procedure: PELVISCOPY;  Surgeon: Sweta Yanez M.D.;  Location: SURGERY [facility];  Service:    • APPENDECTOMY  2008       CURRENT MEDICATIONS  Home Medications     Reviewed by Tessa Arvizu R.N. (Registered Nurse) on 07/14/20 at 0733  Med List Status: Complete   Medication Last Dose Status   albuterol 108 (90 BASE) MCG/ACT Aero Soln inhalation aerosol PRN Active   Prenatal MV-Min-Fe Fum-FA-DHA (PRENATAL 1 PO) 7/13/2020 Active                ALLERGIES  No Known Allergies    PHYSICAL EXAM  VITAL SIGNS: /73   Pulse 90   Temp 36.2 °C (97.1 °F) (Temporal)   Resp 16   Ht 1.676 m (5' 6\")   Wt 113.5 kg (250 lb 3.6 oz)   LMP 02/09/2020 (Exact Date)   SpO2 96%   BMI 40.39 kg/m²       Constitutional: Well developed, Well nourished, No acute distress, Non-toxic appearance.   HENT: Normocephalic, Atraumatic, Bilateral external ears normal, Oropharynx moist, No oral exudates, Nose normal.   Eyes: PERRLA, EOMI, Conjunctiva normal, No discharge.   Neck: Normal range of motion, No tenderness, Supple, No stridor.   Cardiovascular: Normal heart rate, Normal rhythm, No murmurs, No rubs, No gallops.   Thorax & Lungs: Normal breath sounds, No respiratory distress, No wheezing, No chest tenderness.   Abdomen: Bowel sounds normal, Soft, No tenderness, No masses, No pulsatile masses.   Skin: Right groin has 2 distinct lesions, one is 2 x 2 cm on the right medial proximal thigh the other one is 3 x 3 cm minimal surrounding erythema no extension into the perineum   back: No tenderness, No CVA tenderness.   Genitalia: External genitalia appear normal, No masses or lesions. No discharge.   Extremities: Intact distal pulses, No edema, No tenderness, No cyanosis, No clubbing.   Neurologic: Alert & oriented x 3, Normal motor function, Normal sensory function, No focal deficits noted.   Psychiatric: Anxious    Physician procedure: Incision and " drainage of 2 cutaneous abscesses.  The lesions in question on the right medial thigh were prepped with Betadine x3.  A total of 7 cc of 0.25% bupivacaine without epinephrine was infiltrated into the skin and deeper tissues for anesthesia.  We waited 15 minutes for patient comfort.  The area was again prepped with Betadine.  An 11 blade scalpel was used to make a 7 mm incision over the point of maximal fluance.  This was performed twice.  About 5 cc of purulent liquid was drained from each lesion wound culture was obtained.  Loculations were broken up with a blunt instrument and backing gauze was applied.  No complications    COURSE & MEDICAL DECISION MAKING  Pertinent Labs & Imaging studies reviewed. (See chart for details)  Accu-Chek demonstrated a blood sugar of 88    Patient is primarily abscesses with minimal skin involvement but given her pregnancy we will culture her and start her on Keflex.  She cannot get into urgent care PCP I recommended returning here in 48 hours for wound check to follow-up on culture results    FINAL IMPRESSION  1.  Right thigh abscesses x2    Electronically signed by: Bryan Sahu M.D., 7/14/2020 7:44 AM

## 2020-07-14 NOTE — ED TRIAGE NOTES
"Ambulatory to triage with   Chief Complaint   Patient presents with   • Wound Check   C/o \"boil\" to the right inner thigh. Has attempted use of warm compress, tylenol, and antiseptic \"wash\" x 4 days with no relief. C/o 6/10 pain. Pregnant. Sates hx of same.     "

## 2020-07-16 ENCOUNTER — OFFICE VISIT (OUTPATIENT)
Dept: URGENT CARE | Facility: PHYSICIAN GROUP | Age: 25
End: 2020-07-16
Payer: COMMERCIAL

## 2020-07-16 VITALS
OXYGEN SATURATION: 96 % | HEIGHT: 66 IN | DIASTOLIC BLOOD PRESSURE: 72 MMHG | RESPIRATION RATE: 16 BRPM | WEIGHT: 249.8 LBS | BODY MASS INDEX: 40.15 KG/M2 | TEMPERATURE: 97.1 F | HEART RATE: 83 BPM | SYSTOLIC BLOOD PRESSURE: 122 MMHG

## 2020-07-16 DIAGNOSIS — L02.91 ABSCESS: ICD-10-CM

## 2020-07-16 LAB
BACTERIA WND AEROBE CULT: NORMAL
GRAM STN SPEC: NORMAL
SIGNIFICANT IND 70042: NORMAL
SITE SITE: NORMAL
SOURCE SOURCE: NORMAL

## 2020-07-16 PROCEDURE — 99213 OFFICE O/P EST LOW 20 MIN: CPT | Performed by: NURSE PRACTITIONER

## 2020-07-16 NOTE — PROGRESS NOTES
"Subjective:      Alexis Toscano is a 24 y.o. female who presents with Wound Check (cyst on inner thigs needs re packing )    Reviewed past medical, surgical and family history. Reviewed prescription and OTC medications with patient in electronic health record today  Allergies: Patient has no known allergies.            HPI This is a new problem.  See in ER on 07/14/2020 for abscess to right upper thigh. Wound has been draining. Packing fell out 1 day ago form lower abscess.  She has been keeping wound dry and covered with bandage.  Denies fevers, chills.     ROS  See HPI      Objective:     /72 (BP Location: Left arm, Patient Position: Sitting, BP Cuff Size: Adult)   Pulse 83   Temp 36.2 °C (97.1 °F) (Temporal)   Resp 16   Ht 1.676 m (5' 6\")   Wt 113.3 kg (249 lb 12.8 oz)   LMP 02/09/2020 (Exact Date)   SpO2 96%   BMI 40.32 kg/m²      Physical Exam  Vitals signs reviewed.   Constitutional:       Appearance: Normal appearance. She is normal weight.   HENT:      Head: Normocephalic.   Cardiovascular:      Rate and Rhythm: Normal rate.      Pulses: Normal pulses.   Skin:     General: Skin is warm.      Capillary Refill: Capillary refill takes less than 2 seconds.          Neurological:      Mental Status: She is alert and oriented to person, place, and time.   Psychiatric:         Mood and Affect: Mood normal.         Behavior: Behavior normal.         Thought Content: Thought content normal.     Repacking Procedure note:    Packing removed with moderate purulent drainage   Irrigated with NS   Repacked with 1/4 gauze   Covered with 4x4 gauze dressing and large Tegaderm   Educated in continued wound care and return for recheck in 48 hrs, continue me      Pertinent previous  Lab/ culture reviewed in EHR -     Component  2d ago   Significant Indicator  .     Source  WND     Site  Right groin     Gram Stain Result  Few WBCs.   No organisms seen.    Resulting Agency  [facility]       Narrative   Performed by: DON"   Right groin   Right groin       Specimen Collected: 07/14/20  8:20 AM  Last Resulted: 07/14/20 12:59 PM              Assessment/Plan:       1. Abscess        FU 48 hours/ prn   Educated in ongoing wound care

## 2020-08-05 ENCOUNTER — HOSPITAL ENCOUNTER (EMERGENCY)
Facility: MEDICAL CENTER | Age: 25
End: 2020-08-05
Attending: EMERGENCY MEDICINE | Admitting: EMERGENCY MEDICINE
Payer: COMMERCIAL

## 2020-08-05 VITALS
RESPIRATION RATE: 16 BRPM | BODY MASS INDEX: 40 KG/M2 | DIASTOLIC BLOOD PRESSURE: 74 MMHG | HEART RATE: 76 BPM | HEIGHT: 66 IN | SYSTOLIC BLOOD PRESSURE: 128 MMHG | TEMPERATURE: 98.1 F | WEIGHT: 248.9 LBS | OXYGEN SATURATION: 97 %

## 2020-08-05 DIAGNOSIS — L02.91 ABSCESS: ICD-10-CM

## 2020-08-05 LAB
GRAM STN SPEC: NORMAL
SIGNIFICANT IND 70042: NORMAL
SITE SITE: NORMAL
SOURCE SOURCE: NORMAL

## 2020-08-05 PROCEDURE — 700101 HCHG RX REV CODE 250: Mod: EDC | Performed by: EMERGENCY MEDICINE

## 2020-08-05 PROCEDURE — 99283 EMERGENCY DEPT VISIT LOW MDM: CPT | Mod: EDC

## 2020-08-05 PROCEDURE — 303977 HCHG I & D: Mod: EDC

## 2020-08-05 PROCEDURE — 87076 CULTURE ANAEROBE IDENT EACH: CPT | Mod: EDC

## 2020-08-05 PROCEDURE — 87070 CULTURE OTHR SPECIMN AEROBIC: CPT | Mod: EDC

## 2020-08-05 PROCEDURE — 87205 SMEAR GRAM STAIN: CPT | Mod: EDC

## 2020-08-05 RX ORDER — LIDOCAINE HYDROCHLORIDE 20 MG/ML
20 INJECTION, SOLUTION INFILTRATION; PERINEURAL ONCE
Status: COMPLETED | OUTPATIENT
Start: 2020-08-05 | End: 2020-08-05

## 2020-08-05 RX ORDER — LIDOCAINE HYDROCHLORIDE AND EPINEPHRINE 10; 10 MG/ML; UG/ML
20 INJECTION, SOLUTION INFILTRATION; PERINEURAL ONCE
Status: DISCONTINUED | OUTPATIENT
Start: 2020-08-05 | End: 2020-08-05

## 2020-08-05 RX ORDER — CLINDAMYCIN HYDROCHLORIDE 150 MG/1
300 CAPSULE ORAL 4 TIMES DAILY
Qty: 40 CAP | Refills: 0 | Status: SHIPPED | OUTPATIENT
Start: 2020-08-05 | End: 2020-08-10

## 2020-08-05 RX ADMIN — LIDOCAINE HYDROCHLORIDE 10 ML: 20 INJECTION, SOLUTION INFILTRATION; PERINEURAL at 10:30

## 2020-08-05 NOTE — ED TRIAGE NOTES
Chief Complaint   Patient presents with   • Abscess     boil right inner leg x1wk     Pt ambulated to triage with above complaint right inner thigh, denies fever. Pt was seen 2wks ago same problem.

## 2020-08-05 NOTE — ED PROVIDER NOTES
"ED Provider Note  CHIEF COMPLAINT  Chief Complaint   Patient presents with   • Abscess     boil right inner leg x1wk       HPI  Alexis Toscano is a 24 y.o. female who presents to the emergency department with complaint of abscess to the medial aspect of the right thigh.  She states that she recently had an incision and drainage to the right medial thigh.  She was placed on antibiotics.  Following that, the lesion did heal up but now has formed another abscess.  She is only pregnant as well.  Denies fever, shakes, chills, sweats, discharge or fluid from the wound.    REVIEW OF SYSTEMS  Positives as above. Pertinent negatives include fever, charge from the wound      PAST MEDICAL HISTORY  Past Medical History:   Diagnosis Date   • Anesthesia     pt stated her \"Dad woke up during surgery\"   • Anxiety    • Asthma     Inhalers PRN   • Axillary abscess 2012   • Breath shortness     Not a current problem.   • Cold     off & on 4 months   • Cold     Cold - started 2 days ago 3/26/17.  Congested, mucus, nosed plugged at night and sinus pressure - denies fever.   • Gynecological disorder     Endometriosis.   • Pain     during period   • Pneumonia    • Psychiatric problem     depression, anxiety   • Snoring        FAMILY HISTORY  Noncontributory    SOCIAL HISTORY  Social History     Socioeconomic History   • Marital status:      Spouse name: Not on file   • Number of children: Not on file   • Years of education: Not on file   • Highest education level: Not on file   Occupational History   • Not on file   Social Needs   • Financial resource strain: Not on file   • Food insecurity     Worry: Not on file     Inability: Not on file   • Transportation needs     Medical: Not on file     Non-medical: Not on file   Tobacco Use   • Smoking status: Former Smoker     Packs/day: 0.25     Years: 6.00     Pack years: 1.50     Types: Cigarettes     Quit date: 10/30/2017     Years since quittin.7   • Smokeless " tobacco: Never Used   • Tobacco comment: quit 1 week ago   Substance and Sexual Activity   • Alcohol use: No   • Drug use: Not Currently     Comment: marijuana.  Most days for endometriosis.   • Sexual activity: Yes     Partners: Male     Birth control/protection: Condom, Pill   Lifestyle   • Physical activity     Days per week: Not on file     Minutes per session: Not on file   • Stress: Not on file   Relationships   • Social connections     Talks on phone: Not on file     Gets together: Not on file     Attends [employer] service: Not on file     Active member of club or organization: Not on file     Attends meetings of clubs or organizations: Not on file     Relationship status: Not on file   • Intimate partner violence     Fear of current or ex partner: Not on file     Emotionally abused: Not on file     Physically abused: Not on file     Forced sexual activity: Not on file   Other Topics Concern   • Behavioral problems Not Asked   • Interpersonal relationships Not Asked   • Sad or not enjoying activities Not Asked   • Suicidal thoughts Not Asked   • Poor school performance Not Asked   • Reading difficulties Not Asked   • Speech difficulties Not Asked   • Writing difficulties Not Asked   • Inadequate sleep Not Asked   • Excessive TV viewing Not Asked   • Excessive video game use Not Asked   • Inadequate exercise Not Asked   • Sports related Not Asked   • Poor diet Not Asked   • Family concerns for drug/alcohol abuse Not Asked   • Poor oral hygiene Not Asked   • Bike safety Not Asked   • Family concerns vehicle safety Not Asked   Social History Narrative   • Not on file       SURGICAL HISTORY  Past Surgical History:   Procedure Laterality Date   • TONSILLECTOMY Bilateral 3/31/2017    Procedure: TONSILLECTOMY;  Surgeon: Ilya Escalona M.D.;  Location: SURGERY SAME DAY [facility];  Service:    • PELVISCOPY N/A 2/14/2017    Procedure: PELVISCOPY;  Surgeon: Sweta Yanez M.D.;  Location: SURGERY [facility]  "[facility];  Service:    • APPENDECTOMY  2008       CURRENT MEDICATIONS  Home Medications     Reviewed by Magali Conway R.N. (Registered Nurse) on 08/05/20 at 0939  Med List Status: Partial   Medication Last Dose Status   Acetaminophen (TYLENOL PO) 8/5/2020 Active   Prenatal MV-Min-Fe Fum-FA-DHA (PRENATAL 1 PO) 8/5/2020 Active                ALLERGIES  No Known Allergies    PHYSICAL EXAM  VITAL SIGNS: /74   Pulse 76   Temp 36.7 °C (98.1 °F) (Temporal)   Resp 16   Ht 1.676 m (5' 6\")   Wt 112.9 kg (248 lb 14.4 oz)   LMP 02/09/2020 (Exact Date)   SpO2 97%   BMI 40.17 kg/m²      Constitutional: Well developed, Well nourished, No acute distress, Non-toxic appearance.   Skin: Warm, 1.5 cm in diameter erythematous, fluctuant, slightly indurated lesion in the medial right thigh, no discharge  Extremities: Full range of motion, no deformity, no edema, skin findings as above.      RADIOLOGY/PROCEDURES  Incision and Drainage Procedure Note    Indication: Abscess    Procedure: The patient was positioned appropriately and the skin over the incision site was prepped with betadine and draped in a sterile fashion. Local anesthesia was obtained by infiltration using 2% Lidocaine without epinephrine.  An incision was then made over the apex of the lesion and approximately 3 cc of thick, foul smelling and purulent material was expressed. Loculations were not present. The drainage cavity was then irrigated, packed with sterile gauze and dressed with a sterile dressing. The patient’s tetanus status was up to date and did not require a booster dose.    The patient tolerated the procedure well.    Complications: None        COURSE & MEDICAL DECISION MAKING  Pertinent Labs & Imaging studies reviewed. (See chart for details)  This is a pleasant 24-year-old female who presents with abscess to the medial aspect of her right thigh.  She is pregnant currently therefore she received clindamycin orally.  The patient had I&D completed " as above and culture has been sent.  Patient tolerated the procedure well.  Strict return precautions have been given for evidence of expanding infection, cellulitis.    Discharge Medication List as of 8/5/2020 10:33 AM      START taking these medications    Details   clindamycin (CLEOCIN) 150 MG Cap Take 2 Caps by mouth 4 times a day for 5 days., Disp-40 Cap,R-0, Normal             FINAL IMPRESSION     1. Abscess Active         DISPOSITION:  Patient will be discharged home in stable condition.    FOLLOW UP:  [facility], Emergency Dept  [address] [facility] [id number] [phone]    If symptoms worsen    Nona Null M.D.  [address] [place] [id number] [phone]    Schedule an appointment as soon as possible for a visit   As needed      OUTPATIENT MEDICATIONS:  Discharge Medication List as of 8/5/2020 10:33 AM      START taking these medications    Details   clindamycin (CLEOCIN) 150 MG Cap Take 2 Caps by mouth 4 times a day for 5 days., Disp-40 Cap,R-0, Normal                  Electronically signed by: Darren Gavin D.O., 8/5/2020 9:49 AM

## 2020-08-05 NOTE — ED NOTES
Discharge teaching for abscess provided to patient. Reviewed home care, wound care, importance of hydration and when to return to ED with worsening symptoms. Rx for clindamycin sent to preferred pharmacy, instruction provided. Instructed on completing full course of antibiotics. Instructed on importance of follow up care with [facility], Emergency Dept  [address] [facility] [id number] [phone]    If symptoms worsen    Nona Null M.D.  [address] [place] [id number] [phone]    Schedule an appointment as soon as possible for a visit   As needed     All questions answered, patient verbalizes understanding to all teaching. Copy of discharge paperwork provided. Signed copy in chart. Armband removed. Pt alert, pink, interactive and in NAD. Ambulatory out of department in stable condition.

## 2020-08-05 NOTE — ED NOTES
Pt ambulatory to Peds [facility]. Agree with triage RN note. Instructed to change into gown. Pt alert, pink, interactive and in NAD. Reports abscess to R inner, upper thigh x 1 week. Reports drainage from site on Friday, none since that time. Denies fevers. Pt 26 wks pregnant. Call light within reach. Denies additional needs. Up for ERP eval.

## 2020-08-05 NOTE — DISCHARGE INSTRUCTIONS
Abscess/Boil  (Furuncle)     Perform Epsom salt soaks twice daily for 2 days. Finish antibiotics as prescribed. Take Tylenol as needed for pain. Return for reaccumulation of abscess, redness spreading more than 2 inches, fever over 101, flulike symptoms or ill appearance.    An abscess (boil or furuncle) is an infected area that contains a collection of pus.      SYMPTOMS  Signs and symptoms of an abscess include pain, tenderness, redness, or hardness. You may feel a moveable soft area under your skin. An abscess can occur anywhere in the body.      TREATMENT  An incision (cut by the caregiver) may have been made over your abscess so the pus could be drained out. Gauze may have been packed into the space or a drain may have been looped thru the abscess cavity (pocket). This provides a drain that will allow the cavity to heal from the inside outwards. The abscess may be painful for a few days, but should feel much better if it was drained. Your abscess, if seen early, may not have localized and may not have been drained. If not, another appointment may be required if it does not get better on its own or with medications.     HOME CARE INSTRUCTIONS  Ø Apply heat to encourage your body defenses and healing. Use warm compresses (warm wet washcloth) or a warm heating pad 3 to 4 times a day for 20 minutes each time. Do not sleep with a heating pad.   Ø Only take over-the-counter or prescription medicines for pain, discomfort, or fever as directed by your caregiver.         Ø Keep the skin and clothes clean around your abscess.   Ø If the abscess was drained, you will need to use gauze dressing (“4x4”) to collect any draining pus.  These dressing typically will need to be changed 3 or more times during the day.     SEEK MEDICAL CARE IF:  Ø You develop increased pain, swelling, redness, drainage, or bleeding in the wound site.  Ø You develop signs of generalized infection including muscle aches, chills, fever, or a general  ill feeling.  Ø An oral temperature above 102° F (38.9°) develops.     See your caregiver as directed for a recheck or sooner if you develop any of the symptoms described above. Take antibiotics (medicine that kills germs) as directed if they were prescribed.     AGREEMENT BETWEEN PATIENT AND HEALTHCARE TEAM:  Your signature on this document means that you:  Ø Understand these discharge instructions.   Ø Will monitor your condition.  Ø Will seek immediate medical attention as instructed.     Document Released: 09/27/2006  Document Re-Released: 03/26/2009  [employer]® Patient Information ©2009 [employer].

## 2020-08-09 LAB
BACTERIA WND AEROBE CULT: ABNORMAL
BACTERIA WND AEROBE CULT: ABNORMAL
GRAM STN SPEC: ABNORMAL
SIGNIFICANT IND 70042: ABNORMAL
SITE SITE: ABNORMAL
SOURCE SOURCE: ABNORMAL

## 2020-08-10 NOTE — ED NOTES
"ED Positive Culture Follow-up/Notification Note:    Date: 8/10/20     Patient seen in the ED on 8/5/2020 for abscess to the medial aspect of the right thigh. She  recently had an I&D to the right medial thigh and was placed on Keflex back in July. The lesion did heal but now has formed another abscess. While in the ED, the patient underwent another I&D. Patient is currently pregnant.   1. Abscess Active      Discharge Medication List as of 8/5/2020 10:33 AM      START taking these medications    Details   clindamycin (CLEOCIN) 150 MG Cap Take 2 Caps by mouth 4 times a day for 5 days., Disp-40 Cap,R-0, Normal             Allergies: Patient has no known allergies.     Vitals:    08/05/20 0923 08/05/20 0929 08/05/20 1056   BP: 154/79  128/74   Pulse: 90  76   Resp: 16  16   Temp: 36.3 °C (97.3 °F)  36.7 °C (98.1 °F)   TempSrc: Temporal  Temporal   SpO2: 95%  97%   Weight:  112.9 kg (248 lb 14.4 oz)    Height: 1.676 m (5' 6\")         Final cultures:   Results     Procedure Component Value Units Date/Time    CULTURE WOUND W/ GRAM STAIN [[id number]]  (Abnormal) Collected: 08/05/20 1003    Order Status: Completed Specimen: Wound from Right Leg Updated: 08/09/20 1717     Significant Indicator POS     Source WND     Site RIGHT LEG     Culture Result Light growth mixed skin ag.     Gram Stain Result Moderate WBCs.  Moderate Gram positive cocci.  Few Gram positive rods.       Culture Result Peptostreptococcus asaccharolyticus  Light growth      GRAM STAIN [[id number]] Collected: 08/05/20 1003    Order Status: Completed Specimen: Wound Updated: 08/05/20 1455     Significant Indicator .     Source WND     Site RIGHT LEG     Gram Stain Result Moderate WBCs.  Moderate Gram positive cocci.  Few Gram positive rods.            Plan:   I&D was more than likely appropriate treatment for the patient's abscess. Patient had mixed skin ag and light anaerobe growth. Spoke to patient via telephone about her wound. Patient stated she " already completed antibiotic therapy and her wound has improved greatly. Patient did not have any further questions. No other follow up is needed.    Saranya Leach, PharmD  T: [phone]

## 2020-08-15 ENCOUNTER — HOSPITAL ENCOUNTER (OUTPATIENT)
Dept: LAB | Facility: MEDICAL CENTER | Age: 25
End: 2020-08-15
Attending: OBSTETRICS & GYNECOLOGY
Payer: COMMERCIAL

## 2020-08-15 LAB
BASOPHILS # BLD AUTO: 0.3 % (ref 0–1.8)
BASOPHILS # BLD: 0.03 K/UL (ref 0–0.12)
EOSINOPHIL # BLD AUTO: 0.1 K/UL (ref 0–0.51)
EOSINOPHIL NFR BLD: 1.1 % (ref 0–6.9)
ERYTHROCYTE [DISTWIDTH] IN BLOOD BY AUTOMATED COUNT: 46.3 FL (ref 35.9–50)
GLUCOSE 1H P 50 G GLC PO SERPL-MCNC: 126 MG/DL (ref 70–139)
HCT VFR BLD AUTO: 36.7 % (ref 37–47)
HGB BLD-MCNC: 11.9 G/DL (ref 12–16)
IMM GRANULOCYTES # BLD AUTO: 0.02 K/UL (ref 0–0.11)
IMM GRANULOCYTES NFR BLD AUTO: 0.2 % (ref 0–0.9)
LYMPHOCYTES # BLD AUTO: 2.13 K/UL (ref 1–4.8)
LYMPHOCYTES NFR BLD: 24.4 % (ref 22–41)
MCH RBC QN AUTO: 30.8 PG (ref 27–33)
MCHC RBC AUTO-ENTMCNC: 32.4 G/DL (ref 33.6–35)
MCV RBC AUTO: 95.1 FL (ref 81.4–97.8)
MONOCYTES # BLD AUTO: 0.53 K/UL (ref 0–0.85)
MONOCYTES NFR BLD AUTO: 6.1 % (ref 0–13.4)
NEUTROPHILS # BLD AUTO: 5.91 K/UL (ref 2–7.15)
NEUTROPHILS NFR BLD: 67.9 % (ref 44–72)
NRBC # BLD AUTO: 0 K/UL
NRBC BLD-RTO: 0 /100 WBC
PLATELET # BLD AUTO: 270 K/UL (ref 164–446)
PMV BLD AUTO: 9.8 FL (ref 9–12.9)
RBC # BLD AUTO: 3.86 M/UL (ref 4.2–5.4)
TREPONEMA PALLIDUM IGG+IGM AB [PRESENCE] IN SERUM OR PLASMA BY IMMUNOASSAY: NORMAL
WBC # BLD AUTO: 8.7 K/UL (ref 4.8–10.8)

## 2020-08-15 PROCEDURE — 86780 TREPONEMA PALLIDUM: CPT

## 2020-08-15 PROCEDURE — 36415 COLL VENOUS BLD VENIPUNCTURE: CPT

## 2020-08-15 PROCEDURE — 82950 GLUCOSE TEST: CPT

## 2020-08-15 PROCEDURE — 85025 COMPLETE CBC W/AUTO DIFF WBC: CPT

## 2020-09-06 ENCOUNTER — HOSPITAL ENCOUNTER (OUTPATIENT)
Facility: MEDICAL CENTER | Age: 25
End: 2020-09-06
Attending: OBSTETRICS & GYNECOLOGY | Admitting: OBSTETRICS & GYNECOLOGY
Payer: COMMERCIAL

## 2020-09-06 VITALS
TEMPERATURE: 98.3 F | HEART RATE: 97 BPM | DIASTOLIC BLOOD PRESSURE: 69 MMHG | HEIGHT: 66 IN | SYSTOLIC BLOOD PRESSURE: 129 MMHG | WEIGHT: 240 LBS | RESPIRATION RATE: 19 BRPM | BODY MASS INDEX: 38.57 KG/M2

## 2020-09-06 LAB
APPEARANCE UR: ABNORMAL
COLOR UR AUTO: YELLOW
GLUCOSE UR QL STRIP.AUTO: NEGATIVE MG/DL
KETONES UR QL STRIP.AUTO: 15 MG/DL
LEUKOCYTE ESTERASE UR QL STRIP.AUTO: NEGATIVE
NITRITE UR QL STRIP.AUTO: NEGATIVE
PH UR STRIP.AUTO: 8 [PH] (ref 5–8)
PROT UR QL STRIP: NEGATIVE MG/DL
RBC UR QL AUTO: NEGATIVE
SP GR UR STRIP.AUTO: 1.02 (ref 1–1.03)

## 2020-09-06 PROCEDURE — 81002 URINALYSIS NONAUTO W/O SCOPE: CPT

## 2020-09-06 PROCEDURE — 59025 FETAL NON-STRESS TEST: CPT

## 2020-09-06 SDOH — ECONOMIC STABILITY: FOOD INSECURITY: WITHIN THE PAST 12 MONTHS, YOU WORRIED THAT YOUR FOOD WOULD RUN OUT BEFORE YOU GOT MONEY TO BUY MORE.: PATIENT DECLINED

## 2020-09-06 SDOH — ECONOMIC STABILITY: TRANSPORTATION INSECURITY
IN THE PAST 12 MONTHS, HAS LACK OF TRANSPORTATION KEPT YOU FROM MEETINGS, WORK, OR FROM GETTING THINGS NEEDED FOR DAILY LIVING?: PATIENT DECLINED

## 2020-09-06 SDOH — ECONOMIC STABILITY: FOOD INSECURITY: WITHIN THE PAST 12 MONTHS, THE FOOD YOU BOUGHT JUST DIDN'T LAST AND YOU DIDN'T HAVE MONEY TO GET MORE.: PATIENT DECLINED

## 2020-09-06 SDOH — ECONOMIC STABILITY: TRANSPORTATION INSECURITY
IN THE PAST 12 MONTHS, HAS THE LACK OF TRANSPORTATION KEPT YOU FROM MEDICAL APPOINTMENTS OR FROM GETTING MEDICATIONS?: PATIENT DECLINED

## 2020-09-06 ASSESSMENT — PAIN DESCRIPTION - PAIN TYPE: TYPE: ACUTE PAIN

## 2020-09-06 ASSESSMENT — PAIN SCALES - GENERAL: PAINLEVEL: 6

## 2020-09-06 NOTE — DISCHARGE INSTRUCTIONS
Follow up with Dr Morin this week. Return to hospital for increased abdominal pain, N&V or diarrhea.

## 2020-09-06 NOTE — PROGRESS NOTES
EDC - 2020 EGA 30.2    0100 - Pt arrived to labor and delivery for abd pain. Pt placed in room S231.   0110 -  External monitors in place x 2. VSS. Pt reports good fetal movement. No complaints of contractions, ROM, or vaginal bleeding. FOB at bedside. Urine dip collected.  0200 - Called Dr Mederos for report, order received to D/C in stable conditon to home by private car with SO.

## 2020-09-08 ENCOUNTER — HOSPITAL ENCOUNTER (OUTPATIENT)
Facility: MEDICAL CENTER | Age: 25
End: 2020-09-08
Attending: OBSTETRICS & GYNECOLOGY | Admitting: OBSTETRICS & GYNECOLOGY
Payer: COMMERCIAL

## 2020-09-08 VITALS
RESPIRATION RATE: 18 BRPM | SYSTOLIC BLOOD PRESSURE: 121 MMHG | DIASTOLIC BLOOD PRESSURE: 67 MMHG | HEIGHT: 66 IN | BODY MASS INDEX: 38.57 KG/M2 | TEMPERATURE: 98.4 F | HEART RATE: 90 BPM | OXYGEN SATURATION: 96 % | WEIGHT: 240 LBS

## 2020-09-08 PROCEDURE — 59025 FETAL NON-STRESS TEST: CPT

## 2020-09-08 RX ORDER — ASPIRIN 81 MG/1
81 TABLET, CHEWABLE ORAL DAILY
Status: ON HOLD | COMMUNITY
End: 2020-11-04

## 2020-09-08 ASSESSMENT — PAIN SCALES - GENERAL: PAINLEVEL: 0 - NO PAIN

## 2020-09-08 NOTE — PROGRESS NOTES
1010 Pt presents to L&D reporting decreased fetal movement; states she has not been able to get her kick counts in the 2 hour time frame. Pt oriented to LDA 4, EFM applied, VS, S. Pt has also reported some upper abdominal pain but nothing consistent. Pt's mother at bedside, supportive. Pt has water to drink.  1025 Audible movements heard; pt states she felt baby move. Multiple movements noted.  1050 Dr. Morin notified of patient's arrival/status, order received to discharge pt to home.  1105 Pt given  labor precautions and kick count instructions; verbalizes understanding; pt discharged to home, ambulatory.

## 2020-09-08 NOTE — DISCHARGE INSTRUCTIONS
General Instructions:  · If you think you are in labor, time contractions (lying on your left side) from the beginning of one contraction to the beginning of the next contraction for at least one hour.  · Increase fluid intake: you should consume 10-12 8 oz glasses of non-caffeinated fluid per day.  · Report any pressure or burning on urination to your physician.  · Monitor fetal movement: If you notice an absence or decrease in fetal movement, drink a large glass of water and rest on your side.  If there is no increase in movement, call your physician or go to the hospital for further evaluation.  · Report any sudden, sharp abdominal pain.  · Report any bleeding.  Spotting or pinkish discharge is normal after vaginal exam.  You may also spot after sexual intercourse.    Fetal Movement Counts  Patient Name: ________________________________________________ Patient Due Date: ____________________  What is a fetal movement count?    A fetal movement count is the number of times that you feel your baby move during a certain amount of time. This may also be called a fetal kick count. A fetal movement count is recommended for every pregnant woman. You may be asked to start counting fetal movements as early as week 28 of your pregnancy.  Pay attention to when your baby is most active. You may notice your baby's sleep and wake cycles. You may also notice things that make your baby move more. You should do a fetal movement count:  · When your baby is normally most active.  · At the same time each day.  A good time to count movements is while you are resting, after having something to eat and drink.  How do I count fetal movements?  1. Find a quiet, comfortable area. Sit, or lie down on your side.  2. Write down the date, the start time and stop time, and the number of movements that you felt between those two times. Take this information with you to your health care visits.  3. For 2 hours, count kicks, flutters, swishes,  rolls, and jabs. You should feel at least 10 movements during 2 hours.  4. You may stop counting after you have felt 10 movements.  5. If you do not feel 10 movements in 2 hours, have something to eat and drink. Then, keep resting and counting for 1 hour. If you feel at least 4 movements during that hour, you may stop counting.  Contact a health care provider if:  · You feel fewer than 4 movements in 2 hours.  · Your baby is not moving like he or she usually does.  Date: ____________ Start time: ____________ Stop time: ____________ Movements: ____________  Date: ____________ Start time: ____________ Stop time: ____________ Movements: ____________  Date: ____________ Start time: ____________ Stop time: ____________ Movements: ____________  Date: ____________ Start time: ____________ Stop time: ____________ Movements: ____________  Date: ____________ Start time: ____________ Stop time: ____________ Movements: ____________  Date: ____________ Start time: ____________ Stop time: ____________ Movements: ____________  Date: ____________ Start time: ____________ Stop time: ____________ Movements: ____________  Date: ____________ Start time: ____________ Stop time: ____________ Movements: ____________  Date: ____________ Start time: ____________ Stop time: ____________ Movements: ____________  This information is not intended to replace advice given to you by your health care provider. Make sure you discuss any questions you have with your health care provider.  Document Released: 01/17/2008 Document Revised: 01/07/2020 Document Reviewed: 01/26/2017  [employer]vier Patient Education © 2020 [employer]    Pre-term Labor (<37 weeks):  Call your physician or return to the hospital if:  · You have painless regular contractions more than 4 in one hour.  · Your water breaks (remember time and color).  · You have menstrual-like cramps, a low dull backache or pressure in your pelvis or back.  · Your baby does not move enough to  complete the daily kick count (10 movements in 2 hours).  · Your baby moves much less often than on the days before or you have not felt your baby move all day.  · Please review the MEDICATION LIST section of your AFTER VISIT SUMMARY document.  · Take your medication as prescribed      Other Instructions:  Please carefully review your entire AFTER VISIT SUMMARY document for all discharge instructions.

## 2020-09-18 ENCOUNTER — OFFICE VISIT (OUTPATIENT)
Dept: URGENT CARE | Facility: PHYSICIAN GROUP | Age: 25
End: 2020-09-18
Payer: COMMERCIAL

## 2020-09-18 VITALS
BODY MASS INDEX: 40.34 KG/M2 | RESPIRATION RATE: 16 BRPM | HEART RATE: 94 BPM | TEMPERATURE: 97.3 F | DIASTOLIC BLOOD PRESSURE: 60 MMHG | WEIGHT: 251 LBS | OXYGEN SATURATION: 97 % | HEIGHT: 66 IN | SYSTOLIC BLOOD PRESSURE: 122 MMHG

## 2020-09-18 DIAGNOSIS — L02.211 ABSCESS OF SKIN OF ABDOMEN: ICD-10-CM

## 2020-09-18 PROCEDURE — 99214 OFFICE O/P EST MOD 30 MIN: CPT | Performed by: PHYSICIAN ASSISTANT

## 2020-09-18 RX ORDER — CEPHALEXIN 500 MG/1
500 CAPSULE ORAL 3 TIMES DAILY
Qty: 21 CAP | Refills: 0 | Status: SHIPPED | OUTPATIENT
Start: 2020-09-18 | End: 2020-09-25

## 2020-09-19 NOTE — PROGRESS NOTES
Subjective:      Alexis Toscano is a 24 y.o. female who presents with Abscess (x1wk on stomach lower, bleeding )    Medications:    • aspirin Chew  • PRENATAL 1 PO  • TYLENOL PO    Allergies: Patient has no known allergies.    Problem List: Alexis Toscano has Axillary abscess; Pelvic pain in female; and Chronic tonsillitis on their problem list.    Surgical History:  Past Surgical History:   Procedure Laterality Date   • TONSILLECTOMY Bilateral 3/31/2017    Procedure: TONSILLECTOMY;  Surgeon: Ilya Escalona M.D.;  Location: SURGERY SAME DAY [facility] ORS;  Service:    • PELVISCOPY N/A 2/14/2017    Procedure: PELVISCOPY;  Surgeon: Sweta Yanez M.D.;  Location: SURGERY [facility];  Service:    • APPENDECTOMY  2008       Past Social Hx: Alexis Toscano  reports that she quit smoking about 2 years ago. Her smoking use included cigarettes. She has a 1.50 pack-year smoking history. She has never used smokeless tobacco. She reports previous drug use. She reports that she does not drink alcohol.     Past Family Hx:  Alexis Toscano family history is not on file.     Problem list, medications, and allergies reviewed by myself today in Epic.           Patient presents with:  Abscess: x1wk on stomach lower, bleeding/draining.  Pt has had these before, feels same as before but draining on its own this time.         Cyst  This is a new problem. The current episode started in the past 7 days. The problem occurs constantly. The problem has been gradually worsening. Pertinent negatives include no anorexia, change in bowel habit, chills, fever, rash or vomiting. Exacerbated by: pressure on cyst. She has tried position changes (soap and water) for the symptoms. The treatment provided no relief.       Review of Systems   Constitutional: Negative for chills and fever.   Gastrointestinal: Negative for anorexia, change in bowel habit and vomiting.   Skin: Negative for rash.        Abscess to skin  "lower abdomen   All other systems reviewed and are negative.         Objective:     /60   Pulse 94   Temp 36.3 °C (97.3 °F) (Temporal)   Resp 16   Ht 1.676 m (5' 6\")   Wt 113.9 kg (251 lb)   LMP 02/09/2020 (Exact Date)   SpO2 97%   BMI 40.51 kg/m²      Physical Exam  Vitals signs and nursing note reviewed. Exam conducted with a chaperone present.   Constitutional:       Appearance: Normal appearance. She is well-developed, well-groomed and normal weight. She is not ill-appearing or toxic-appearing.   HENT:      Head: Normocephalic and atraumatic.      Right Ear: Tympanic membrane normal.      Left Ear: Tympanic membrane normal.      Nose: Nose normal.      Mouth/Throat:      Mouth: Mucous membranes are moist.      Pharynx: Oropharynx is clear.   Eyes:      Extraocular Movements: Extraocular movements intact.      Conjunctiva/sclera: Conjunctivae normal.      Pupils: Pupils are equal, round, and reactive to light.   Neck:      Musculoskeletal: Normal range of motion and neck supple.   Cardiovascular:      Rate and Rhythm: Normal rate and regular rhythm.      Pulses: Normal pulses.      Heart sounds: Normal heart sounds.   Pulmonary:      Effort: Pulmonary effort is normal.      Breath sounds: Normal breath sounds.   Abdominal:      Palpations: Abdomen is soft.       Musculoskeletal: Normal range of motion.   Skin:     General: Skin is warm and dry.      Capillary Refill: Capillary refill takes less than 2 seconds.   Neurological:      General: No focal deficit present.      Mental Status: She is alert and oriented to person, place, and time.      Gait: Gait normal.   Psychiatric:         Mood and Affect: Mood normal.         Behavior: Behavior is cooperative.                 Assessment/Plan:         1. Abscess of skin of abdomen  cephALEXin (KEFLEX) 500 MG Cap     No I and D necessary, abscess is small and already draining.     PT to begin medications today as prescribed.    PT should follow up with PCP " in 1-2 days for re-evaluation if symptoms have not improved.  Discussed red flags and reasons to return to UC or ED.  Pt and/or family verbalized understanding of diagnosis and follow up instructions and was offered informational handout on diagnosis.  PT discharged.

## 2020-09-22 ASSESSMENT — ENCOUNTER SYMPTOMS
CHANGE IN BOWEL HABIT: 0
ANOREXIA: 0
CHILLS: 0
FEVER: 0
VOMITING: 0

## 2020-10-06 ENCOUNTER — OFFICE VISIT (OUTPATIENT)
Dept: URGENT CARE | Facility: PHYSICIAN GROUP | Age: 25
End: 2020-10-06
Payer: COMMERCIAL

## 2020-10-06 VITALS
DIASTOLIC BLOOD PRESSURE: 78 MMHG | BODY MASS INDEX: 40.5 KG/M2 | SYSTOLIC BLOOD PRESSURE: 120 MMHG | TEMPERATURE: 98.3 F | RESPIRATION RATE: 18 BRPM | WEIGHT: 252 LBS | HEIGHT: 66 IN | OXYGEN SATURATION: 96 % | HEART RATE: 84 BPM

## 2020-10-06 DIAGNOSIS — L02.214 ABSCESS OF GROIN, RIGHT: ICD-10-CM

## 2020-10-06 PROCEDURE — 99214 OFFICE O/P EST MOD 30 MIN: CPT | Performed by: PHYSICIAN ASSISTANT

## 2020-10-06 RX ORDER — CEPHALEXIN 500 MG/1
500 CAPSULE ORAL 3 TIMES DAILY
Qty: 15 CAP | Refills: 0 | Status: ON HOLD | OUTPATIENT
Start: 2020-10-06 | End: 2020-10-08

## 2020-10-06 RX ORDER — ONDANSETRON 4 MG/1
TABLET, FILM COATED ORAL
Status: ON HOLD | COMMUNITY
Start: 2020-07-22 | End: 2020-11-04

## 2020-10-06 RX ORDER — NYSTATIN AND TRIAMCINOLONE ACETONIDE 100000; 1 [USP'U]/G; MG/G
OINTMENT TOPICAL
Status: ON HOLD | COMMUNITY
Start: 2020-07-21 | End: 2020-10-08

## 2020-10-06 NOTE — PROGRESS NOTES
Subjective:      Alexis Toscano is a 24 y.o. female who presents with Abscess (rt pelvic area)    HPI:  This is a new problem. Alexis Toscano is a 24 y.o. female who presents today for evaluation of an abscess in her right groin. Patient was seen in urgent care 9/18 and treated for an abscess of her lower abdomen.  She was placed on a course of Keflex at the time.  She states that that abscess has resolved but shortly after she started those antibiotics she started to develop an infection in her right groin.  She states that she has been keeping it covered with a bandage but over the last few days she feels like there has been some redness and irritated skin around the area and it has also been draining blood and purulent discharge.  It is mildly tender.  She has not had any streaking from the wound.  No fever/chills.      Review of Systems   Constitutional: Negative for chills and fever.   HENT: Negative for sore throat.    Eyes: Negative for blurred vision.   Respiratory: Negative for shortness of breath.    Cardiovascular: Negative for chest pain and palpitations.   Gastrointestinal: Negative for nausea and vomiting.   Musculoskeletal: Negative for joint pain.   Skin:        Right groin abscess   Neurological: Negative for tingling and sensory change.       PMH:  has a past medical history of Anesthesia, Anxiety, Asthma, Axillary abscess (9/30/2012), Breath shortness, Cold, Cold, Gynecological disorder, Pain, Pneumonia (2015), Psychiatric problem, and Snoring.  MEDS:   Current Outpatient Medications:   •  nystatin-triamcinalone (MYCOLOG) 213058-5.1 UNIT/GM-% Ointment, APPLY 1 APPLICATION TWICE A DAY EXTERNALLY 14 DAYS, Disp: , Rfl:   •  ondansetron (ZOFRAN) 4 MG Tab tablet, 1 TABLET EVERY 6 HOURS ORALLY 7 DAYS, Disp: , Rfl:   •  aspirin (ASA) 81 MG Chew Tab chewable tablet, Take 81 mg by mouth every day., Disp: , Rfl:   •  Acetaminophen (TYLENOL PO), Take  by mouth., Disp: , Rfl:   •  Prenatal  "MV-Min-Fe Fum-FA-DHA (PRENATAL 1 PO), Take  by mouth every day., Disp: , Rfl:   ALLERGIES: No Known Allergies  SURGHX:   Past Surgical History:   Procedure Laterality Date   • TONSILLECTOMY Bilateral 3/31/2017    Procedure: TONSILLECTOMY;  Surgeon: Ilya Escalona M.D.;  Location: SURGERY SAME DAY [facility] ORS;  Service:    • PELVISCOPY N/A 2/14/2017    Procedure: PELVISCOPY;  Surgeon: Sweta Yanez M.D.;  Location: SURGERY [facility];  Service:    • APPENDECTOMY  2008     SOCHX:  reports that she quit smoking about 2 years ago. Her smoking use included cigarettes. She has a 1.50 pack-year smoking history. She has never used smokeless tobacco. She reports previous drug use. She reports that she does not drink alcohol.  FH: Family history was reviewed, no pertinent findings to report     Objective:     /78   Pulse 84   Temp 36.8 °C (98.3 °F)   Resp 18   Ht 1.676 m (5' 6\")   Wt 114.3 kg (252 lb)   LMP 02/09/2020 (Exact Date)   SpO2 96%   BMI 40.67 kg/m²      Physical Exam  Constitutional:       Appearance: She is well-developed.   HENT:      Head: Normocephalic and atraumatic.      Right Ear: External ear normal.      Left Ear: External ear normal.   Eyes:      Conjunctiva/sclera: Conjunctivae normal.      Pupils: Pupils are equal, round, and reactive to light.   Cardiovascular:      Rate and Rhythm: Normal rate and regular rhythm.      Heart sounds: Normal heart sounds. No murmur.   Pulmonary:      Effort: Pulmonary effort is normal.      Breath sounds: Normal breath sounds. No wheezing.   Skin:     General: Skin is warm and dry.      Capillary Refill: Capillary refill takes less than 2 seconds.             Comments: Anterior aspect of right groin exhibits an abscess with mild underlying fluctuance.  It is actively draining.  There is no noticeable induration and no streaking from the wound.  There is mild surrounding erythema which appears to be more like superficial skin irritation from the " bandage that she was using versus infection.   Neurological:      Mental Status: She is alert and oriented to person, place, and time.   Psychiatric:         Behavior: Behavior normal.         Judgment: Judgment normal.           The wound was cleansed with chlorhexidine and irrigated copiously with sterile saline.  There was some purulent discharge expressed with very light palpation and there was mild active bleeding throughout exam.       Assessment/Plan:     1. Abscess of groin, right  - Based on presentation of the wound, incision and drainage not indicated at this time.  Will have patient apply Bactroban cream 3 times daily and do warm compresses multiple times per day.  Patient was given a contingent prescription for Keflex to use if symptoms worsen or do not start to improve after approximately 3 days.  Strict return precautions discussed.              Differential Diagnosis, natural history, and supportive care discussed. Return to the Urgent Care or follow up with your PCP if symptoms fail to resolve, or for any new or worsening symptoms. Emergency room precautions discussed. Patient and/or family appears understanding of information.

## 2020-10-08 ENCOUNTER — HOSPITAL ENCOUNTER (OUTPATIENT)
Facility: MEDICAL CENTER | Age: 25
End: 2020-10-08
Attending: OBSTETRICS & GYNECOLOGY | Admitting: OBSTETRICS & GYNECOLOGY
Payer: COMMERCIAL

## 2020-10-08 VITALS
HEART RATE: 64 BPM | WEIGHT: 255 LBS | BODY MASS INDEX: 40.98 KG/M2 | HEIGHT: 66 IN | SYSTOLIC BLOOD PRESSURE: 119 MMHG | OXYGEN SATURATION: 96 % | TEMPERATURE: 97.6 F | RESPIRATION RATE: 18 BRPM | DIASTOLIC BLOOD PRESSURE: 63 MMHG

## 2020-10-08 LAB
ALBUMIN SERPL BCP-MCNC: 3 G/DL (ref 3.2–4.9)
ALBUMIN/GLOB SERPL: 0.9 G/DL
ALP SERPL-CCNC: 114 U/L (ref 30–99)
ALT SERPL-CCNC: 6 U/L (ref 2–50)
ANION GAP SERPL CALC-SCNC: 12 MMOL/L (ref 7–16)
AST SERPL-CCNC: 10 U/L (ref 12–45)
BASOPHILS # BLD AUTO: 0.3 % (ref 0–1.8)
BASOPHILS # BLD: 0.03 K/UL (ref 0–0.12)
BILIRUB SERPL-MCNC: 0.3 MG/DL (ref 0.1–1.5)
BUN SERPL-MCNC: 4 MG/DL (ref 8–22)
CALCIUM SERPL-MCNC: 8.6 MG/DL (ref 8.5–10.5)
CHLORIDE SERPL-SCNC: 105 MMOL/L (ref 96–112)
CO2 SERPL-SCNC: 21 MMOL/L (ref 20–33)
CREAT SERPL-MCNC: 0.38 MG/DL (ref 0.5–1.4)
CREAT UR-MCNC: 46.27 MG/DL
EOSINOPHIL # BLD AUTO: 0.1 K/UL (ref 0–0.51)
EOSINOPHIL NFR BLD: 1.1 % (ref 0–6.9)
ERYTHROCYTE [DISTWIDTH] IN BLOOD BY AUTOMATED COUNT: 44.7 FL (ref 35.9–50)
GLOBULIN SER CALC-MCNC: 3.2 G/DL (ref 1.9–3.5)
GLUCOSE SERPL-MCNC: 67 MG/DL (ref 65–99)
HCT VFR BLD AUTO: 33.9 % (ref 37–47)
HGB BLD-MCNC: 11.4 G/DL (ref 12–16)
IMM GRANULOCYTES # BLD AUTO: 0.03 K/UL (ref 0–0.11)
IMM GRANULOCYTES NFR BLD AUTO: 0.3 % (ref 0–0.9)
LYMPHOCYTES # BLD AUTO: 2.55 K/UL (ref 1–4.8)
LYMPHOCYTES NFR BLD: 28.7 % (ref 22–41)
MCH RBC QN AUTO: 30.5 PG (ref 27–33)
MCHC RBC AUTO-ENTMCNC: 33.6 G/DL (ref 33.6–35)
MCV RBC AUTO: 90.6 FL (ref 81.4–97.8)
MONOCYTES # BLD AUTO: 0.79 K/UL (ref 0–0.85)
MONOCYTES NFR BLD AUTO: 8.9 % (ref 0–13.4)
NEUTROPHILS # BLD AUTO: 5.37 K/UL (ref 2–7.15)
NEUTROPHILS NFR BLD: 60.7 % (ref 44–72)
NRBC # BLD AUTO: 0 K/UL
NRBC BLD-RTO: 0 /100 WBC
PLATELET # BLD AUTO: 237 K/UL (ref 164–446)
PMV BLD AUTO: 10 FL (ref 9–12.9)
POTASSIUM SERPL-SCNC: 3.6 MMOL/L (ref 3.6–5.5)
PROT SERPL-MCNC: 6.2 G/DL (ref 6–8.2)
PROT UR-MCNC: 6 MG/DL (ref 0–15)
PROT/CREAT UR: 130 MG/G (ref 10–107)
RBC # BLD AUTO: 3.74 M/UL (ref 4.2–5.4)
SODIUM SERPL-SCNC: 138 MMOL/L (ref 135–145)
URATE SERPL-MCNC: 3.7 MG/DL (ref 1.9–8.2)
WBC # BLD AUTO: 8.9 K/UL (ref 4.8–10.8)

## 2020-10-08 PROCEDURE — 59025 FETAL NON-STRESS TEST: CPT

## 2020-10-08 PROCEDURE — 99211 OFF/OP EST MAY X REQ PHY/QHP: CPT

## 2020-10-08 PROCEDURE — 84156 ASSAY OF PROTEIN URINE: CPT

## 2020-10-08 PROCEDURE — 82570 ASSAY OF URINE CREATININE: CPT

## 2020-10-08 PROCEDURE — 84550 ASSAY OF BLOOD/URIC ACID: CPT

## 2020-10-08 PROCEDURE — 36415 COLL VENOUS BLD VENIPUNCTURE: CPT

## 2020-10-08 PROCEDURE — 80053 COMPREHEN METABOLIC PANEL: CPT

## 2020-10-08 PROCEDURE — 85025 COMPLETE CBC W/AUTO DIFF WBC: CPT

## 2020-10-08 ASSESSMENT — PAIN SCALES - GENERAL: PAINLEVEL: 0 - NO PAIN

## 2020-10-09 ASSESSMENT — ENCOUNTER SYMPTOMS
VOMITING: 0
BLURRED VISION: 0
CHILLS: 0
NAUSEA: 0
FEVER: 0
TINGLING: 0
SHORTNESS OF BREATH: 0
SENSORY CHANGE: 0
ROS SKIN COMMENTS: RIGHT GROIN ABSCESS
SORE THROAT: 0
PALPITATIONS: 0

## 2020-10-09 NOTE — PROGRESS NOTES
Report received from AZAM Dave RN.   1939 Notified Dr. Mederos of lab results. Received orders to keep baby on monitor for 20 minutes and discharge if reactive.   2000 Discharge orders received from Dr. Mederos.  2009 Discharge instructions given. Pt questions addressed. Pt discharged in stable condition.

## 2020-10-09 NOTE — PROGRESS NOTES
23yo, , edc11/13, 34.6 presents from the office for a PIH workup. Pt denies LOF, vag bleeding. POS fm. EFM and [place] placed. VSS.  BP taken serially. Denies vision changes, headache or epigastric pain. 2+ pitting edema bilat lower Extremities. Reflexes 1+ bilat, no clonus.   183 Pt desires to go home. She is hungry and wants to be home for her baby's bedtime. Pt will stay if she has to but would prefer to go home. Lab her to draw pt.    Dr. Mederos reviewed tracing and updated on pts request to leave. Dr. Mederos would like pt to stay until her labs return. Okay to feed her.    Report to MARGE Jeff.

## 2020-10-28 ENCOUNTER — OFFICE VISIT (OUTPATIENT)
Dept: BEHAVIORAL HEALTH | Facility: CLINIC | Age: 25
End: 2020-10-28
Payer: COMMERCIAL

## 2020-10-28 DIAGNOSIS — F32.89 OTHER DEPRESSION: ICD-10-CM

## 2020-10-28 PROCEDURE — 90791 PSYCH DIAGNOSTIC EVALUATION: CPT | Performed by: SOCIAL WORKER
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
## 2020-10-29 ENCOUNTER — DOCUMENTATION (OUTPATIENT)
Dept: BEHAVIORAL HEALTH | Facility: CLINIC | Age: 25
End: 2020-10-29

## 2020-10-30 ENCOUNTER — HOSPITAL ENCOUNTER (OUTPATIENT)
Dept: OBGYN | Facility: MEDICAL CENTER | Age: 25
End: 2020-10-30
Attending: OBSTETRICS & GYNECOLOGY
Payer: COMMERCIAL

## 2020-10-30 LAB
COVID ORDER STATUS COVID19: NORMAL
SARS-COV+SARS-COV-2 AG RESP QL IA.RAPID: NOTDETECTED
SPECIMEN SOURCE: NORMAL

## 2020-10-30 PROCEDURE — C9803 HOPD COVID-19 SPEC COLLECT: HCPCS | Performed by: OBSTETRICS & GYNECOLOGY

## 2020-10-30 PROCEDURE — 87426 SARSCOV CORONAVIRUS AG IA: CPT

## 2020-11-02 ENCOUNTER — APPOINTMENT (OUTPATIENT)
Dept: OBGYN | Facility: MEDICAL CENTER | Age: 25
End: 2020-11-02
Attending: OBSTETRICS & GYNECOLOGY
Payer: COMMERCIAL

## 2020-11-02 ENCOUNTER — HOSPITAL ENCOUNTER (INPATIENT)
Facility: MEDICAL CENTER | Age: 25
LOS: 2 days | End: 2020-11-04
Attending: OBSTETRICS & GYNECOLOGY | Admitting: OBSTETRICS & GYNECOLOGY
Payer: COMMERCIAL

## 2020-11-02 ENCOUNTER — ANESTHESIA (OUTPATIENT)
Dept: ANESTHESIOLOGY | Facility: MEDICAL CENTER | Age: 25
End: 2020-11-02
Payer: COMMERCIAL

## 2020-11-02 ENCOUNTER — ANESTHESIA EVENT (OUTPATIENT)
Dept: ANESTHESIOLOGY | Facility: MEDICAL CENTER | Age: 25
End: 2020-11-02
Payer: COMMERCIAL

## 2020-11-02 LAB
AST SERPL-CCNC: 9 U/L (ref 12–45)
BUN SERPL-MCNC: 4 MG/DL (ref 8–22)
CREAT SERPL-MCNC: 0.32 MG/DL (ref 0.5–1.4)
ERYTHROCYTE [DISTWIDTH] IN BLOOD BY AUTOMATED COUNT: 43.9 FL (ref 35.9–50)
HCT VFR BLD AUTO: 35.3 % (ref 37–47)
HGB BLD-MCNC: 11.6 G/DL (ref 12–16)
HOLDING TUBE BB 8507: NORMAL
MCH RBC QN AUTO: 29.7 PG (ref 27–33)
MCHC RBC AUTO-ENTMCNC: 32.9 G/DL (ref 33.6–35)
MCV RBC AUTO: 90.5 FL (ref 81.4–97.8)
PLATELET # BLD AUTO: 235 K/UL (ref 164–446)
PMV BLD AUTO: 10.2 FL (ref 9–12.9)
RBC # BLD AUTO: 3.9 M/UL (ref 4.2–5.4)
URATE SERPL-MCNC: 4 MG/DL (ref 1.9–8.2)
WBC # BLD AUTO: 7.6 K/UL (ref 4.8–10.8)

## 2020-11-02 PROCEDURE — 84450 TRANSFERASE (AST) (SGOT): CPT

## 2020-11-02 PROCEDURE — 82565 ASSAY OF CREATININE: CPT

## 2020-11-02 PROCEDURE — 700111 HCHG RX REV CODE 636 W/ 250 OVERRIDE (IP): Performed by: OBSTETRICS & GYNECOLOGY

## 2020-11-02 PROCEDURE — 36415 COLL VENOUS BLD VENIPUNCTURE: CPT

## 2020-11-02 PROCEDURE — 700101 HCHG RX REV CODE 250: Performed by: ANESTHESIOLOGY

## 2020-11-02 PROCEDURE — 4A1H7CZ MONITORING OF PRODUCTS OF CONCEPTION, CARDIAC RATE, VIA NATURAL OR ARTIFICIAL OPENING: ICD-10-PCS | Performed by: OBSTETRICS & GYNECOLOGY

## 2020-11-02 PROCEDURE — 10907ZC DRAINAGE OF AMNIOTIC FLUID, THERAPEUTIC FROM PRODUCTS OF CONCEPTION, VIA NATURAL OR ARTIFICIAL OPENING: ICD-10-PCS | Performed by: OBSTETRICS & GYNECOLOGY

## 2020-11-02 PROCEDURE — 770002 HCHG ROOM/CARE - OB PRIVATE (112)

## 2020-11-02 PROCEDURE — 59409 OBSTETRICAL CARE: CPT

## 2020-11-02 PROCEDURE — 700111 HCHG RX REV CODE 636 W/ 250 OVERRIDE (IP): Performed by: ANESTHESIOLOGY

## 2020-11-02 PROCEDURE — 85027 COMPLETE CBC AUTOMATED: CPT

## 2020-11-02 PROCEDURE — A9270 NON-COVERED ITEM OR SERVICE: HCPCS

## 2020-11-02 PROCEDURE — 3E0E7GC INTRODUCTION OF OTHER THERAPEUTIC SUBSTANCE INTO PRODUCTS OF CONCEPTION, VIA NATURAL OR ARTIFICIAL OPENING: ICD-10-PCS | Performed by: OBSTETRICS & GYNECOLOGY

## 2020-11-02 PROCEDURE — 304965 HCHG RECOVERY SERVICES

## 2020-11-02 PROCEDURE — 10H073Z INSERTION OF MONITORING ELECTRODE INTO PRODUCTS OF CONCEPTION, VIA NATURAL OR ARTIFICIAL OPENING: ICD-10-PCS | Performed by: OBSTETRICS & GYNECOLOGY

## 2020-11-02 PROCEDURE — 84520 ASSAY OF UREA NITROGEN: CPT

## 2020-11-02 PROCEDURE — 3E033VJ INTRODUCTION OF OTHER HORMONE INTO PERIPHERAL VEIN, PERCUTANEOUS APPROACH: ICD-10-PCS | Performed by: OBSTETRICS & GYNECOLOGY

## 2020-11-02 PROCEDURE — 84550 ASSAY OF BLOOD/URIC ACID: CPT

## 2020-11-02 PROCEDURE — 88304 TISSUE EXAM BY PATHOLOGIST: CPT

## 2020-11-02 PROCEDURE — 10H07YZ INSERTION OF OTHER DEVICE INTO PRODUCTS OF CONCEPTION, VIA NATURAL OR ARTIFICIAL OPENING: ICD-10-PCS | Performed by: OBSTETRICS & GYNECOLOGY

## 2020-11-02 PROCEDURE — 700102 HCHG RX REV CODE 250 W/ 637 OVERRIDE(OP): Performed by: OBSTETRICS & GYNECOLOGY

## 2020-11-02 PROCEDURE — 700111 HCHG RX REV CODE 636 W/ 250 OVERRIDE (IP)

## 2020-11-02 PROCEDURE — A9270 NON-COVERED ITEM OR SERVICE: HCPCS | Performed by: OBSTETRICS & GYNECOLOGY

## 2020-11-02 PROCEDURE — 700102 HCHG RX REV CODE 250 W/ 637 OVERRIDE(OP)

## 2020-11-02 PROCEDURE — 0UQMXZZ REPAIR VULVA, EXTERNAL APPROACH: ICD-10-PCS | Performed by: OBSTETRICS & GYNECOLOGY

## 2020-11-02 PROCEDURE — 700105 HCHG RX REV CODE 258: Performed by: OBSTETRICS & GYNECOLOGY

## 2020-11-02 PROCEDURE — 303615 HCHG EPIDURAL/SPINAL ANESTHESIA FOR LABOR

## 2020-11-02 RX ORDER — TERBUTALINE SULFATE 1 MG/ML
INJECTION, SOLUTION SUBCUTANEOUS
Status: ACTIVE
Start: 2020-11-02 | End: 2020-11-03

## 2020-11-02 RX ORDER — SODIUM CHLORIDE, SODIUM LACTATE, POTASSIUM CHLORIDE, AND CALCIUM CHLORIDE .6; .31; .03; .02 G/100ML; G/100ML; G/100ML; G/100ML
1000 INJECTION, SOLUTION INTRAVENOUS
Status: DISCONTINUED | OUTPATIENT
Start: 2020-11-02 | End: 2020-11-02 | Stop reason: HOSPADM

## 2020-11-02 RX ORDER — MISOPROSTOL 200 UG/1
800 TABLET ORAL
Status: DISCONTINUED | OUTPATIENT
Start: 2020-11-02 | End: 2020-11-02 | Stop reason: HOSPADM

## 2020-11-02 RX ORDER — ALUMINA, MAGNESIA, AND SIMETHICONE 2400; 2400; 240 MG/30ML; MG/30ML; MG/30ML
30 SUSPENSION ORAL EVERY 6 HOURS PRN
Status: DISCONTINUED | OUTPATIENT
Start: 2020-11-02 | End: 2020-11-02 | Stop reason: HOSPADM

## 2020-11-02 RX ORDER — CALCIUM CARBONATE 500 MG/1
1000 TABLET, CHEWABLE ORAL 4 TIMES DAILY
Status: DISCONTINUED | OUTPATIENT
Start: 2020-11-02 | End: 2020-11-04 | Stop reason: HOSPADM

## 2020-11-02 RX ORDER — ROPIVACAINE HYDROCHLORIDE 2 MG/ML
INJECTION, SOLUTION EPIDURAL; INFILTRATION; PERINEURAL
Status: COMPLETED
Start: 2020-11-02 | End: 2020-11-02

## 2020-11-02 RX ORDER — SODIUM CHLORIDE, SODIUM LACTATE, POTASSIUM CHLORIDE, AND CALCIUM CHLORIDE .6; .31; .03; .02 G/100ML; G/100ML; G/100ML; G/100ML
250 INJECTION, SOLUTION INTRAVENOUS PRN
Status: DISCONTINUED | OUTPATIENT
Start: 2020-11-02 | End: 2020-11-02 | Stop reason: HOSPADM

## 2020-11-02 RX ORDER — DOCUSATE SODIUM 100 MG/1
100 CAPSULE, LIQUID FILLED ORAL 2 TIMES DAILY PRN
Status: DISCONTINUED | OUTPATIENT
Start: 2020-11-02 | End: 2020-11-04 | Stop reason: HOSPADM

## 2020-11-02 RX ORDER — ONDANSETRON 2 MG/ML
4 INJECTION INTRAMUSCULAR; INTRAVENOUS EVERY 4 HOURS PRN
Status: DISCONTINUED | OUTPATIENT
Start: 2020-11-02 | End: 2020-11-04 | Stop reason: HOSPADM

## 2020-11-02 RX ORDER — ROPIVACAINE HYDROCHLORIDE 2 MG/ML
INJECTION, SOLUTION EPIDURAL; INFILTRATION; PERINEURAL CONTINUOUS
Status: DISCONTINUED | OUTPATIENT
Start: 2020-11-02 | End: 2020-11-03

## 2020-11-02 RX ORDER — HYDROCODONE BITARTRATE AND ACETAMINOPHEN 10; 325 MG/1; MG/1
1 TABLET ORAL EVERY 4 HOURS PRN
Status: DISCONTINUED | OUTPATIENT
Start: 2020-11-02 | End: 2020-11-04 | Stop reason: HOSPADM

## 2020-11-02 RX ORDER — SODIUM CHLORIDE, SODIUM LACTATE, POTASSIUM CHLORIDE, CALCIUM CHLORIDE 600; 310; 30; 20 MG/100ML; MG/100ML; MG/100ML; MG/100ML
INJECTION, SOLUTION INTRAVENOUS PRN
Status: DISCONTINUED | OUTPATIENT
Start: 2020-11-02 | End: 2020-11-03

## 2020-11-02 RX ORDER — LIDOCAINE HYDROCHLORIDE AND EPINEPHRINE 15; 5 MG/ML; UG/ML
INJECTION, SOLUTION EPIDURAL
Status: COMPLETED | OUTPATIENT
Start: 2020-11-02 | End: 2020-11-02

## 2020-11-02 RX ORDER — ACETAMINOPHEN 325 MG/1
325 TABLET ORAL EVERY 4 HOURS PRN
Status: DISCONTINUED | OUTPATIENT
Start: 2020-11-02 | End: 2020-11-04 | Stop reason: HOSPADM

## 2020-11-02 RX ORDER — IBUPROFEN 600 MG/1
600 TABLET ORAL EVERY 6 HOURS PRN
Status: DISCONTINUED | OUTPATIENT
Start: 2020-11-02 | End: 2020-11-02

## 2020-11-02 RX ORDER — CARBOPROST TROMETHAMINE 250 UG/ML
250 INJECTION, SOLUTION INTRAMUSCULAR
Status: DISCONTINUED | OUTPATIENT
Start: 2020-11-02 | End: 2020-11-04 | Stop reason: HOSPADM

## 2020-11-02 RX ORDER — HYDROXYZINE 50 MG/1
50 TABLET, FILM COATED ORAL EVERY 6 HOURS PRN
Status: DISCONTINUED | OUTPATIENT
Start: 2020-11-02 | End: 2020-11-02 | Stop reason: HOSPADM

## 2020-11-02 RX ORDER — HYDROCODONE BITARTRATE AND ACETAMINOPHEN 5; 325 MG/1; MG/1
1 TABLET ORAL EVERY 4 HOURS PRN
Status: DISCONTINUED | OUTPATIENT
Start: 2020-11-02 | End: 2020-11-04 | Stop reason: HOSPADM

## 2020-11-02 RX ORDER — MISOPROSTOL 200 UG/1
600 TABLET ORAL
Status: DISCONTINUED | OUTPATIENT
Start: 2020-11-02 | End: 2020-11-03

## 2020-11-02 RX ORDER — HYDROCODONE BITARTRATE AND ACETAMINOPHEN 5; 325 MG/1; MG/1
1-2 TABLET ORAL EVERY 6 HOURS PRN
Status: DISCONTINUED | OUTPATIENT
Start: 2020-11-02 | End: 2020-11-04 | Stop reason: HOSPADM

## 2020-11-02 RX ORDER — ONDANSETRON 2 MG/ML
4 INJECTION INTRAMUSCULAR; INTRAVENOUS EVERY 6 HOURS PRN
Status: DISCONTINUED | OUTPATIENT
Start: 2020-11-02 | End: 2020-11-04 | Stop reason: HOSPADM

## 2020-11-02 RX ORDER — BUPIVACAINE HYDROCHLORIDE 2.5 MG/ML
INJECTION, SOLUTION EPIDURAL; INFILTRATION; INTRACAUDAL PRN
Status: DISCONTINUED | OUTPATIENT
Start: 2020-11-02 | End: 2020-11-02 | Stop reason: SURG

## 2020-11-02 RX ORDER — SODIUM CHLORIDE, SODIUM LACTATE, POTASSIUM CHLORIDE, CALCIUM CHLORIDE 600; 310; 30; 20 MG/100ML; MG/100ML; MG/100ML; MG/100ML
INJECTION, SOLUTION INTRAVENOUS CONTINUOUS
Status: DISPENSED | OUTPATIENT
Start: 2020-11-02 | End: 2020-11-02

## 2020-11-02 RX ORDER — MISOPROSTOL 200 UG/1
1000 TABLET ORAL 4 TIMES DAILY
Status: DISCONTINUED | OUTPATIENT
Start: 2020-11-02 | End: 2020-11-03

## 2020-11-02 RX ORDER — ONDANSETRON 4 MG/1
4 TABLET, ORALLY DISINTEGRATING ORAL EVERY 6 HOURS PRN
Status: DISCONTINUED | OUTPATIENT
Start: 2020-11-02 | End: 2020-11-04 | Stop reason: HOSPADM

## 2020-11-02 RX ORDER — DEXTROSE, SODIUM CHLORIDE, SODIUM LACTATE, POTASSIUM CHLORIDE, AND CALCIUM CHLORIDE 5; .6; .31; .03; .02 G/100ML; G/100ML; G/100ML; G/100ML; G/100ML
INJECTION, SOLUTION INTRAVENOUS CONTINUOUS
Status: DISCONTINUED | OUTPATIENT
Start: 2020-11-02 | End: 2020-11-03

## 2020-11-02 RX ORDER — IBUPROFEN 600 MG/1
600 TABLET ORAL EVERY 6 HOURS PRN
Status: DISCONTINUED | OUTPATIENT
Start: 2020-11-02 | End: 2020-11-04 | Stop reason: HOSPADM

## 2020-11-02 RX ORDER — MISOPROSTOL 200 UG/1
TABLET ORAL
Status: COMPLETED
Start: 2020-11-02 | End: 2020-11-02

## 2020-11-02 RX ORDER — CARBOPROST TROMETHAMINE 250 UG/ML
250 INJECTION, SOLUTION INTRAMUSCULAR
Status: DISCONTINUED | OUTPATIENT
Start: 2020-11-02 | End: 2020-11-02 | Stop reason: HOSPADM

## 2020-11-02 RX ADMIN — OXYTOCIN 1 MILLI-UNITS/MIN: 10 INJECTION, SOLUTION INTRAMUSCULAR; INTRAVENOUS at 07:24

## 2020-11-02 RX ADMIN — ONDANSETRON 4 MG: 2 INJECTION INTRAMUSCULAR; INTRAVENOUS at 17:56

## 2020-11-02 RX ADMIN — SODIUM CHLORIDE, POTASSIUM CHLORIDE, SODIUM LACTATE AND CALCIUM CHLORIDE: 600; 310; 30; 20 INJECTION, SOLUTION INTRAVENOUS at 15:08

## 2020-11-02 RX ADMIN — MISOPROSTOL 1000 MCG: 200 TABLET ORAL at 20:00

## 2020-11-02 RX ADMIN — LIDOCAINE HYDROCHLORIDE,EPINEPHRINE BITARTRATE 3 ML: 15; .005 INJECTION, SOLUTION EPIDURAL; INFILTRATION; INTRACAUDAL; PERINEURAL at 14:34

## 2020-11-02 RX ADMIN — SODIUM CHLORIDE, POTASSIUM CHLORIDE, SODIUM LACTATE AND CALCIUM CHLORIDE: 600; 310; 30; 20 INJECTION, SOLUTION INTRAVENOUS at 07:23

## 2020-11-02 RX ADMIN — IBUPROFEN 600 MG: 600 TABLET, FILM COATED ORAL at 23:09

## 2020-11-02 RX ADMIN — SODIUM CHLORIDE, POTASSIUM CHLORIDE, SODIUM LACTATE AND CALCIUM CHLORIDE: 600; 310; 30; 20 INJECTION, SOLUTION INTRAVENOUS at 14:17

## 2020-11-02 RX ADMIN — BUPIVACAINE HYDROCHLORIDE 5 ML: 2.5 INJECTION, SOLUTION EPIDURAL; INFILTRATION; INTRACAUDAL; PERINEURAL at 16:23

## 2020-11-02 RX ADMIN — ANTACID TABLETS 1000 MG: 500 TABLET, CHEWABLE ORAL at 17:58

## 2020-11-02 RX ADMIN — ANTACID TABLETS 1000 MG: 500 TABLET, CHEWABLE ORAL at 20:54

## 2020-11-02 RX ADMIN — BUPIVACAINE HYDROCHLORIDE 10 ML: 2.5 INJECTION, SOLUTION EPIDURAL; INFILTRATION; INTRACAUDAL; PERINEURAL at 14:35

## 2020-11-02 RX ADMIN — SODIUM CHLORIDE, POTASSIUM CHLORIDE, SODIUM LACTATE AND CALCIUM CHLORIDE: 600; 310; 30; 20 INJECTION, SOLUTION INTRAVENOUS at 14:16

## 2020-11-02 RX ADMIN — ROPIVACAINE HYDROCHLORIDE 200 MG: 2 INJECTION, SOLUTION EPIDURAL; INFILTRATION at 14:39

## 2020-11-02 RX ADMIN — OXYTOCIN 125 ML/HR: 10 INJECTION, SOLUTION INTRAMUSCULAR; INTRAVENOUS at 20:39

## 2020-11-02 RX ADMIN — ROPIVACAINE HYDROCHLORIDE 200 MG: 2 INJECTION, SOLUTION EPIDURAL; INFILTRATION; PERINEURAL at 14:39

## 2020-11-02 ASSESSMENT — FIBROSIS 4 INDEX: FIB4 SCORE: 0.4

## 2020-11-02 ASSESSMENT — PATIENT HEALTH QUESTIONNAIRE - PHQ9
2. FEELING DOWN, DEPRESSED, IRRITABLE, OR HOPELESS: NOT AT ALL
SUM OF ALL RESPONSES TO PHQ9 QUESTIONS 1 AND 2: 0
SUM OF ALL RESPONSES TO PHQ9 QUESTIONS 1 AND 2: 0
1. LITTLE INTEREST OR PLEASURE IN DOING THINGS: NOT AT ALL
1. LITTLE INTEREST OR PLEASURE IN DOING THINGS: NOT AT ALL
2. FEELING DOWN, DEPRESSED, IRRITABLE, OR HOPELESS: NOT AT ALL

## 2020-11-02 ASSESSMENT — LIFESTYLE VARIABLES: EVER_SMOKED: NEVER

## 2020-11-02 ASSESSMENT — PAIN SCALES - GENERAL
PAINLEVEL: 0 - NO PAIN
PAIN_LEVEL: 6

## 2020-11-02 NOTE — PROGRESS NOTES
Report received, assessment done. Pt is comfortable and strip is reactive. Education re POC and pain control was done.   0724 Pit started.  1200 spoke to Dr Morin regarding AROM.  1305 DR Morin in SVE done 4cm 50% -2. AROM done clear large amount of fluid noted, IUPC was placed, variables to 75-85 started, Dr Mroin back in FSE was placed, pit is off, pt to right and left and then on hands and knees.  1320 amnioinfusion started.  1340 large amount of fluid noted again, pt to left side and amnioinfusion stopped after 400 ml.   1420Fluid leakage of approximately 300-400 ml is back out   1427 pt wants epidural, consent signed and hydration started.  1428 Dr Rosa in, epidural cath was placed.  1434 test dose in, tolerated well.  1445 pit restarted at 2 milliunit.  1500 velez was inserted.pt never had any relief, Dr Rosa is in OR.  1610 pt never got relief from epidural. Dr Rosa in and epidural cath is out and it was replaced at 1613.  1615 test dose given, tolerated well.  1627 pt feels some relief.   1630 two variables noted and pt was moved to right side.  1855 report given to Randee BIRD

## 2020-11-02 NOTE — ANESTHESIA PREPROCEDURE EVALUATION
Morbid obesity BMI 41  Relevant Problems   Other   (+) Chronic tonsillitis       Physical Exam    Airway   Mallampati: II  TM distance: >3 FB  Neck ROM: full       Cardiovascular - normal exam  Rhythm: regular  Rate: normal  (-) murmur     Dental - normal exam           Pulmonary - normal exam  Breath sounds clear to auscultation     Abdominal    Neurological - normal exam                 Anesthesia Plan    ASA 3   ASA physical status 3 criteria: morbid obesity - BMI greater than or equal to 40    Plan - epidural   Neuraxial block will be labor analgesia      Plan Factors:   Patient did not smoke on day of procedure.          Postoperative Plan: Postoperative administration of opioids is intended.    Pertinent diagnostic labs and testing reviewed    Informed Consent:    Anesthetic plan and risks discussed with patient.    Use of blood products discussed with: patient whom consented to blood products.

## 2020-11-02 NOTE — H&P
"LABOR AND DELIVERY ADMISSION H AND P:    ADMISSION DIAGNOSIS:    1.  IUP AT 38w3d - ROMAIN - 2020.  2.  IOL for chronic hypertension.  3.  GBS negative.  4.  Favorable cervix.    PLAN:    1.  Admit to L and D.  2.  IV pitocin per protocol.  3.  Ok for epidural.  4.  AROM - clear fluid.  IUPC placed.  5.  Abnormal fetal heart rate remote from delivery after AROM - FSE placed.    Recent Labs     20  0640   WBC 7.6   RBC 3.90*   HEMOGLOBIN 11.6*   HEMATOCRIT 35.3*   MCV 90.5   MCH 29.7   RDW 43.9   PLATELETCT 235   MPV 10.2     /63   Pulse 75   Temp 36.2 °C (97.2 °F) (Temporal)   Resp 18   Ht 1.67 m (5' 5.75\")   Wt 115.7 kg (255 lb)   SpO2 97%     A, A, and O x 3 NAD    SVE: 4/70%/-2/BBOW.    AROM at 1305 - clear fluid.    FHR - category I tracing.  Reactive and without decelerations.  After amniotomy, repeat deep variable decelerations from the baseline to 60-80 bpm noted.  IV pitocin 1/2'd.  Then IV pitocin d/c'd.  Position changes performed and patient moved to hands and kneees.    EFW - 3200 grams    TOCO: every 2-3 minutes    A/P: IUP at 38w3d admitted for IOL secondary to preexisting hypertension.    1.  GBS negative.  2.  On IV pitocin - up to 20 mU/min and now d/c'd secondary to deep variable decelerations after amniotomy.  3 . Amnioinfusion.  4.  Ok for epidural.  5.  Anticipate .    "

## 2020-11-02 NOTE — ANESTHESIA PROCEDURE NOTES
Epidural Block    Date/Time: 11/2/2020 2:34 PM  Performed by: Zev Rosa M.D.  Authorized by: Zev Rosa M.D.     Patient Location:  OB  Start Time:  11/2/2020 2:34 PM  Reason for Block: labor analgesia    patient identified, IV checked, site marked, risks and benefits discussed, surgical consent, monitors and equipment checked, pre-op evaluation and timeout performed    Patient Position:  Sitting  Prep: ChloraPrep, patient draped and sterile technique    Monitoring:  Blood pressure, continuous pulse oximetry and heart rate  Approach:  Midline  Location:  L4-L5  Injection Technique:  MANUEL saline  Skin infiltration:  Lidocaine  Strength:  1%  Dose:  3ml  Needle Type:  Tuohy  Needle Gauge:  17 G  Needle Length:  3.5 in  Loss of resistance::  7  Catheter Size:  19 G  Catheter at Skin Depth:  12  Test Dose Result:  Negative

## 2020-11-02 NOTE — CARE PLAN
Problem: Communication  Goal: The ability to communicate needs accurately and effectively will improve  Outcome: PROGRESSING SLOWER THAN EXPECTED  Note: Pt was asked to talk about any questions or concerns or desires about labor process.     Problem: Knowledge Deficit  Goal: Knowledge of disease process/condition, treatment plan, diagnostic tests, and medications will improve  Outcome: PROGRESSING SLOWER THAN EXPECTED  Note: POC was discussed with pt.

## 2020-11-02 NOTE — PROGRESS NOTES
0630: Pt  here for IOL d/t CHTN. Pt in stable condition. External monitors applied and VS taken. Pt denies concerns.   0645: IV started and labs drawn/sent.  0700: Chata BIRD assuming care for IOL

## 2020-11-03 LAB
ERYTHROCYTE [DISTWIDTH] IN BLOOD BY AUTOMATED COUNT: 43.8 FL (ref 35.9–50)
HCT VFR BLD AUTO: 33.1 % (ref 37–47)
HGB BLD-MCNC: 11.3 G/DL (ref 12–16)
MCH RBC QN AUTO: 30.5 PG (ref 27–33)
MCHC RBC AUTO-ENTMCNC: 34.1 G/DL (ref 33.6–35)
MCV RBC AUTO: 89.5 FL (ref 81.4–97.8)
PATHOLOGY CONSULT NOTE: NORMAL
PLATELET # BLD AUTO: 187 K/UL (ref 164–446)
PMV BLD AUTO: 9.9 FL (ref 9–12.9)
RBC # BLD AUTO: 3.7 M/UL (ref 4.2–5.4)
WBC # BLD AUTO: 10.3 K/UL (ref 4.8–10.8)

## 2020-11-03 PROCEDURE — 700102 HCHG RX REV CODE 250 W/ 637 OVERRIDE(OP): Performed by: OBSTETRICS & GYNECOLOGY

## 2020-11-03 PROCEDURE — 770002 HCHG ROOM/CARE - OB PRIVATE (112)

## 2020-11-03 PROCEDURE — 85027 COMPLETE CBC AUTOMATED: CPT

## 2020-11-03 PROCEDURE — A9270 NON-COVERED ITEM OR SERVICE: HCPCS | Performed by: OBSTETRICS & GYNECOLOGY

## 2020-11-03 PROCEDURE — 36415 COLL VENOUS BLD VENIPUNCTURE: CPT

## 2020-11-03 RX ADMIN — IBUPROFEN 600 MG: 600 TABLET, FILM COATED ORAL at 23:04

## 2020-11-03 RX ADMIN — IBUPROFEN 600 MG: 600 TABLET, FILM COATED ORAL at 13:15

## 2020-11-03 RX ADMIN — IBUPROFEN 600 MG: 600 TABLET, FILM COATED ORAL at 05:36

## 2020-11-03 NOTE — H&P
DATE OF ADMISSION:  2020    ADMISSION DIAGNOSES:  1.  Intrauterine pregnancy at 38+3 weeks' gestation, estimated due date   2020.  2.  Induction of labor for preexisting hypertension.  3.  Group B streptococcus negative.  4.  Favorable cervix.    HISTORY OF PRESENT ILLNESS:  The patient is a 25-year-old  2, para   1-0-0-1 at 38+3 weeks' gestation based upon an 8+4 weeks' ultrasound performed   on 2020.  The patient presented to labor and delivery for induction of   labor secondary to chronic hypertension with worsening blood pressures.    The patient was noted to be GBS negative.  Patient has a favorable cervix; at   the time of admission, her cervix was 2-3 cm, 70% effaced, -2 station.    The patient was to be started on IV Pitocin and may have an epidural for labor   analgesia.    PRENATAL CARE:  With Dr. Nona Morin, first visit at 8+4 weeks' gestation on   2020.  Total visits 9, total weight gain 5 pounds.  Third trimester   blood pressures 125 to 148 over 74 to 88.    PRENATAL LABS:  Blood type O positive, antibody screen negative, HIV negative,   rubella immune, RPR nonreactive, hepatitis B surface antigen negative.    Urinalysis was negative.  One-hour .  Hep C viral antibody negative.    GBS negative on 10/21/2020.  Preeclampsia labs were performed on 10/08/2020,   which were negative.    OBSTETRICAL HISTORY:  On 2018, 8-pound 3-ounce male infant via , no   complications.    ISSUES THIS PREGNANCY:  Morbid obesity, recurrent boils, hidradenitis   suppurativa and preexisting hypertension.    MEDICATIONS:  Prenatal vitamins and low-dose aspirin.    OBSTETRIC ULTRASOUND:  1.  On 2020 at 8+4 weeks' gestation, ROMAIN 2020.  2.  2020 at 13+5 weeks' gestation, ROMAIN 2020.  Fetal heart rate 150   beats per minute.  Fetal movements seen.  3.  On 2020 at 19+4 weeks' gestation, estimated fetal weight 304 g, 11   ounces, cervix 4.2 cm, female  fetus.  4.  On 10/16/2020 at 36+0 weeks' gestation, vertex presentation, normal fetal   growth.  Estimated fetal weight 2714 g, 5 pounds 15 ounces.  Normal anatomy,   posterior placenta.  Fetal heart rate 135 beats per minute.    PAST SURGICAL HISTORY:  Laparoscopy.    PAST MEDICAL HISTORY:  Recurrent groin abscesses and boils, hidradenitis   suppurativa.    ALLERGIES:  No known drug allergies.    SOCIAL HISTORY:  She denies alcohol, tobacco or drugs of abuse.    PHYSICAL EXAMINATION:  VITAL SIGNS:  On admission, blood pressure 121/63, pulse 75, temp 97.2,   respiratory rate 18.  GENERAL:  Alert, awake and oriented x3, in no acute distress.  PELVIC:  Sterile vaginal exam, 2-3 cm, 70% effaced, -2 station.    [place] -- darrick every 2-4 minutes.  Estimated fetal weight 3200 g.  Fetal   heart rate category 1 tracing, reactive without decels.  After amniotomy,   repetitive deep variable decels from the baseline of the 140s down to 60-80   beats per minute.  The IV Pitocin was halved and then discontinued, position   changes performed and an amnioinfusion initiated.    LABORATORY DATA:  Labs on admission, white count 7.6, hemoglobin 11.6,   hematocrit 35.3, platelets 253.  SARS-CoV-2 testing negative.      ASSESSMENT AND PLAN:  A 25-year-old  2, para 1-0-0-1 at 38+3 weeks'   gestation based upon an 8+4 weeks' ultrasound performed on 2020 who   presents to labor and delivery for induction of labor secondary to chronic   hypertension who is group B streptococcus negative and has a favorable cervix.    The patient will be admitted to labor and delivery.  She was started on IV   Pitocin per protocol.  She is GBS negative and does not require IV   antibiotics.  She may have an epidural for labor analgesia.  She underwent   amniotomy and variable decelerations were noted, thus an amnioinfusion will be   initiated.  We will anticipate a normal spontaneous vaginal delivery.        ____________________________________     MD ALEXA EATON / BRENDAN    DD:  11/02/2020 20:24:18  DT:  11/02/2020 21:19:55    D#:  [id number]  Job#:  [id number]

## 2020-11-03 NOTE — PROGRESS NOTES
Report received from HECTOR Bee RN. Assumed care of pt.    Call to Dr. Morin. Notified MD of cervical exam. Provider en route to hospital. Orders received to give 150 ml amnioinfusion. Amnioinfusion started.     of viable baby girl by Dr. Morin. 1st degree laceration and skin tag removal repaired by MD. APGARs 8/9.    Pt denies pain. Bleeding stable. Fundus firm. Breastfeeding initiated with RN assistance.    Pt transferred to [facility]. Report given to MARGE Grullon.

## 2020-11-03 NOTE — L&D DELIVERY NOTE
LABOR AND DELIVERY    DELIVERY SUMMARY    STAGE I LABOR:    The patient was admitted as a 26 yo  at 38w3d based upon an 8w3d u/s performed on 2020.  She was admitted for IOL secondary to chronic hypertension.  She was examined at the time of admission and her cervix was 2-3/70%/-2.  She was started on IV pitocin per protocol.  She underwent AROM of copious clear fluid on 2020 at 1305 and the FHR was notable for repeat deep variable decelerations.  Pitocin was discontinued and IUPC and FSE were placed and an amnioinfusion was initiated.  The patient received an epidural for labor analgesia and the IV pitocin was restarted after the FHR was category I and reassuring.  She progressed to completely dilated at 1937.  Category I-II tracing during stage I labor.    STAGE II LABOR: 10 minutes    I was present for a  of a viable female infant at 1947.  The vertex of the infant delivered without difficulty.  The patient was placed in Eliezer and no shoulder dystocia was encountered.  No  nuchal cord was palpated.  The remainder of the infant delivered without difficulty and the umbilical cord was noted to be wrapped around the infant left lower leg x 1.   The infant was placed on the mother's abdomen.  The infant's mouth and nose were bulb suctioned.  Delayed cord clamping for 3 minutes was performed and then the cord was clamped and cut.  A segment of cord was clamped and cut for a cord gas but this was held as the APGAR scores were 8 at one minute and 9 at five minutes.    The patient received IV pitocin after delivery of the infant.    The infant weight is pending.    STAGE III LABOR: 11 MINUTES    The placenta delivered intact with a 3-vessel cord at 195.      The patient's perineum was examined and found to have sustained bilateral first degree periurethral lacerations.  These lacerations were repaired in the usual fashion with 3-0 vicryl on an SH needle.  The patient has a 0.5 x 1 cm pedunculated  skin tag on her right groin and this was grasped with pick ups and excised with scissors and the skin was reapproximated with 3-0 vicryl on an SH needle.    Bimanual uterine massage and exploration were performed and a small amount of membranes were removed.      The placenta was notable for an accessory lobe.    The patient received 1000 mcg of cytotec per rectum x 1 after the above examination.    EBL - 300 cc

## 2020-11-03 NOTE — ANESTHESIA POSTPROCEDURE EVALUATION
Patient: Alexis Toscano    Procedure Summary     Date: 11/02/20 Room / Location:     Anesthesia Start: 1431 Anesthesia Stop: 1947    Procedure: Labor Epidural Diagnosis:     Scheduled Providers:  Responsible Provider: Zev Rosa M.D.    Anesthesia Type: epidural ASA Status: 3          Final Anesthesia Type: epidural  Last vitals  BP   Blood Pressure: 140/70    Temp   37.1 °C (98.7 °F)    Pulse   Pulse: 93   Resp   16    SpO2   97 %      Anesthesia Post Evaluation    Patient location during evaluation: PACU  Patient participation: complete - patient participated  Level of consciousness: awake and alert  Pain score: 6    Airway patency: patent  Anesthetic complications: no  Cardiovascular status: hemodynamically stable  Respiratory status: acceptable  Hydration status: euvolemic    PONV: none           Nurse Pain Score: 6 (NPRS)

## 2020-11-03 NOTE — CONSULTS
Mom holding baby skin to skin at time of visit. Mom states she was able to latch baby on her own and baby had a successful 7-10 minute breastfeeding. Baby has urinates 3 times and had 2 meconiums per mom since delivery.     This is mom's second baby but first breastfeeding experience. Baby is DC + and frequent feedings discussed with parents.    Questions answered regarding diet, caffeine, and alcohol ingestion during pregnancy. Mom denies THC use. Dad states he does smoke marijuana but is aware not to do it around baby or in the house. Dad also states he will change clothing after smoking prior to holding the baby.    Assistance offered at next feeding and for any developing questions or concerns.

## 2020-11-03 NOTE — ANESTHESIA QCDR
2019 [facility] Clinical Data Registry (for Quality Improvement)     Postoperative nausea/vomiting risk protocol (Adult = 18 yrs and Pediatric 3-17 yrs)- (430 and 463)  General inhalation anesthetic (NOT TIVA) with PONV risk factors: No  Provision of anti-emetic therapy with at least 2 different classes of agents: N/A  Patient DID NOT receive anti-emetic therapy and reason is documented in Medical Record: N/A    Multimodal Pain Management- (477)  Non-emergent surgery AND patient age >= 18: No  Use of Multimodal Pain Management, two or more drugs and/or interventions, NOT including systemic opioids:   Exception: Documented allergy to multiple classes of analgesics:     Smoking Abstinence (404)  Patient is current smoker (cigarette, pipe, e-cig, marijuanna): No  Elective Surgery:   Abstinence instructions provided prior to day of surgery:   Patient abstained from smoking on day of surgery:     Pre-Op Beta-Blocker in Isolated CABG (44)  Isolated CABG AND patient age >= 18: No  Beta-blocker admin within 24 hours of surgical incision:   Exception:of medical reason(s) for not administering beta blocker within 24 hours prior to surgical incision (e.g., not  indicated,other medical reason):     PACU assessment of acute postoperative pain prior to Anesthesia Care End- Applies to Patients Age = 18- (ABG7)  Initial PACU pain score is which of the following: < 7/10  Patient unable to report pain score: N/A    Post-anesthetic transfer of care checklist/protocol to PACU/ICU- (426 and 427)  Upon conclusion of case, patient transferred to which of the following locations: PACU/Non-ICU  Use of transfer checklist/protocol: Yes  Exclusion: Service Performed in Patient Hospital Room (and thus did not require transfer): N/A  Unplanned admission to ICU related to anesthesia service up through end of PACU care- (MD51)  Unplanned admission to ICU (not initially anticipated at anesthesia start time): No

## 2020-11-03 NOTE — PROGRESS NOTES
"0650 - Report received from Yadi NASCIMENTO RN, care assumed; patient is awake, cheerful/chatty affect/mood.   FOB \"Anthony\" is sleeping on the fold out chair at the .      Reports little sleep last night. Denies ill feeling, reports minimal discomfort; reports she does not have discomfort while resting or standing, reports mild discomfort of perineum and cramping while getting in and out of bed. Denies need for intervention.     Denies change to vision/edema/HA; states she has been getting up to the BR herself without assist, denies dizziness or weakness. Discussed POC, denies questions/concerns regarding care since arrival to [facility].     The patient is eager to have her IV removed, states the IV is uncomfortable. Removed.   Patient is eager for the billizap. States her first child was high and needed to go under the Carlos lights in the NBN. Patient went home with lights and ended up taking the NB to the hospital for a couple days. Support offered for concern. Discussed POC regarding time frame for testing.     States understanding of POC/expectations. States understanding regarding care of herself and NB.    Patient is planning to breastfeed the baby, latch has been successful since birth. Patient has changed diapers without question.   Patient and FOB encouraged to ask questions/state needs r/t  care. Both deny needs at this time. Both state understanding regarding breastfeeding and care/needs of [place].     Patient/FOB encouraged to call RN with all questions/concerns/needs PRN. RN/CNA contact information updated on the dry erase board, reviewed.    0745 - Carlos zap at 10. Report to Brett HURTADO RN in the NBN. Order for total and direct bilirubin. Patient states understanding.     1415 - NB completed feeding 15 minutes ago. NB to NBN for bilitherapy.   "

## 2020-11-03 NOTE — CARE PLAN
Problem: Safety  Goal: Will remain free from injury  Outcome: PROGRESSING AS EXPECTED  Goal: Will remain free from falls  Outcome: PROGRESSING AS EXPECTED     Problem: Infection  Goal: Will remain free from infection  Outcome: PROGRESSING AS EXPECTED     Problem: Safety  Goal: Free from accidental injury  Outcome: PROGRESSING AS EXPECTED  Goal: Free from intentional harm  Outcome: PROGRESSING AS EXPECTED  Goal: Free from self harm  Outcome: PROGRESSING AS EXPECTED     Problem: Knowledge Deficit  Goal: Patient/Support person demonstrates understanding regarding the progression of labor, available options and participates in decision-making process  Outcome: PROGRESSING AS EXPECTED     Problem: Pain  Goal: Alleviation of Pain or a reduction in pain to the patient's comfort goal  Outcome: PROGRESSING AS EXPECTED  Goal: Patient will have relaxed facial expressions and be able to rest between uterine contractions  Outcome: PROGRESSING AS EXPECTED

## 2020-11-03 NOTE — PROGRESS NOTES
Assumed care from labor and delivery. Oriented patient to room, call light, emergency light, TV, bed remote. Assessment completed, fundus firm, lochia light. Plan of care reviewed, verbalized understanding. Patient requests ibuprofen for pain at this time, will call if pain med intervention needed.  Patient is breast feeding infant.

## 2020-11-03 NOTE — ANESTHESIA TIME REPORT
Anesthesia Start and Stop Event Times     Date Time Event    11/2/2020 1425 Ready for Procedure     1431 Anesthesia Start     1947 Anesthesia Stop        Responsible Staff  11/02/20    Name Role Begin End    Zev Rosa M.D. Anesth 1431 1947        Preop Diagnosis (Free Text):  Pre-op Diagnosis             Preop Diagnosis (Codes):    Post op Diagnosis  Vaginal delivery      Premium Reason  A. 3PM - 7AM    Comments: labor epidural for vaginal delivery

## 2020-11-03 NOTE — PROGRESS NOTES
"PPD #1 s/p  and repair of 1st degree perineal lacerations.    S:  Doing well.  Requesting to stay until tomorrow for breast feeding issues.  She denies severe uterine cramping.  She is working on breast feeding.  Moderate lochia.  Ambulate TID.    O:  /66   Pulse 63   Temp 36.8 °C (98.2 °F) (Temporal)   Resp 17   Ht 1.67 m (5' 5.75\")   Wt 115.7 kg (255 lb)   SpO2 96%     A, A, and O x 3 NAD    FF u/1    No c/c/e    Recent Labs     20  0640 20  0603   WBC 7.6 10.3   RBC 3.90* 3.70*   HEMOGLOBIN 11.6* 11.3*   HEMATOCRIT 35.3* 33.1*   MCV 90.5 89.5   MCH 29.7 30.5   RDW 43.9 43.8   PLATELETCT 235 187   MPV 10.2 9.9     A/P: PPD #1 s/p  and repair of 1st degree perineal lacerations.    1.  Continue cares.  2.  Ambulate TID.  3.  Hgb stable.  4.  D/C tomorrow - per her desire to work on breast feeding.  5.  Chronic hypertension - blood pressures are improved post delivery.    6.  Follow up with Dr. Morin in 2 weeks for a blood pressure check and 6 weeks for a postpartum examination.    "

## 2020-11-03 NOTE — L&D DELIVERY NOTE
DATE OF SERVICE:  2020    Stage I labor:  The patient was admitted as a 25-year-old  2, para   1-0-0-1 at 38+3 weeks' gestation based upon an 8+3 week ultrasound performed   on 2020.  The patient was admitted for induction of labor secondary to   preexisting hypertension.  The [facility]  At the time of admission, her cervix was 2-3 cm, 70%   effaced, -2 station.  The patient was noted to be GBS negative.  The patient   was started on IV Pitocin per protocol.  The patient underwent artificial   rupture of membranes of copious clear fluid on 2020 at 1305.  After   rupture of membranes, the fetal heart rate was notable for repetitive deep   variable decelerations.  The Pitocin was initially halved and then   discontinued and an IUPC and FSE were placed.  An amnioinfusion was initiated   and position changes performed.  The fetal heart rate recovered.  The patient   received an epidural for labor analgesia and the IV Pitocin was restarted   after the fetal heart rate was category 1 and reassuring.    The patient progressed to completely dilated at 1937.    Category 1-2 tracing during stage I labor.    Stage II labor:  10 minutes.  I was present for normal spontaneous vaginal   delivery of a viable female infant at 1947 on 2020.  The vertex of the   infant delivered without difficulty.  The patient was in Eliezer for   maternal expulsive efforts and no shoulder dystocia was encountered.  No   nuchal cord was palpated.  The remainder of the infant delivered without   difficulty.  The umbilical cord was noted to be wrapped around the infant's   left lower leg x1.    The cord was reduced.  The infant was placed on the mother's abdomen.  Delayed   cord clamping for approximately 3 minutes was performed.  The infant's mouth   and nose were bulb suctioned.  The cord was clamped and cut after delayed cord   clamping.    A segment of cord was  clamped and cut for cord gas, but this was held as the   Apgars scores were 8 at 1 minute and 9 at 5 minutes.    The patient received IV Pitocin after delivery of the infant.    The infant's weight is pending.    Stage III labor:  11 minutes.  Placenta delivered intact with a 3-vessel cord   at 1958.    The patient's perineum was examined and she was found to have sustained   bilateral first-degree periurethral lacerations.  These lacerations were   repaired in the usual fashion with 3-0 Vicryl on an SH needle.    The patient was notable for a pedunculated 0.5 x 1 cm fleshy colored skin tag   on her right groin.  This was grasped with pickups, excised with scissors and   the skin was reapproximated with 3-0 Vicryl on SH needle.    Bimanual uterine massage and exploration were performed and small amount of   membranes were removed.  The placenta was notable for an accessory lobe.    The patient received 1000 mcg of Cytotec per rectum x1 after the above   examination.    ESTIMATED BLOOD LOSS:  300 mL.       ____________________________________     CLINT MAURER MD    HTA / NTS    DD:  11/02/2020 20:17:55  DT:  11/02/2020 20:43:05    D#:  [id number]  Job#:  [id number]

## 2020-11-03 NOTE — CARE PLAN
Problem: Altered physiologic condition related to immediate post-delivery state and potential for bleeding/hemorrhage  Goal: Patient physiologically stable as evidenced by normal lochia, palpable uterine involution and vital signs within normal limits  Outcome: PROGRESSING AS EXPECTED     Problem: Potential for postpartum infection related to presence of episiotomy/vaginal tear and/or uterine contamination  Goal: Patient will be absent from signs and symptoms of infection  Outcome: PROGRESSING AS EXPECTED     Problem: Alteration in comfort related to episiotomy, vaginal repair and/or after birth pains  Goal: Patient is able to ambulate, care for self and infant  Outcome: PROGRESSING AS EXPECTED     Problem: Potential knowledge deficit related to lack of understanding of self and  care  Goal: Patient will demonstrate ability to care for self and infant  Outcome: PROGRESSING AS EXPECTED

## 2020-11-04 VITALS
RESPIRATION RATE: 16 BRPM | BODY MASS INDEX: 40.98 KG/M2 | HEART RATE: 63 BPM | DIASTOLIC BLOOD PRESSURE: 64 MMHG | TEMPERATURE: 97.4 F | WEIGHT: 255 LBS | HEIGHT: 66 IN | OXYGEN SATURATION: 96 % | SYSTOLIC BLOOD PRESSURE: 121 MMHG

## 2020-11-04 PROCEDURE — 700102 HCHG RX REV CODE 250 W/ 637 OVERRIDE(OP): Performed by: OBSTETRICS & GYNECOLOGY

## 2020-11-04 PROCEDURE — A9270 NON-COVERED ITEM OR SERVICE: HCPCS | Performed by: OBSTETRICS & GYNECOLOGY

## 2020-11-04 RX ADMIN — IBUPROFEN 600 MG: 600 TABLET, FILM COATED ORAL at 06:19

## 2020-11-04 ASSESSMENT — EDINBURGH POSTNATAL DEPRESSION SCALE (EPDS)
I HAVE BLAMED MYSELF UNNECESSARILY WHEN THINGS WENT WRONG: NOT VERY OFTEN
I HAVE BEEN SO UNHAPPY THAT I HAVE HAD DIFFICULTY SLEEPING: NOT AT ALL
I HAVE BEEN ABLE TO LAUGH AND SEE THE FUNNY SIDE OF THINGS: AS MUCH AS I ALWAYS COULD
THINGS HAVE BEEN GETTING ON TOP OF ME: NO, I HAVE BEEN COPING AS WELL AS EVER
THE THOUGHT OF HARMING MYSELF HAS OCCURRED TO ME: NEVER
I HAVE BEEN SO UNHAPPY THAT I HAVE BEEN CRYING: NO, NEVER
I HAVE BEEN ANXIOUS OR WORRIED FOR NO GOOD REASON: NO, NOT AT ALL
I HAVE LOOKED FORWARD WITH ENJOYMENT TO THINGS: AS MUCH AS I EVER DID
I HAVE FELT SCARED OR PANICKY FOR NO GOOD REASON: NO, NOT AT ALL
I HAVE FELT SAD OR MISERABLE: NO, NOT AT ALL

## 2020-11-04 NOTE — DISCHARGE INSTRUCTIONS
REASONS TO CALL YOUR PEDIATRICIAN  · Diarrhea  · Projectile or forceful vomiting for more than one feeding  · Unusual rash lasting more than 24 hours  · Very sleepy, difficult to wake up  · Bright yellow or pumpkin colored skin with extreme sleepiness  · Temperature below 97.6 or above 99.6F  · Feeding problems  · Breathing problems  · Excessive crying with no known cause    REASONS TO CALL YOUR OBSTETRICIAN  · Persistent fever or shaking chills (Temperature higher than 100.4)  · Heavy bleeding (soaking more than 1 pad per hour); Passing clots  · Foul odor from vagina  · Mastitis (Breast infection; breast pain, chills, fever, redness)  · Urinary pain, burning or frequency  · Episiotomy infection  · Abdominal incision infection  · Severe depression longer than 24 hours    SAFE SLEEP POSITIONING FOR YOUR BABY  The American Academy for Pediatrics advises your baby should be placed on his/her back for sleeping.      · Baby should sleep by him or her self in a crib, portable crib or bassinet  · Baby should NOT share a bed with their parents  · Baby should ALWAYS be placed on his or her back to sleep, night time and at naps  · Baby should ALWAYS sleep on firm mattress with a tightly fitted sheet  · NO couches, waterbeds or anything soft  · Baby's sleep area should NOT contain any blankets, comforters, stuffed animals or any other soft items, (pillows, bumper pads, etc...)  · Baby's face should be kept uncovered at all times  · Baby should ALWAYS sleep in a smoke-free environment  · Do not dress baby too warmly to prevent over heating.    TAKING BABY'S TEMPERATURE  · Place thermometer under baby's armpit and hold arm close to body.  · Call pediatrician for temperature lower than 97.6 F or greater than  99.6F.    BATHE AND SHAMPOO BABY  · Gently wash baby with a soft cloth using warm water and mild soap - rinse well.  · Do not put baby in tub bath until umbilical cord falls off and appears well-healed.    NAIL CARE  · First  recommendation is to keep them covered to prevent facial scratching  · You may file with a fine leland board or glass file  · Please do not clip or bite nails as it could cause injury or bleeding and is a risk of infection  · A good time for nail care is while your baby is sleeping and moving less    CORD CARE  · Call baby's doctor if skin around umbilical cord is red, swollen or smells bad.    DIAPER AND DRESS BABY  · Fold diaper below umbilical cord until cord falls off.  · For baby girls:  gently wipe from front to back.  Mucous or pink tinged drainage is normal.  · For uncircumcised baby boys: do NOT pull back the foreskin to clean the penis.  Gently clean with warm water and soap.  · Dress baby in one more layer of clothing than you are wearing.  · Use a hat to protect from sun or cold.  NO ties or drawstrings.    URINATION AND BOWEL MOVEMENTS  · If formula feeding or breast milk is established, your baby should wet 6-8 diapers a day and have at least 2 bowel movements a day during the first month.  · Bowel movements color and type can vary from day to day.    CIRCUMCISION  · If you plan to have your son circumcised, you must speak to your baby's doctor before the operation.  · A consent form must be signed.  · Any concerns or questions must be addressed with the pediatrician.  · Your nurse will discuss proper cleaning procedures with you.    INFANT FEEDING  · Most newborns feed 8-12 times, every 24 hours.  YOU MAY NEED TO WAKE YOUR BABY UP TO FEED.  · Offer both breasts every 1 to 3 hours OR when your baby is showing feeding cues, such as rooting or bringing hand to mouth and sucking.  · [facility]'s experienced nurses can help you establish breastfeeding.  Please call your nurse when you are ready to breastfeed.  · If you are NOT planning to feed your baby breast milk, please discuss this with your nurse.    CAR SEAT  For your baby's safety and to comply with [place] State Law you will need to bring a car seat to  "the hospital before taking your baby home.  Please read your car seat instructions before your baby's discharge from the hospital.      · Make sure you place an emergency contact sticker on your baby's car seat with your baby's identifying information.  · Car seat information is available through [employer] Seat [employer] at [phone] and also at [email or web address].    HAND WASHING  · All family and friends should wash their hands:   Before and after holding the baby   Before feeding the baby   After using the restroom or changing the baby's diaper.    WOUND CARE  Ask your physician for additional care instructions.  In general:    ·  Incision:      · Keep clean and dry.    · Do NOT lift anything heavier than your baby for up to 6 weeks.    · There should not be any opening or pus.  · Episiotomy/Laceration   · Use a spray or sitz bath as needed. (Sit in 6 inches of warm water)  · Continue to use esme bottle until bleeding stops.    VAGINAL CARE  · Nothing inside vagina for 6 weeks: no sexual intercourse, tampons or douching.  · Bleeding may continue for 2-4 weeks.  Amount may vary.    · Call your physician for heavy bleeding which means soaking more than 1 pad per hour    BIRTH CONTROL  · It is possible to become pregnant at any time after delivery and while breastfeeding.  · Plan to discuss a method of birth control with your physician at your follow up visit.     DIET AND ELIMINATION  · Eating more fiber (bran cereal, fruits, and vegetables) and drinking plenty of fluids will help to avoid constipation.  · Urinary frequency after childbirth is normal.    POSTPARTUM BLUES  During the first few days after birth, you may experience a sense of the \"blues\" which may include impatience, irritability or even crying.  These feelings come and go quickly.  However, as many as 1 in 10 women experience emotional symptoms known as postpartum depression.    POSTPARTUM DEPRESSION  May start as early as the second or third " "day after delivery or take several weeks or months to develop.  Symptoms of \"blues\" are present, but are more intense:  Crying spells; loss of appetite; feelings of hopelessness or loss of control; fear of touching the baby; over concern or no concern at all about the baby; little or no concern about your own appearance/caring for yourself; and/or inability to sleep or excessive sleeping.  Contact your physician if you are experiencing any of these symptoms.    PREVENTING SHAKEN BABY  If you are angry or stressed, PUT THE BABY IN THE CRIB, step away, take some deep breaths, and wait until you are calm to care for the baby.  DO NOT SHAKE THE BABY.  You are not alone, call a supporter for help.    · [facility] 24/7 crisis call line (833-336-0836) or (1-621.293.9388)  · You can also text them, text \"ANSWER\" (474115)      "

## 2020-11-04 NOTE — PROGRESS NOTES
s/p Vag Del DD2    0700 - Report received from NOC RN. POC discussed, care assumed.   0719 - Dr. Castro at bedside. Update given. Discharge orders received  0750 -  Full Assessment complete. VSS. Pt denies pain at this time. Ambulating and voiding independently. Fundus firm one fingerbreadth below umbilicus. Scant lochia at this time. POC discussed with pt and family members, all questions answered.   1215 - pt given discharge instructions for post partum, questions answered at length. Pt would like to do 1330 feeding before moving to rooming in room.  1420 - pt discharged-taken to room 213 to room in for night. Educated that if any medical issues arrive while staying in room, pt would have to check into ED. Pt verbalized understanding.

## 2020-11-04 NOTE — PROGRESS NOTES
PP day #2    S:  Pt doing well.  Minimal lochia.  Breast feeding.  No problems voiding.  OTC Motrin for pain    O:  T 97.4 P 67  /64  ABD: soft, NT, FF below umb  EXT: trace pedal edema, no cords or HOmans  H/H   O+    A/P:  PP day #2 S/P  at term - doing well  1.  D/C home  2.  F/U Dr. Morin 6 weeks  3.  Pelvic rest for 6 weeks  4.  OTC Motrin and PNV

## 2020-11-04 NOTE — LACTATION NOTE
"Attempted to meet with MOB.  FOB stated MOB went down to [place].  Will attempt to meet with MOB when she is back in her room.    1205- Will meet with MOB in her room for latch assistance at 1330.  MOB reported having pain with latch.    Provided MOB with pump paperwork and written and verbal information on the outpatient lactation assistance available to her through the [facility] and the [facility].    MOB reported she continues to breastfeed, supplement infant's feeds with donor breast milk and pump as instructed.  MOB reported she the amount of colostrum she is expressing from each pumping session is increasing each pumping session and stated she is receiving more colostrum from this breast than her right breast.  Flange fit also to be assessed at the next feeding session.  Pump settings reviewed with MOB and are: 80 CPM down to 60 after 2 minutes/suction set to comfort at 15%- MOB informed may increase per comfort level/pumps for 15 minutes.    MOB verbalized understanding of all information provided to her and denied having any further lactation concerns and/or questions at this time.    Lactation to return to room at 1330.    1330-  In room for observance of latch.  MOB stated infant was brought out to her sooner from the NBN and has already fed.  Infant observed resting with eyes closed in MOB's arms with no signs of distress observed.  MOB reported infant fed at her breast for 5 minutes before falling asleep and stated she had pain with latch.  She stated the pain could be described as \"tugging\" which she was informed is normal.  MOB also reported she has highly sensitive nipples.    Flange fit assessed and flange fit of 25 mm at each breast appeared appropriate.     Provided MOB with In[employer] ludwin access code for additional breastfeeding videos and was encouraged to watch the \"Latching On\" video first.    MOB again reminded to follow feeding plan as instructed.  She also stated " "she picked up her personal breast pump from the [facility].  At the previous visit, MOB was informed of the difference between a hospital grade breast pump and a personal breast pump in building and protecting her milk supply, but chose to go with a personal breast pump for home use.    MOB denied having any further lactation questions and/or concerns at this time and was encouraged to call for lactation assistance as needed.  MOB to be discharged from the hospital and will \"room-in\" closer to the NBN.      "

## 2020-11-09 ENCOUNTER — OFFICE VISIT (OUTPATIENT)
Dept: OBGYN | Facility: CLINIC | Age: 25
End: 2020-11-09
Payer: COMMERCIAL

## 2020-11-09 DIAGNOSIS — O92.70 LACTATION PROBLEM: ICD-10-CM

## 2020-11-09 PROCEDURE — 99215 OFFICE O/P EST HI 40 MIN: CPT | Performed by: NURSE PRACTITIONER

## 2020-11-09 NOTE — PROGRESS NOTES
Summary: Mara has been pumping since discharge from hospital and is making sufficient milk which she was not able to do with her first baby; she offers the breast  a few times per day but no latch. Today  infant went to both sides bare breast and remove 1 ounce total mom pumped an additional 3.5 ounces.  Baby not yet efficient at the breast; going forward will practice breast-feeding 3-5 times per day with pumping continued to allow baby opportunity to learn.  Follow-up in 1 week    Subjective:     Alexis Toscano is a 25 y.o. female here for lactation care. She is here today with baby Komal    Concerns:   Latch on difficulties , engorgement, nipple pain , sleepy baby and baby not interested  HPI:   Pertinent  history:  38.3weeks  Mother does not have a history of advanced maternal age, GDM, hypertension prior to pregnancy, insulin resistance, multiple gestation, PCOS and thyroid disease. Common condition(s) which may interfere with milk supply.    Breast changes in pregnancy: Yes  History of breast surgeries: No      FEEDING HISTORY:    Past breastfeeding history:  First baby, insufficient supply, stopped early  Hospital course: Pain with latch,m pumping started, baby Isma + and in nursery for jaundice. Bottles started and pumping. Number provided for OP  Currently: 2020: Mara has been pumping since discharge from hospital and is making sufficient milk which she was not able to do with her first baby; she offers the breast  a few times per day but no latch.   Both breasts: Yes  Bottle feeds: 8x/24h   Not on breast, primarily bottle feeding and pumping    Supplement: Expressed breast milk  Quantity: 60ml  How given/devices:  Bottle    Nipple Shield Use: None      Breast Pumping:   Frequency: 7-8x/day  Type of pump: Platinum  Flange size/type: 25mm  NO pain with pumping Suction 21%, hx sensitive nipples    Maternal ROS:  Constitutional: No fever, chills. Feeling well  Gastrointestinal:  Negative for nausea and vomiting  Breasts: Soreness of breasts and Soreness of nipples (sensitive nipples and engorgement)  Psychiatric: Managing ok  Mental Health: No mention of feeling irritable, agitated, angry, overwhelmed, apathy, exhaustion nor having sleep changes outside infant feeds/demands or appetite changes     Objective:   Physical   General: no acute distress  Neurological:  Alert and oriented x3  Breasts: Symetrical , Soft, Plugged Duct - no evidence and Mastitis  - no S/S  Nipples: intact and erythematous  Psychiatric:  Normal mood and affect. Her behavior is normal. Judgment and thought content normal   Mental Health:  Did NOT exhibit sadness, crying, feeling overwhelmed, agitation or hypervigilance.       Assessment/Plan & Lactation Counseling:     Infant Weight History:   11[birth year]2/29  7#1.6oz  11/6/2020 6#11.0oz ped office  11/9/20 6#15.4oz    Infant intake at Breast:: L 0.6oz     R 0.4oz    Total 1.0oz  Milk Transfer at this feeding:   Ineffective breastfeeding; not able to transfer a full feed from breast r/t  Learning  Pumped: Type of Pump: Platinum    Quantity Pumped: L 1.5oz    R 2.0oz    Total 3.5oz  Initiation of Feeding: Needed to be roused  Position of Feeding:    Right: football and cross cradle  Left: football and cross cradle  Attachment Achieved: with difficulty  Nipple shield: N/A       Suck Pattern at the breast: Suck burst and normal rest with much pausing - learning  Suck Pattern on the bottle: Suck burst and normal rest still with pausing not as often, no choking  Behavior Following Observed Feeding: content    Nipple Pain from:High vacuum causing nipple strain resulting in damage , sensitive nippels    Latch: Assisted latch  Suckling/Feeding: attaches, audible swallows, baby roots, elicits JOAN, frequent pauses and rhythmic  Milk Supply Available: normal     Maternal Diagnosis/Problem:  Lactation problem, no latch      BREASTFEEDING PLAN  Discussed concerns and symptoms as listed  above in assessment and guidance summarized below.  • Topics reviewed included:  •  Triple feeding and its sustainability and its impact on the mother baby relationship  •  Maternal Mental Health: Discussed difficulty with breastfeeding and celebrated full supply. Mom hx anxiety, Tess Jernigan, MFT intake dated 10/29/20  •  Self Care. Discussed support, rest, getting out of the house each day, walk to the mailbox or drive somewhere,  a treat at a drive thru.  •  Milk supply is dependent on glandular tissue development, hormonal influences, how many times the baby removes milk and how well the breasts are emptied in a 24 hour period. This is a biological reality that we can NOT work around. If, for any reason, your baby is not latching, or you are not able to nurse, then it is important for you to remove the milk instead by pumping or hand expression.  There's no magic trick, tea, food, drink, cookie or supplement that will increase your milk supply. One  must  effectively remove milk to continue to make and maximize milk. In the early days and weeks that can be 8+ times in 24 hours. For older babies, on average 6-7 + times in 24 hours.    •  Feeding:   o Feed your baby every 1.5-3 hours, more often if baby acts hungry.   o Awaken baby for feeding if going over 3 hours in the day.   o Until back to birth weight, ONE four hour at night is acceptable if has had 8 prior feedings in 24 hours.    •  Supplement: No formula supplement is needed  o Give Expressed Breast Milk with paced bottle feeding  ? Paced Bottle Feeding Video: https://www.[employer].com/watch?v=ZaPCJ9bUY9W  •  Positioning Techniques for bare breast  o Suggested positions Cross cradle and Football  o Fine tune position by making sure your fingers beneath the breast as well as your bra, are out of the way of your baby's chin.  o Positioning:  Many positions shown, great sidelying at 7 minutes.   o See  "http://globalhealthmedia.org/portfolio-items/positions-for-breastfeeding/?zijhjsgzkRO=24784  •  Latch on Techniques for bare breast. Modify for nipple shield use  o Fine tune latch:  - By holding your baby more securely at the breast, assisting your baby to stay attached by:  - Bringing your baby to your breast, not breast to the baby  - Your baby's cheek to touch breast securely, nose tipped back  - Hold your baby firmly in place so when your baby forgets to suck and picks it back up again your baby is in the correct spot. You will be extinguishing behavior and replacing it with a deeper latch to stimulate suck and provide satisfaction at the breast  - Your baby needs as much breast as deep in the mouth as possible to allow your nipples to heal and for you more importantly to maximize efficiency at the breast  - Latch is asymmetrical, leading with the chin, getting more underneath.  •  Nipple shield: Discussed for right nipple which is more sensitive but latched and did well without. We prefer the 24mm Medela. Before applying, roll shield in on itself and allow breast to be pulled  in to the tip.   •  Triple feeding: A short term solution: Breast/bottle/pump:  o FIRST decide what you can do at that feeding and you may decide to double feed -pump and bottle, if you are going to offer the breast at that feeding:  - nurse first and limit time on the breasts to 20+/- min total  - finish with bottle  - pump afterwards to finish emptying your breasts which will help increase milk supply  - use your own pumped milk, formula or donor human milk  - 30gm or ml = 1oz  •  Pumping Guidelines:  o Both breasts using \"Hands-on Pumping\" for 10-15 minutes to stimulate milk production.   o Pump 8 times in 24 hours, as baby improves at the breast will decrease dramatically  o Type of pump:  - Hospital grade  and Ameda Shante  - http://www.Unsubscribe.com.com/healthcare-professionals/videos/ameda-platinum-with-hygienikit-video  - Always double " pump  o How long will vary woman to woman, typically 8-15 minutes, or 1-2 minutes after flow slows. Today 12 minutes was effective  o Flange Fit: Freely moving nipple in the tunnel with some movement of the areola.  • Today's evaluation indicates appropriate flangeNipple care:  • May apply breastmilk  • Moist-oily ointment after feeding/pumping, ie Lanolin nipple butter, coconut or olive oil, if desired/needed 2-3 times/day until nipples are healed  • You do not need to wash this off before pumping or feeding the baby  • You may want to remove baby from breast when active swallowing stops to avoid prolonged nipple compression  • Soft shells recommended     Breast Care  Signs & Symptoms of Engorgement/swelling  How does the breast feel?    The breast will typically feel hard, with tightly stretched skin that may appear shiny, and you may experience warmth, tenderness, and/or throbbing. Engorgement may extend up into the armpit.  How does the areola feel?    The areola will typically feel hard (like the tip of your nose or your forehead) rather than soft (like your earlobe), with tight skin that may appear shiny. The nipple may increase in diameter and become flat and taut, making latch-on challenging.  You may also have a low-grade fever.  Moms’ experiences of engorgement differ.   Engorgement treatment  Before nursing  GENTLE breast massage from the chest wall toward the nipple area before nursing.  Cool compresses for up to 20 minutes before nursing.  Moist warmth for a few minutes before nursing may help the milk begin to flow (but will not help with the edema/swelling of engorgement). Some suggest standing in a warm shower right before nursing (with shower hitting back rather than breasts) and GENTLE hand expressing some milk, or immersing the breasts in a bowl or sink filled with warm water. Avoid using warmth for more than a few minutes as the warmth can increase swelling and inflammation.  While  nursing  GENTLE breast compressions, vibration and massage during the nursing session can reduce engorgement.  After nursing for a few minutes to soften the breast, it may be possible to obtain a better latch by removing baby from the breast and re-latching.  Between feedings  If your breast is uncomfortably full at the end of a feeding or between feedings, then express milk to comfort so that the breasts do not become overfull.  Hand expression may be most helpful (though obviously second to breastfeeding) as this drains the milk ducts better.  Mom might also use a hand pump or a quality electric pump on a low setting for no more than 10 minutes (engorged breast tissue is more susceptible to damage).   Use cold compresses (ice packs over a layer of cloth) between feedings; 20 minutes on, 20 minutes off; repeat as needed.  Many moms are most comfortable wearing a well fitting, supportive bra. Avoid tight/ill-fitting bras, as they can lead to plugged ducts and mastitis.  Non-steroidal anti-inflammatory such as ibuprofen (approved by the American Academy of Pediatrics for use in breastfeeding mothers) to relieve pain and inflammation.  AVOID:  Excess stimulation (for example, don’t direct a shower spray directly on the breasts).  Application of heat to the breasts between feedings. This can increase swelling and inflammation. If you must use heat to help with milk flow, limit to a few minutes only.  • Restricting fluids. This does not reduce engorgement. Drink to thirst.     •  Connect with other mothers:  o Facebook:   - [place] [facility]: [email or web address]/[email or web address].[email or web address]  - Well-Nourished Babies (Private group for questions and support): https://www.facebook.com/groups/905965931760557/  o Breastfeeding [place] for support not assessment: Tuesday  at 11am by Zoom,  you will be sent an invitation.   - You may instead copy and paste this link:     [email or web address]    - Passcode  [id number]  - You may share this link with friends; please don't post on social media      Follow up requires close monitoring in this time sensitive window of opportunity to establish milk supply and facilitate the learning of  breastfeeding.    Mom is encouraged to e-mail to update how the plan is working.  Pediatrician appointment: 2 weeks  Follow-up for infant weight check and dyad breastfeeding evaluation in 7 day(s)  Please call [phone] if you have not scheduled your next appointment    A total of 70 minutes, this does not include infant assessment time, were spent face to face with more than 50% spent counseling and coordinating care as detailed in the above note.     PLEASE NOTE: Some of this note was created using voice recognition software. I have made every reasonable attempt to correct obvious errors, but I expect that there may be errors of grammar and possibly content that I did not discover prior finalizing this note.  JENNA Christine.

## 2020-11-12 ENCOUNTER — TELEMEDICINE (OUTPATIENT)
Dept: BEHAVIORAL HEALTH | Facility: CLINIC | Age: 25
End: 2020-11-12
Payer: COMMERCIAL

## 2020-11-12 DIAGNOSIS — F43.20 ADJUSTMENT DISORDER, UNSPECIFIED TYPE: ICD-10-CM

## 2020-11-12 PROCEDURE — 90832 PSYTX W PT 30 MINUTES: CPT | Mod: 95,CR | Performed by: SOCIAL WORKER

## 2020-11-12 NOTE — BH THERAPY
[facility]  Therapy Progress Note    Patient Name: Alexis Toscano  Patient MRN: [id number]  Today's Date: 11/12/2020     Type of session:Individual psychotherapy  Length of session: 30 minutes  Persons in attendance:Patient and Children    Subjective/New Info: This evaluation was conducted via Zoom using secure and encrypted videoconferencing technology. The patient was in a private location in the [facility].    The patient's identity was confirmed and verbal consent was obtained for this virtual visit.    Client presented with her new born daughter and toddler son. Clinician informed client of her departure from [facility] at the end of the year. She was attentive with her children, and appeared to be bonding with baby well.  Discussed client's options for ongoing therapy, especially as she needs continued postpartum mental health support. Her postpartum depression began at about 4 months postpartum with her son, and she should continue to monitor her symptoms and mood.     She reported overall she is doing well. She has moments of feeling overwhelmed, and crying, but stated no intrusive thoughts about harming herself of others. Psychoeducation on Baby Blues, and informed her to watch any symptoms that go beyond 3 weeks. She reported overall she feels good, is sleeping well, and feels she has good support. Discussed postpartum plan, and signs she may need to get back on medication.     Client reported she is moving into her mom's house. She stated it is a better environment for her kids. She reported her mom is currently in rehab.     Objective/Observations:   Participation: Active verbal participation, Attentive and Engaged   Grooming: Good and Casual   Cognition: Alert and Fully Oriented   Eye contact: Good   Mood: Happy   Affect: Congruent with content   Thought process: Logical and Goal-directed   Speech: Rate within normal limits and Volume within normal limits   Other:     Diagnoses:    1. Adjustment disorder, unspecified type         Current risk:   SUICIDE: Low   Homicide: Low   Self-harm: Low   Relapse: Low   Other:    Safety Plan reviewed? Not Indicated   If evidence of imminent risk is present, intervention/plan: No imminent risk identified     Therapeutic Intervention(s): Supportive psychotherapy    Treatment Goal(s)/Objective(s) addressed:  Collaborative development of treatment goals in following sessions.      Progress toward Treatment Goals: No change, but reported stable mood    Plan:  - Continue Individual therapy  - Next appointment scheduled:  12/10/2020  - Patient is in agreement with the above plan:  YES    Tess Jernigan L.C.S.W.  11/12/2020

## 2020-12-05 ENCOUNTER — PRE-ADMISSION TESTING (OUTPATIENT)
Dept: ADMISSIONS | Facility: MEDICAL CENTER | Age: 25
End: 2020-12-05
Attending: OBSTETRICS & GYNECOLOGY
Payer: COMMERCIAL

## 2020-12-05 DIAGNOSIS — Z01.812 PRE-OPERATIVE LABORATORY EXAMINATION: ICD-10-CM

## 2020-12-05 LAB
COVID ORDER STATUS COVID19: NORMAL
SARS-COV-2 RNA RESP QL NAA+PROBE: NOTDETECTED
SPECIMEN SOURCE: NORMAL

## 2020-12-05 PROCEDURE — U0003 INFECTIOUS AGENT DETECTION BY NUCLEIC ACID (DNA OR RNA); SEVERE ACUTE RESPIRATORY SYNDROME CORONAVIRUS 2 (SARS-COV-2) (CORONAVIRUS DISEASE [COVID-19]), AMPLIFIED PROBE TECHNIQUE, MAKING USE OF HIGH THROUGHPUT TECHNOLOGIES AS DESCRIBED BY CMS-2020-01-R: HCPCS

## 2020-12-05 PROCEDURE — C9803 HOPD COVID-19 SPEC COLLECT: HCPCS

## 2020-12-07 ENCOUNTER — PRE-ADMISSION TESTING (OUTPATIENT)
Dept: ADMISSIONS | Facility: MEDICAL CENTER | Age: 25
End: 2020-12-07
Attending: OBSTETRICS & GYNECOLOGY
Payer: COMMERCIAL

## 2020-12-07 RX ORDER — LISDEXAMFETAMINE DIMESYLATE 70 MG/1
70 CAPSULE ORAL EVERY MORNING
COMMUNITY
End: 2021-06-02

## 2020-12-07 RX ORDER — ESCITALOPRAM OXALATE 20 MG/1
20 TABLET ORAL
COMMUNITY
End: 2021-06-02

## 2020-12-09 ENCOUNTER — ANESTHESIA EVENT (OUTPATIENT)
Dept: SURGERY | Facility: MEDICAL CENTER | Age: 25
End: 2020-12-09
Payer: COMMERCIAL

## 2020-12-09 ENCOUNTER — ANESTHESIA (OUTPATIENT)
Dept: SURGERY | Facility: MEDICAL CENTER | Age: 25
End: 2020-12-09
Payer: COMMERCIAL

## 2020-12-09 ENCOUNTER — HOSPITAL ENCOUNTER (OUTPATIENT)
Facility: MEDICAL CENTER | Age: 25
End: 2020-12-09
Attending: OBSTETRICS & GYNECOLOGY | Admitting: OBSTETRICS & GYNECOLOGY
Payer: COMMERCIAL

## 2020-12-09 VITALS
RESPIRATION RATE: 21 BRPM | OXYGEN SATURATION: 97 % | HEART RATE: 60 BPM | SYSTOLIC BLOOD PRESSURE: 112 MMHG | TEMPERATURE: 98 F | DIASTOLIC BLOOD PRESSURE: 64 MMHG | HEIGHT: 66 IN | WEIGHT: 216.49 LBS | BODY MASS INDEX: 34.79 KG/M2

## 2020-12-09 DIAGNOSIS — G89.18 PAIN FOLLOWING SURGERY OR PROCEDURE: ICD-10-CM

## 2020-12-09 LAB
ANION GAP SERPL CALC-SCNC: 10 MMOL/L (ref 7–16)
BUN SERPL-MCNC: 11 MG/DL (ref 8–22)
CALCIUM SERPL-MCNC: 8.9 MG/DL (ref 8.5–10.5)
CHLORIDE SERPL-SCNC: 106 MMOL/L (ref 96–112)
CO2 SERPL-SCNC: 23 MMOL/L (ref 20–33)
CREAT SERPL-MCNC: 0.46 MG/DL (ref 0.5–1.4)
ERYTHROCYTE [DISTWIDTH] IN BLOOD BY AUTOMATED COUNT: 43 FL (ref 35.9–50)
GLUCOSE SERPL-MCNC: 86 MG/DL (ref 65–99)
HCG UR QL: NEGATIVE
HCT VFR BLD AUTO: 41.3 % (ref 37–47)
HGB BLD-MCNC: 13.4 G/DL (ref 12–16)
MCH RBC QN AUTO: 29.3 PG (ref 27–33)
MCHC RBC AUTO-ENTMCNC: 32.4 G/DL (ref 33.6–35)
MCV RBC AUTO: 90.2 FL (ref 81.4–97.8)
PATHOLOGY CONSULT NOTE: NORMAL
PLATELET # BLD AUTO: 258 K/UL (ref 164–446)
PMV BLD AUTO: 9.7 FL (ref 9–12.9)
POTASSIUM SERPL-SCNC: 3.9 MMOL/L (ref 3.6–5.5)
RBC # BLD AUTO: 4.58 M/UL (ref 4.2–5.4)
SODIUM SERPL-SCNC: 139 MMOL/L (ref 135–145)
WBC # BLD AUTO: 7 K/UL (ref 4.8–10.8)

## 2020-12-09 PROCEDURE — 700102 HCHG RX REV CODE 250 W/ 637 OVERRIDE(OP): Performed by: OBSTETRICS & GYNECOLOGY

## 2020-12-09 PROCEDURE — 502704 HCHG DEVICE, LIGASURE IMPACT: Performed by: OBSTETRICS & GYNECOLOGY

## 2020-12-09 PROCEDURE — 160002 HCHG RECOVERY MINUTES (STAT): Performed by: OBSTETRICS & GYNECOLOGY

## 2020-12-09 PROCEDURE — 160048 HCHG OR STATISTICAL LEVEL 1-5: Performed by: OBSTETRICS & GYNECOLOGY

## 2020-12-09 PROCEDURE — 160009 HCHG ANES TIME/MIN: Performed by: OBSTETRICS & GYNECOLOGY

## 2020-12-09 PROCEDURE — 80048 BASIC METABOLIC PNL TOTAL CA: CPT

## 2020-12-09 PROCEDURE — 160035 HCHG PACU - 1ST 60 MINS PHASE I: Performed by: OBSTETRICS & GYNECOLOGY

## 2020-12-09 PROCEDURE — 501568 HCHG TROCAR, BLUNTPORT 12MM: Performed by: OBSTETRICS & GYNECOLOGY

## 2020-12-09 PROCEDURE — 85027 COMPLETE CBC AUTOMATED: CPT

## 2020-12-09 PROCEDURE — 501570 HCHG TROCAR, SEPARATOR: Performed by: OBSTETRICS & GYNECOLOGY

## 2020-12-09 PROCEDURE — 160046 HCHG PACU - 1ST 60 MINS PHASE II: Performed by: OBSTETRICS & GYNECOLOGY

## 2020-12-09 PROCEDURE — 501399 HCHG SPECIMAN BAG, ENDO CATC: Performed by: OBSTETRICS & GYNECOLOGY

## 2020-12-09 PROCEDURE — 500886 HCHG PACK, LAPAROSCOPY: Performed by: OBSTETRICS & GYNECOLOGY

## 2020-12-09 PROCEDURE — A9270 NON-COVERED ITEM OR SERVICE: HCPCS | Performed by: ANESTHESIOLOGY

## 2020-12-09 PROCEDURE — 501577 HCHG TROCAR, STEP 11MM: Performed by: OBSTETRICS & GYNECOLOGY

## 2020-12-09 PROCEDURE — 502587 HCHG PACK, D&C: Performed by: OBSTETRICS & GYNECOLOGY

## 2020-12-09 PROCEDURE — 700111 HCHG RX REV CODE 636 W/ 250 OVERRIDE (IP): Performed by: ANESTHESIOLOGY

## 2020-12-09 PROCEDURE — 501330 HCHG SET, CYSTO IRRIG TUBING: Performed by: OBSTETRICS & GYNECOLOGY

## 2020-12-09 PROCEDURE — 160041 HCHG SURGERY MINUTES - EA ADDL 1 MIN LEVEL 4: Performed by: OBSTETRICS & GYNECOLOGY

## 2020-12-09 PROCEDURE — 160036 HCHG PACU - EA ADDL 30 MINS PHASE I: Performed by: OBSTETRICS & GYNECOLOGY

## 2020-12-09 PROCEDURE — 160025 RECOVERY II MINUTES (STATS): Performed by: OBSTETRICS & GYNECOLOGY

## 2020-12-09 PROCEDURE — 700105 HCHG RX REV CODE 258: Performed by: OBSTETRICS & GYNECOLOGY

## 2020-12-09 PROCEDURE — 700101 HCHG RX REV CODE 250: Performed by: OBSTETRICS & GYNECOLOGY

## 2020-12-09 PROCEDURE — 88302 TISSUE EXAM BY PATHOLOGIST: CPT

## 2020-12-09 PROCEDURE — 700102 HCHG RX REV CODE 250 W/ 637 OVERRIDE(OP): Performed by: ANESTHESIOLOGY

## 2020-12-09 PROCEDURE — 81025 URINE PREGNANCY TEST: CPT

## 2020-12-09 PROCEDURE — 700101 HCHG RX REV CODE 250: Performed by: ANESTHESIOLOGY

## 2020-12-09 PROCEDURE — A9270 NON-COVERED ITEM OR SERVICE: HCPCS | Performed by: OBSTETRICS & GYNECOLOGY

## 2020-12-09 PROCEDURE — 501838 HCHG SUTURE GENERAL: Performed by: OBSTETRICS & GYNECOLOGY

## 2020-12-09 PROCEDURE — 160029 HCHG SURGERY MINUTES - 1ST 30 MINS LEVEL 4: Performed by: OBSTETRICS & GYNECOLOGY

## 2020-12-09 RX ORDER — HALOPERIDOL 5 MG/ML
1 INJECTION INTRAMUSCULAR
Status: DISCONTINUED | OUTPATIENT
Start: 2020-12-09 | End: 2020-12-09 | Stop reason: HOSPADM

## 2020-12-09 RX ORDER — HYDROMORPHONE HYDROCHLORIDE 1 MG/ML
0.4 INJECTION, SOLUTION INTRAMUSCULAR; INTRAVENOUS; SUBCUTANEOUS
Status: DISCONTINUED | OUTPATIENT
Start: 2020-12-09 | End: 2020-12-09 | Stop reason: HOSPADM

## 2020-12-09 RX ORDER — SODIUM CHLORIDE, SODIUM LACTATE, POTASSIUM CHLORIDE, CALCIUM CHLORIDE 600; 310; 30; 20 MG/100ML; MG/100ML; MG/100ML; MG/100ML
INJECTION, SOLUTION INTRAVENOUS CONTINUOUS
Status: DISCONTINUED | OUTPATIENT
Start: 2020-12-09 | End: 2020-12-09 | Stop reason: HOSPADM

## 2020-12-09 RX ORDER — LIDOCAINE HYDROCHLORIDE 40 MG/ML
SOLUTION TOPICAL PRN
Status: DISCONTINUED | OUTPATIENT
Start: 2020-12-09 | End: 2020-12-09 | Stop reason: SURG

## 2020-12-09 RX ORDER — BUPIVACAINE HYDROCHLORIDE AND EPINEPHRINE 2.5; 5 MG/ML; UG/ML
INJECTION, SOLUTION EPIDURAL; INFILTRATION; INTRACAUDAL; PERINEURAL
Status: DISCONTINUED | OUTPATIENT
Start: 2020-12-09 | End: 2020-12-09 | Stop reason: HOSPADM

## 2020-12-09 RX ORDER — ONDANSETRON 2 MG/ML
INJECTION INTRAMUSCULAR; INTRAVENOUS PRN
Status: DISCONTINUED | OUTPATIENT
Start: 2020-12-09 | End: 2020-12-09 | Stop reason: SURG

## 2020-12-09 RX ORDER — MIDAZOLAM HYDROCHLORIDE 1 MG/ML
INJECTION INTRAMUSCULAR; INTRAVENOUS PRN
Status: DISCONTINUED | OUTPATIENT
Start: 2020-12-09 | End: 2020-12-09 | Stop reason: SURG

## 2020-12-09 RX ORDER — ROCURONIUM BROMIDE 10 MG/ML
INJECTION, SOLUTION INTRAVENOUS PRN
Status: DISCONTINUED | OUTPATIENT
Start: 2020-12-09 | End: 2020-12-09 | Stop reason: SURG

## 2020-12-09 RX ORDER — OXYCODONE HCL 5 MG/5 ML
5 SOLUTION, ORAL ORAL
Status: COMPLETED | OUTPATIENT
Start: 2020-12-09 | End: 2020-12-09

## 2020-12-09 RX ORDER — OXYCODONE HYDROCHLORIDE AND ACETAMINOPHEN 5; 325 MG/1; MG/1
1-2 TABLET ORAL EVERY 4 HOURS PRN
Qty: 20 TAB | Refills: 0 | Status: SHIPPED | OUTPATIENT
Start: 2020-12-09 | End: 2020-12-16

## 2020-12-09 RX ORDER — CEFAZOLIN SODIUM 1 G/3ML
INJECTION, POWDER, FOR SOLUTION INTRAMUSCULAR; INTRAVENOUS PRN
Status: DISCONTINUED | OUTPATIENT
Start: 2020-12-09 | End: 2020-12-09 | Stop reason: SURG

## 2020-12-09 RX ORDER — DIPHENHYDRAMINE HYDROCHLORIDE 50 MG/ML
6.25 INJECTION INTRAMUSCULAR; INTRAVENOUS
Status: DISCONTINUED | OUTPATIENT
Start: 2020-12-09 | End: 2020-12-09 | Stop reason: HOSPADM

## 2020-12-09 RX ORDER — MEPERIDINE HYDROCHLORIDE 25 MG/ML
12.5 INJECTION INTRAMUSCULAR; INTRAVENOUS; SUBCUTANEOUS
Status: DISCONTINUED | OUTPATIENT
Start: 2020-12-09 | End: 2020-12-09 | Stop reason: HOSPADM

## 2020-12-09 RX ORDER — KETOROLAC TROMETHAMINE 30 MG/ML
INJECTION, SOLUTION INTRAMUSCULAR; INTRAVENOUS PRN
Status: DISCONTINUED | OUTPATIENT
Start: 2020-12-09 | End: 2020-12-09 | Stop reason: SURG

## 2020-12-09 RX ORDER — HYDROMORPHONE HYDROCHLORIDE 1 MG/ML
0.2 INJECTION, SOLUTION INTRAMUSCULAR; INTRAVENOUS; SUBCUTANEOUS
Status: DISCONTINUED | OUTPATIENT
Start: 2020-12-09 | End: 2020-12-09 | Stop reason: HOSPADM

## 2020-12-09 RX ORDER — ONDANSETRON 2 MG/ML
4 INJECTION INTRAMUSCULAR; INTRAVENOUS
Status: DISCONTINUED | OUTPATIENT
Start: 2020-12-09 | End: 2020-12-09 | Stop reason: HOSPADM

## 2020-12-09 RX ORDER — DEXAMETHASONE SODIUM PHOSPHATE 4 MG/ML
INJECTION, SOLUTION INTRA-ARTICULAR; INTRALESIONAL; INTRAMUSCULAR; INTRAVENOUS; SOFT TISSUE PRN
Status: DISCONTINUED | OUTPATIENT
Start: 2020-12-09 | End: 2020-12-09 | Stop reason: SURG

## 2020-12-09 RX ORDER — OXYCODONE HCL 5 MG/5 ML
10 SOLUTION, ORAL ORAL
Status: COMPLETED | OUTPATIENT
Start: 2020-12-09 | End: 2020-12-09

## 2020-12-09 RX ORDER — HYDROMORPHONE HYDROCHLORIDE 1 MG/ML
0.1 INJECTION, SOLUTION INTRAMUSCULAR; INTRAVENOUS; SUBCUTANEOUS
Status: DISCONTINUED | OUTPATIENT
Start: 2020-12-09 | End: 2020-12-09 | Stop reason: HOSPADM

## 2020-12-09 RX ORDER — LIDOCAINE HYDROCHLORIDE 20 MG/ML
INJECTION, SOLUTION EPIDURAL; INFILTRATION; INTRACAUDAL; PERINEURAL PRN
Status: DISCONTINUED | OUTPATIENT
Start: 2020-12-09 | End: 2020-12-09 | Stop reason: SURG

## 2020-12-09 RX ADMIN — LIDOCAINE HYDROCHLORIDE 4 ML: 40 SOLUTION TOPICAL at 07:44

## 2020-12-09 RX ADMIN — SUGAMMADEX 200 MG: 100 INJECTION, SOLUTION INTRAVENOUS at 08:28

## 2020-12-09 RX ADMIN — FENTANYL CITRATE 50 MCG: 50 INJECTION, SOLUTION INTRAMUSCULAR; INTRAVENOUS at 10:42

## 2020-12-09 RX ADMIN — POVIDONE IODINE 15 ML: 100 SOLUTION TOPICAL at 06:46

## 2020-12-09 RX ADMIN — OXYCODONE HYDROCHLORIDE 10 MG: 5 SOLUTION ORAL at 10:39

## 2020-12-09 RX ADMIN — LIDOCAINE HYDROCHLORIDE 0.5 ML: 10 INJECTION, SOLUTION EPIDURAL; INFILTRATION; INTRACAUDAL; PERINEURAL at 06:46

## 2020-12-09 RX ADMIN — FENTANYL CITRATE 50 MCG: 50 INJECTION, SOLUTION INTRAMUSCULAR; INTRAVENOUS at 08:20

## 2020-12-09 RX ADMIN — ONDANSETRON 4 MG: 2 INJECTION INTRAMUSCULAR; INTRAVENOUS at 08:15

## 2020-12-09 RX ADMIN — DEXAMETHASONE SODIUM PHOSPHATE 8 MG: 4 INJECTION, SOLUTION INTRA-ARTICULAR; INTRALESIONAL; INTRAMUSCULAR; INTRAVENOUS; SOFT TISSUE at 07:48

## 2020-12-09 RX ADMIN — ROCURONIUM BROMIDE 50 MG: 10 INJECTION, SOLUTION INTRAVENOUS at 07:44

## 2020-12-09 RX ADMIN — FENTANYL CITRATE 100 MCG: 50 INJECTION, SOLUTION INTRAMUSCULAR; INTRAVENOUS at 07:44

## 2020-12-09 RX ADMIN — LIDOCAINE HYDROCHLORIDE 60 MG: 20 INJECTION, SOLUTION EPIDURAL; INFILTRATION; INTRACAUDAL at 07:44

## 2020-12-09 RX ADMIN — CEFAZOLIN 2 G: 330 INJECTION, POWDER, FOR SOLUTION INTRAMUSCULAR; INTRAVENOUS at 07:44

## 2020-12-09 RX ADMIN — KETOROLAC TROMETHAMINE 30 MG: 30 INJECTION, SOLUTION INTRAMUSCULAR at 08:28

## 2020-12-09 RX ADMIN — MIDAZOLAM HYDROCHLORIDE 2 MG: 1 INJECTION, SOLUTION INTRAMUSCULAR; INTRAVENOUS at 07:42

## 2020-12-09 RX ADMIN — PROPOFOL 200 MG: 10 INJECTION, EMULSION INTRAVENOUS at 07:44

## 2020-12-09 RX ADMIN — SODIUM CHLORIDE, POTASSIUM CHLORIDE, SODIUM LACTATE AND CALCIUM CHLORIDE: 600; 310; 30; 20 INJECTION, SOLUTION INTRAVENOUS at 06:47

## 2020-12-09 RX ADMIN — EPHEDRINE SULFATE 5 MG: 50 INJECTION, SOLUTION INTRAVENOUS at 07:55

## 2020-12-09 ASSESSMENT — FIBROSIS 4 INDEX
FIB4 SCORE: 0.45
FIB4 SCORE: 0.45

## 2020-12-09 ASSESSMENT — PAIN DESCRIPTION - PAIN TYPE
TYPE: SURGICAL PAIN
TYPE: SURGICAL PAIN

## 2020-12-09 NOTE — PROGRESS NOTES
1112- Patient arrived in PACU II alert and oriented. Vital signs are within normal limits for the patient. Patient reports pain 1/10. Dressing is clean, dry and intact. Discussed discharge plan of care and patient expressed understanding. All needs met at this time.    1120- Patient ambulated to the restroom to void. Patient voided successfully.    1130- Patient feels ready to be discharged and meets discharge criteria set by Dr. Patience Null. Patient is going to get dressed.    1140- Provided patient with discharge education. All questions answered. Mom is on the way to pick patient up at [facility] [place].    1150- PIV removed and patient discharged home via wheelchair.

## 2020-12-09 NOTE — OR NURSING
Patient arrived in PACU in stable condition. Oral airway removed with no difficulty. She is very drowsy and wants to sleep. 500ml fluid bolus given for bradycardia. Family has been updated.     1045 Patient awake and medicated with IV an oral pain medications. Heart rate improved with fluids and rest. She is delightful and resting. Tolerating po well.

## 2020-12-09 NOTE — OP REPORT
DATE OF SERVICE:  12/09/2020     PREOPERATIVE DIAGNOSES:   1.  Multiparous.  2.  Desires permanent sterilization.     POSTOPERATIVE DIAGNOSES:  1.  Multiparous.  2.  Desires permanent sterilization.     PROCEDURES:  1.  Examination under anesthesia.  2.  Pelviscopy.  3.  Bilateral salpingectomy.     SURGEON:  Nona Null MD     ANESTHESIOLOGIST:  Yousif Ayala MD     ANESTHESIA:  General endotracheal tube anesthesia and local anesthetic.     SPECIMENS:  Portion of bilateral fallopian tubes.     ESTIMATED BLOOD LOSS:  Less than 10 mL.     FINDINGS:  On examination under anesthesia, her uterus is anteverted, mobile   and 8-week size.  Uterus sounds to 8 cm.  A MAX manipulator was used.     LAPAROSCOPIC FINDINGS:  Normal uterus, normal bilateral tubes and bilateral   ovaries.  Normal upper abdomen.  Appendix is not visualized.  Bilateral   ureters are visualized.  No pelvic or abdominal adhesive disease.  No evidence   of endometriosis implants.     COMPLICATIONS:  None apparent.     INDICATIONS FOR PROCEDURE:  The patient is a 25-year-old para 2-0-0-2, not   currently sexually active, postpartum lactating woman who is multiparous and   desires permanent sterilization.     DESCRIPTION OF PROCEDURE:  The patient was taken to the operating room where   she underwent general endotracheal tube anesthesia.  She was then placed in   the dorsal lithotomy position in the [facility].  An examination   under anesthesia was performed and findings noted as above.     The patient was then prepped and draped in the dorsal lithotomy position.     Crabtree catheter was placed in her urinary bladder.     Medium Graves speculum was inserted into the vagina and the cervix was   visualized and grasped with a ring forceps.  The uterus sounded to 8 cm.  The   endocervix was dilated to allow the 8 MAX manipulator to be placed within the   uterine cavity.     The ring and speculum were removed.     Attention turned to  the laparoscopic portion of the procedure.  A 10 mm   infraumbilical skin incision was made with a scalpel after the instillation of   0.25% Marcaine with epinephrine using her previous incision.  The incision   was carried down to the level of the fascia.  The fascia was grasped with   Kocher clamps, elevated and incised with Irizarry scissors.  The fascial incision   was extended laterally with Irizarry scissors.  The S retractors were placed   within all of these incisions.  The peritoneum was identified, grasped with a   Pean clamp and entered sharply with Metzenbaum scissors.  The incision was   extended laterally with Metzenbaum scissors.     Intraabdominal entry was confirmed.  The omentum and bowel were visualized.    The Michelle trocar was inserted under direct visualization through all of these   incisions and was attached with the stay sutures.     The patient was placed in Trendelenburg.  The patient's pelvis and upper   abdomen were explored. Findings noted as above.     A second trocar site was identified 3 fingerbreadths above the pubic   symphysis.  A 5 mm skin incision was made with the scalpel after the   instillation of 0.25% Marcaine with epinephrine.  The 5 mm trocar was inserted   under direct visualization.  The uterus was elevated and deviated to the   left.  The left fallopian tube and ovary were identified and followed out to   the fimbriated end.  The left ureter was identified coursing through the   medial leaf of the broad ligament.  The fimbriated end of the fallopian tube   was grasped with the Prestige clamp, elevated and brought to the midline and   using the LigaSure, a salpingectomy was performed from the fimbriated end to 2   cm from the cornual region of the uterus.  The specimen was sent to   pathology.  The pedicle was identified and found to be hemostatic.     Attention was turned to the right side of the pelvis.  The right fallopian   tube and ovary were examined.  The right fallopian  tube was followed out to   the fimbriated end.  The right ureter was identified coursing through the   medial leaf of the broad ligament.  The fallopian tube was grasped with a   Prestige clamp near the fimbriated end and brought to the midline.  A   salpingectomy was performed using the LigaSure from the fimbriated end to 2 cm   from the cornual region of the uterus.  The specimen was sent to pathology.    The pedicle was examined and found to be hemostatic.     The trocars were removed under direct visualization.  The CO2 gas was released   from the patient's abdomen.  The fascia was reapproximated with 0 Vicryl.    The subcutaneous tissues were reapproximated with 0 Vicryl and the skin was   closed with 4-0 Vicryl.  The 5 mm trocar site skin was closed with 4-0   Monocryl.  The Crabtree catheter was removed from the patient's urinary bladder.    The manipulator was removed from the patient's uterus.  The speculum was   inserted and the cervix was found to be hemostatic.     Sponge, lap and instrument counts were correct x2.     The patient tolerated the procedure without complications.        ______________________________________________  MD ALEXA EATON/SHIVAM/ALLIE    DD:  12/09/2020 10:45  DT:  12/09/2020 11:37    Job#:  [id number]

## 2020-12-09 NOTE — DISCHARGE INSTRUCTIONS
ACTIVITY: Rest and take it easy for the first 24 hours.  A responsible adult is recommended to remain with you during that time.  It is normal to feel sleepy.  We encourage you to not do anything that requires balance, judgment or coordination.    MILD FLU-LIKE SYMPTOMS ARE NORMAL. YOU MAY EXPERIENCE GENERALIZED MUSCLE ACHES, THROAT IRRITATION, HEADACHE AND/OR SOME NAUSEA.    FOR 24 HOURS DO NOT:  Drive, operate machinery or run household appliances.  Drink beer or alcoholic beverages.   Make important decisions or sign legal documents.    SPECIAL INSTRUCTIONS:   Call for a temp >100.4, heavy vaginal bleeding, foul smelling discharge, or if incision oozes blood, pus, or fluid.  No driving for 2 weeks or while on narcotics, no lifting >20 pounds for 4 weeks, and pelvic rest for 6 weeks.  Ambulate Three times a day.  Call for signs/symptoms of DVT or shortness of breath.      DIET: To avoid nausea, slowly advance diet as tolerated, avoiding spicy or greasy foods for the first day.  Add more substantial food to your diet according to your physician's instructions.  Babies can be fed formula or breast milk as soon as they are hungry.  INCREASE FLUIDS AND FIBER TO AVOID CONSTIPATION.    SURGICAL DRESSING/BATHING: Ok to shower, no baths, hot tubs, or swimming until cleared by Dr. Aguayo    FOLLOW-UP APPOINTMENT:  A follow-up appointment should be arranged with your doctor in 1-2 weeks; call to schedule.    You should CALL YOUR PHYSICIAN if you develop:  Fever greater than 101 degrees F.  Pain not relieved by medication, or persistent nausea or vomiting.  Excessive bleeding (blood soaking through dressing) or unexpected drainage from the wound.  Extreme redness or swelling around the incision site, drainage of pus or foul smelling drainage.  Inability to urinate or empty your bladder within 8 hours.  Problems with breathing or chest pain.    You should call 911 if you develop problems with breathing or chest  pain.  If you are unable to contact your doctor or surgical center, you should go to the nearest emergency room or urgent care center.  Physician's telephone #: [phone].    If any questions arise, call your doctor.  If your doctor is not available, please feel free to call the Surgical Center at [phone]. The Contact Center is open Monday through Friday 7AM to 5PM and may speak to a nurse at [phone], or toll free at [phone].     A registered nurse may call you a few days after your surgery to see how you are doing after your procedure.    MEDICATIONS: Resume taking daily medication.  Take prescribed pain medication with food.  If no medication is prescribed, you may take non-aspirin pain medication if needed.  PAIN MEDICATION CAN BE VERY CONSTIPATING.  Take a stool softener or laxative such as senokot, pericolace, or milk of magnesia if needed.    Prescription given.  Last pain medication given at 09:01.    If your physician has prescribed pain medication that includes Acetaminophen (Tylenol), do not take additional Acetaminophen (Tylenol) while taking the prescribed medication.    Depression / Suicide Risk    As you are discharged from this [facility] facility, it is important to learn how to keep safe from harming yourself.    Recognize the warning signs:  · Abrupt changes in personality, positive or negative- including increase in energy   · Giving away possessions  · Change in eating patterns- significant weight changes-  positive or negative  · Change in sleeping patterns- unable to sleep or sleeping all the time   · Unwillingness or inability to communicate  · Depression  · Unusual sadness, discouragement and loneliness  · Talk of wanting to die  · Neglect of personal appearance   · Rebelliousness- reckless behavior  · Withdrawal from people/activities they love  · Confusion- inability to concentrate     If you or a loved one observes any of these behaviors or has concerns about  self-harm, here's what you can do:  · Talk about it- your feelings and reasons for harming yourself  · Remove any means that you might use to hurt yourself (examples: pills, rope, extension cords, firearm)  · Get professional help from the community (Mental Health, Substance Abuse, psychological counseling)  · Do not be alone:Call your Safe Contact- someone whom you trust who will be there for you.  · Call your local CRISIS HOTLINE 117-4859 or 464-051-7969  · Call your local Children's Mobile Crisis Response Team [place] (419) 319-8660 or www.Clinical Data  · Call the toll free National Suicide Prevention Hotlines   · National Suicide Prevention Lifeline 288-883-FIPX (9256)  · National Hope Line Network 800-SUICIDE (343-9201)

## 2020-12-09 NOTE — ANESTHESIA PREPROCEDURE EVALUATION
Obesity, mild asthma    Relevant Problems   Other   (+) Chronic tonsillitis       Physical Exam    Airway   Mallampati: II  TM distance: >3 FB  Neck ROM: full       Cardiovascular - normal exam  Rhythm: regular  Rate: normal  (-) murmur     Dental - normal exam           Pulmonary - normal exam  Breath sounds clear to auscultation     Abdominal    Neurological - normal exam                 Anesthesia Plan    ASA 2       Plan - general       Airway plan will be ETT        Induction: intravenous    Postoperative Plan: Postoperative administration of opioids is intended.    Pertinent diagnostic labs and testing reviewed    Informed Consent:    Anesthetic plan and risks discussed with patient.

## 2020-12-09 NOTE — ANESTHESIA TIME REPORT
Anesthesia Start and Stop Event Times     Date Time Event    12/9/2020 0706 Ready for Procedure     0740 Anesthesia Start     0849 Anesthesia Stop        Responsible Staff  12/09/20    Name Role Begin End    Yousif Ayala M.D. Anesth 0740 0849        Preop Diagnosis (Free Text):  Pre-op Diagnosis     STERILIZATION        Preop Diagnosis (Codes):    Post op Diagnosis  Request for sterilization      Premium Reason  Non-Premium    Comments:

## 2020-12-09 NOTE — OR SURGEON
Immediate Post OP Note    PreOp Diagnosis: 1.  Multiparous.  Desires permanent sterilization.    PostOp Diagnosis:  As above.    Procedure(s):  PELVISCOPY - Wound Class: Clean  SALPINGECTOMY - Wound Class: Clean Contaminated  EXAM UNDER ANESTHESIA, PELVIS - Wound Class: Clean Contaminated    Surgeon(s):  Nona Null M.D.    Anesthesiologist/Type of Anesthesia:  Anesthesiologist: Yousif Ayala M.D./General and local anesthetic    Surgical Staff:  Circulator: Adwoa Holguin R.N.  Scrub Person: Ciarra Barboza  Count Albia: Dottie Humphries R.N.    Specimens removed if any:  ID Type Source Tests Collected by Time Destination   A : bilateral fallopian tubes Tissue Fallopian Tube PATHOLOGY SPECIMEN Nona Null M.D. 12/9/2020 0819        Estimated Blood Loss: <10 cc    Findings: EUA: uterus is AV mobile and 8 weeks size.  Sounds to 8 cm.  8 MAX manipulator used.      Laparoscopic findings: normal uterus, normal bilateral tubes, and bilateral ovaries.  Normal upper abdomen. Appendix is not visualized.  Bilateral ureters are visualized.  No pelvic or abdominal adhesive disease.  No evidence of endometriosis implants.      Complications: None apparent.        12/9/2020 8:42 AM Nona Null M.D.

## 2020-12-09 NOTE — ANESTHESIA PROCEDURE NOTES
Airway    Date/Time: 12/9/2020 7:44 AM  Performed by: Yousif Ayala M.D.  Authorized by: Yousif Ayala M.D.     Location:  OR  Urgency:  Elective  Difficult Airway: No    Indications for Airway Management:  Anesthesia      Spontaneous Ventilation: absent    Sedation Level:  Deep  Preoxygenated: Yes    Patient Position:  Sniffing  Mask Difficulty Assessment:  1 - vent by mask  Final Airway Type:  Endotracheal airway  Final Endotracheal Airway:  ETT  Cuffed: Yes    Technique Used for Successful ETT Placement:  Direct laryngoscopy    Insertion Site:  Oral  Blade Type:  Sofya  Laryngoscope Blade/Videolaryngoscope Blade Size:  3  ETT Size (mm):  7.0  Measured from:  Teeth  ETT to Teeth (cm):  20  Placement Verified by: auscultation and capnometry    Cormack-Lehane Classification:  Grade I - full view of glottis  Number of Attempts at Approach:  1

## 2020-12-09 NOTE — H&P
DATE OF ADMISSION:  2020     PREOPERATIVE DIAGNOSES:    1.  Multiparous.  2.  Desires permanent sterilization.     PLANNED PROCEDURE:  Examination under anesthesia, pelviscopy, bilateral   salpingectomy.     HISTORY OF PRESENT ILLNESS:  The patient is a 25-year-old para 2-0-0-2 not   currently sexually active postpartum lactating woman who is status post an   uncomplicated normal spontaneous vaginal delivery after induction of labor at   38 weeks 3 days on 2020 who is multiparous and desires permanent   sterilization.     Risks, benefits and alternatives of an examination under anesthesia,   pelviscopy and bilateral salpingectomy were discussed with the patient and she   agrees to proceed.     PAST SURGICAL HISTORY:  Exploratory laparoscopy.     PAST MEDICAL HISTORY:  Depression, obesity, recurrent groin abscesses, chronic   hypertension and hidradenitis suppurativa.     SOCIAL HISTORY:  She is .  She denies alcohol, tobacco, or drugs of   abuse.     PAST OBSTETRICAL HISTORY:   x2.     PAST GYNECOLOGICAL HISTORY:  She denies STDs, PID or abnormal Paps.     ALLERGIES:  No known drug allergies.     REVIEW OF SYSTEMS:  Negative.     FAMILY HISTORY:  Noncontributory.     ALLERGIES:  No known drug allergies,.     PHYSICAL EXAMINATION:    VITAL SIGNS:  Blood pressure 141/79, pulse 78, temperature 97.5.  GENERAL:  Alert, awake and oriented x3, in no acute distress.  LUNGS:  Clear to auscultation bilaterally.  HEART:  Regular rate and rhythm.  No murmurs, rubs or gallops.  S1, S2 intact.  GENITOURINARY:  Bimanual exam:  No cervical motion tenderness.  Uterus is   anteverted, mobile, not enlarged.  No adnexal masses.  Normal postpartum   involution.     LABORATORY DATA:  Preoperative, SARS-CoV-2 testing is negative. Qualitative   beta hCG and CBC are pending.     ASSESSMENT AND PLAN:  A 25-year-old para 2-0-0-2 not currently sexually active   postpartum lactating woman who is status post normal  spontaneous vaginal   delivery on 11/02/2020 who is multiparous and who desires permanent   sterilization.     We will proceed to the operating room for an examination under anesthesia,   pelviscopy and bilateral salpingectomy.     Risks, benefits and alternatives were discussed with the patient and she   agrees to proceed.        ______________________________________________  MD ALEXA EATON/RUSHING    DD:  12/08/2020 17:51  DT:  12/08/2020 18:04    Job#:  [id number]

## 2020-12-09 NOTE — ANESTHESIA POSTPROCEDURE EVALUATION
Patient: Alexis Toscano    Procedure Summary     Date: 12/09/20 Room / Location: [place] [id number] / SURGERY [facility]    Anesthesia Start: 0740 Anesthesia Stop: 0849    Procedures:       PELVISCOPY (N/A Pelvis)      SALPINGECTOMY (Bilateral Fallopian Tube)      EXAM UNDER ANESTHESIA, PELVIS (Pelvis) Diagnosis: (STERILIZATION)    Surgeons: Nona Null M.D. Responsible Provider: Yousif Ayala M.D.    Anesthesia Type: general ASA Status: 2          Final Anesthesia Type: general  Last vitals  BP   Blood Pressure: 122/56    Temp   36.4 °C (97.5 °F)    Pulse   Pulse: (!) 54   Resp   19    SpO2   100 %      Anesthesia Post Evaluation    Patient location during evaluation: PACU  Patient participation: complete - patient participated  Level of consciousness: awake and alert    Airway patency: patent  Anesthetic complications: no  Cardiovascular status: hemodynamically stable  Respiratory status: acceptable  Hydration status: euvolemic    PONV: none           Nurse Pain Score: 2 (NPRS)

## 2020-12-10 ENCOUNTER — TELEMEDICINE (OUTPATIENT)
Dept: BEHAVIORAL HEALTH | Facility: CLINIC | Age: 25
End: 2020-12-10
Payer: COMMERCIAL

## 2020-12-10 DIAGNOSIS — F32.89 OTHER DEPRESSION: ICD-10-CM

## 2020-12-10 DIAGNOSIS — F43.20 ADJUSTMENT DISORDER, UNSPECIFIED TYPE: ICD-10-CM

## 2020-12-10 PROCEDURE — 90834 PSYTX W PT 45 MINUTES: CPT | Mod: 95,CR | Performed by: SOCIAL WORKER

## 2020-12-10 RX ORDER — BUSPIRONE HYDROCHLORIDE 15 MG/1
15 TABLET ORAL 2 TIMES DAILY
COMMUNITY
End: 2021-06-02

## 2020-12-10 NOTE — BH THERAPY
" [facility]  Therapy Progress Note    Patient Name: Alexis Toscano  Patient MRN: [id number]  Today's Date: 12/10/2020     Type of session:Individual psychotherapy  Length of session: 45 minutes  Persons in attendance:Patient    Subjective/New Info: This evaluation was conducted via Zoom using secure and encrypted videoconferencing technology. The patient was in a private location in the [facility].    The patient's identity was confirmed and verbal consent was obtained for this virtual visit.    Client reported she is doing okay. She stated she has been overwhelmed with a new born and a toddler in pandemic. She reported she started a new medication to help with her anxiety. She denied any intrusive thoughts or thoughts of harm toward herself or her kids, but often feels irritable and stressed. Continue to monitor this, as they showed up with her last son at 4 months postpartum.     Discussed client joining a zoom meeting or support group with other moms trying to navigate the pandemic. Also discussed utilizing new resources, like the book Breath, Mama, Breath and some self-compassion exercise. Walked client through \"Soften, Soothe, Allow\" exercise, and asked her to practice until next session.     Objective/Observations:   Participation: Active verbal participation, Attentive, Engaged and Open to feedback   Grooming: Good and Casual   Cognition: Alert and Fully Oriented   Eye contact: Good   Mood: Euthymic   Affect: Congruent with content   Thought process: Logical and Goal-directed   Speech: Rate within normal limits and Volume within normal limits   Other:     Diagnoses:   1. Adjustment disorder, unspecified type    2. Other depression         Current risk:   SUICIDE: Low   Homicide: Low   Self-harm: Low   Relapse: Not applicable   Other:    Safety Plan reviewed? Not Indicated   If evidence of imminent risk is present, intervention/plan:     Therapeutic Intervention(s): Relaxation exercise, " Stressors assessed and Supportive psychotherapy    Treatment Goal(s)/Objective(s) addressed:  Collaborative development of treatment goals in following sessions.      Progress toward Treatment Goals: Exacerbation of symptoms of anxiety and stress    Plan:  - Continue Individual therapy  - Next appointment scheduled:  2020  - Discuss treatment options for  specific treatment     Tess Jernigan L.C.S.W.  12/10/2020

## 2020-12-11 ENCOUNTER — DOCUMENTATION (OUTPATIENT)
Dept: BEHAVIORAL HEALTH | Facility: CLINIC | Age: 25
End: 2020-12-11

## 2021-01-14 ENCOUNTER — TELEPHONE (OUTPATIENT)
Dept: SCHEDULING | Facility: IMAGING CENTER | Age: 26
End: 2021-01-14

## 2021-04-05 ENCOUNTER — APPOINTMENT (OUTPATIENT)
Dept: DERMATOLOGY | Facility: IMAGING CENTER | Age: 26
End: 2021-04-05
Payer: COMMERCIAL

## 2021-06-02 ENCOUNTER — OFFICE VISIT (OUTPATIENT)
Dept: URGENT CARE | Facility: CLINIC | Age: 26
End: 2021-06-02
Payer: COMMERCIAL

## 2021-06-02 VITALS
BODY MASS INDEX: 33.82 KG/M2 | OXYGEN SATURATION: 100 % | RESPIRATION RATE: 16 BRPM | TEMPERATURE: 97.7 F | HEART RATE: 65 BPM | HEIGHT: 66 IN | WEIGHT: 210.4 LBS | DIASTOLIC BLOOD PRESSURE: 80 MMHG | SYSTOLIC BLOOD PRESSURE: 120 MMHG

## 2021-06-02 DIAGNOSIS — R11.0 NAUSEA: ICD-10-CM

## 2021-06-02 DIAGNOSIS — G43.909 MIGRAINE WITHOUT STATUS MIGRAINOSUS, NOT INTRACTABLE, UNSPECIFIED MIGRAINE TYPE: ICD-10-CM

## 2021-06-02 PROCEDURE — 99214 OFFICE O/P EST MOD 30 MIN: CPT | Performed by: FAMILY MEDICINE

## 2021-06-02 RX ORDER — KETOROLAC TROMETHAMINE 30 MG/ML
30 INJECTION, SOLUTION INTRAMUSCULAR; INTRAVENOUS ONCE
Status: COMPLETED | OUTPATIENT
Start: 2021-06-02 | End: 2021-06-02

## 2021-06-02 RX ORDER — SUMATRIPTAN 50 MG/1
TABLET, FILM COATED ORAL
Qty: 10 TABLET | Refills: 1 | Status: SHIPPED | OUTPATIENT
Start: 2021-06-02 | End: 2022-01-17

## 2021-06-02 RX ORDER — ONDANSETRON 4 MG/1
4 TABLET, ORALLY DISINTEGRATING ORAL EVERY 8 HOURS PRN
Qty: 6 TABLET | Refills: 0 | Status: SHIPPED | OUTPATIENT
Start: 2021-06-02 | End: 2022-01-17

## 2021-06-02 RX ADMIN — KETOROLAC TROMETHAMINE 30 MG: 30 INJECTION, SOLUTION INTRAMUSCULAR; INTRAVENOUS at 14:12

## 2021-06-02 ASSESSMENT — ENCOUNTER SYMPTOMS
BLURRED VISION: 0
EYE DISCHARGE: 0
WEIGHT LOSS: 0
EYE REDNESS: 0
FOCAL WEAKNESS: 0
MYALGIAS: 0
SENSORY CHANGE: 0
DOUBLE VISION: 0

## 2021-06-02 ASSESSMENT — FIBROSIS 4 INDEX: FIB4 SCORE: 0.33

## 2021-06-02 NOTE — PROGRESS NOTES
"Subjective:      Alexis Toscano is a 25 y.o. female who presents with Migraine (dizzy, lightheaded, walking is difficult, feels as if everythig is spinning, x1 day )          Recurrent problem  1 day migraine headache.  Moderate to severe.  Associated vertigo, photophobia, and nausea.  No fever.  No ataxia PMH migraine.  No change in small caffeine intake.  Moderate alcohol consumption yesterday/2 white claw drinks.  No alcohol today.  No other aggravating or alleviating factors.      Review of Systems   Constitutional: Negative for malaise/fatigue and weight loss.   Eyes: Negative for blurred vision, double vision, discharge and redness.   Musculoskeletal: Negative for joint pain and myalgias.   Skin: Negative for itching and rash.   Neurological: Negative for sensory change and focal weakness.          Objective:     /80 (BP Location: Left arm, Patient Position: Sitting, BP Cuff Size: Adult long)   Pulse 65   Temp 36.5 °C (97.7 °F) (Temporal)   Resp 16   Ht 1.676 m (5' 6\")   Wt 95.4 kg (210 lb 6.4 oz)   SpO2 100%   BMI 33.96 kg/m²      Physical Exam  Constitutional:       General: She is not in acute distress.     Appearance: She is well-developed.   HENT:      Head: Normocephalic and atraumatic.      Right Ear: Tympanic membrane normal.      Left Ear: Tympanic membrane normal.      Nose: Nose normal.   Eyes:      Extraocular Movements: Extraocular movements intact.      Conjunctiva/sclera: Conjunctivae normal.      Pupils: Pupils are equal, round, and reactive to light.   Cardiovascular:      Rate and Rhythm: Normal rate and regular rhythm.      Heart sounds: Normal heart sounds. No murmur heard.     Pulmonary:      Effort: Pulmonary effort is normal.      Breath sounds: Normal breath sounds. No wheezing.   Skin:     General: Skin is warm and dry.      Findings: No rash.   Neurological:      General: No focal deficit present.      Mental Status: She is alert and oriented to person, place, and " time.                     Assessment/Plan:         1. Migraine without status migrainosus, not intractable, unspecified migraine type  ketorolac (TORADOL) injection 30 mg    SUMAtriptan (IMITREX) 50 MG Tab    ondansetron (ZOFRAN ODT) 4 MG TABLET DISPERSIBLE   2. Nausea  ondansetron (ZOFRAN ODT) 4 MG TABLET DISPERSIBLE     Differential diagnosis, natural history, supportive care, and indications for immediate follow-up discussed at length.     Trial of abortive therapy with triptan. F/u pcp

## 2021-08-18 NOTE — PROGRESS NOTES
"Rn was called into room @1830. Mom c/o a sense of \"heaviness\" in her head, Light headedness,  And weakness of her body. Rn took vss. They were wnl. Rn also took sugar- 102. Rn asked mom is she was feeling anxious. She admits to feeling \"worked up.\" mom states that she has hx of anxiety and depression is currently not on medicines due to recent pregnancy. Discussed with mom how hormones can ebb and flow post partum and to let us know if she has any feelings of extreme emotion such as suicidal ideations, lack of motivation to care for baby etc. Mom states that after talking with nurse about anxiety she feels better. Rn notified charge nurse and oncoming nurse.   " [employer]

## 2022-01-17 ENCOUNTER — HOSPITAL ENCOUNTER (OUTPATIENT)
Facility: MEDICAL CENTER | Age: 27
End: 2022-01-17
Attending: FAMILY MEDICINE
Payer: COMMERCIAL

## 2022-01-17 ENCOUNTER — OFFICE VISIT (OUTPATIENT)
Dept: URGENT CARE | Facility: CLINIC | Age: 27
End: 2022-01-17
Payer: COMMERCIAL

## 2022-01-17 VITALS
DIASTOLIC BLOOD PRESSURE: 70 MMHG | OXYGEN SATURATION: 99 % | BODY MASS INDEX: 37.93 KG/M2 | TEMPERATURE: 97 F | HEIGHT: 66 IN | RESPIRATION RATE: 16 BRPM | SYSTOLIC BLOOD PRESSURE: 110 MMHG | WEIGHT: 236 LBS | HEART RATE: 78 BPM

## 2022-01-17 DIAGNOSIS — J01.00 ACUTE NON-RECURRENT MAXILLARY SINUSITIS: ICD-10-CM

## 2022-01-17 DIAGNOSIS — R30.0 DYSURIA: ICD-10-CM

## 2022-01-17 PROBLEM — K58.9 IRRITABLE BOWEL SYNDROME: Status: ACTIVE | Noted: 2019-10-21

## 2022-01-17 PROBLEM — M79.7 FIBROMYALGIA: Status: ACTIVE | Noted: 2019-10-21

## 2022-01-17 PROBLEM — F41.8 MIXED ANXIETY DEPRESSIVE DISORDER: Status: ACTIVE | Noted: 2019-10-21

## 2022-01-17 PROBLEM — F12.10 CANNABIS ABUSE: Status: ACTIVE | Noted: 2019-10-21

## 2022-01-17 PROBLEM — G43.109 MIGRAINE HEADACHE WITH AURA: Status: ACTIVE | Noted: 2019-10-21

## 2022-01-17 PROBLEM — F90.9 ADHD: Status: ACTIVE | Noted: 2019-10-21

## 2022-01-17 LAB
APPEARANCE UR: NORMAL
BILIRUB UR STRIP-MCNC: NEGATIVE MG/DL
COLOR UR AUTO: NORMAL
GLUCOSE UR STRIP.AUTO-MCNC: NEGATIVE MG/DL
KETONES UR STRIP.AUTO-MCNC: NEGATIVE MG/DL
LEUKOCYTE ESTERASE UR QL STRIP.AUTO: NEGATIVE
NITRITE UR QL STRIP.AUTO: NEGATIVE
PH UR STRIP.AUTO: 7 [PH] (ref 5–8)
PROT UR QL STRIP: NEGATIVE MG/DL
RBC UR QL AUTO: NORMAL
SP GR UR STRIP.AUTO: 1.02
UROBILINOGEN UR STRIP-MCNC: NORMAL MG/DL

## 2022-01-17 PROCEDURE — 0240U HCHG SARS-COV-2 COVID-19 NFCT DS RESP RNA 3 TRGT MIC: CPT

## 2022-01-17 PROCEDURE — 87086 URINE CULTURE/COLONY COUNT: CPT

## 2022-01-17 PROCEDURE — 87491 CHLMYD TRACH DNA AMP PROBE: CPT

## 2022-01-17 PROCEDURE — 87660 TRICHOMONAS VAGIN DIR PROBE: CPT

## 2022-01-17 PROCEDURE — 87591 N.GONORRHOEAE DNA AMP PROB: CPT

## 2022-01-17 PROCEDURE — 81002 URINALYSIS NONAUTO W/O SCOPE: CPT | Performed by: FAMILY MEDICINE

## 2022-01-17 PROCEDURE — 87510 GARDNER VAG DNA DIR PROBE: CPT

## 2022-01-17 PROCEDURE — 99214 OFFICE O/P EST MOD 30 MIN: CPT | Performed by: FAMILY MEDICINE

## 2022-01-17 PROCEDURE — 87480 CANDIDA DNA DIR PROBE: CPT

## 2022-01-17 RX ORDER — ESCITALOPRAM OXALATE 20 MG/1
20 TABLET ORAL DAILY
COMMUNITY
Start: 2021-10-27

## 2022-01-17 RX ORDER — AMOXICILLIN 875 MG/1
875 TABLET, COATED ORAL 2 TIMES DAILY
Qty: 14 TABLET | Refills: 0 | Status: SHIPPED | OUTPATIENT
Start: 2022-01-17 | End: 2022-01-24

## 2022-01-17 ASSESSMENT — FIBROSIS 4 INDEX: FIB4 SCORE: 0.34

## 2022-01-17 NOTE — PROGRESS NOTES
"CC:  cough        Cough  This is a new problem. The current episode started 3 wks  ago. The problem has been unchanged. The problem occurs constantly. The cough is dry. Associated symptoms include : fatigue, headache, sinus congestion, nasal d/c,  Subj.  fever. Pertinent negatives include no    , nausea, vomiting, diarrhea, sweats, weight loss or wheezing. Nothing aggravates the symptoms.  Patient has tried nothing for the symptoms. There is no history of asthma.            #2. Dysuria:  x1 d.   Also reports mildly inc vag d/c.    + pain with intercourse.   Denies hematuria.       Past Medical History:   Diagnosis Date   • Pneumonia    • Axillary abscess 2012   • ADHD (attention deficit hyperactivity disorder)    • Adverse effect of anesthesia     pt stated her \"Dad woke up during surgery\"  pt states she has not had any problems   • Anesthesia     pt stated her \"Dad woke up during surgery\"   • Anxiety    • Asthma     Inhalers PRN   • Fibromyalgia    • Gestational hypertension     during pregnancy   • Gynecological disorder     Endometriosis.   • History of IBS    • Psychiatric problem     depression, anxiety   • Snoring     no sleep study         Social History     Socioeconomic History   • Marital status:      Spouse name: Not on file   • Number of children: Not on file   • Years of education: Not on file   • Highest education level: Not on file   Occupational History   • Not on file   Tobacco Use   • Smoking status: Former Smoker     Packs/day: 0.25     Years: 6.00     Pack years: 1.50     Types: Cigarettes     Quit date: 10/30/2017     Years since quittin.2   • Smokeless tobacco: Never Used   Vaping Use   • Vaping Use: Every day   • Substances: Nicotine, Flavoring   • Devices: Refillable tank   Substance and Sexual Activity   • Alcohol use: Yes     Comment: rarely   • Drug use: Yes     Types: Inhaled, Marijuana     Comment: sometimes at bedtime   • Sexual activity: Yes     Partners: Male     " Birth control/protection: None     Comment: pregnant - 10/28/2020   Other Topics Concern   • Behavioral problems Not Asked   • Interpersonal relationships Not Asked   • Sad or not enjoying activities Not Asked   • Suicidal thoughts Not Asked   • Poor school performance Not Asked   • Reading difficulties Not Asked   • Speech difficulties Not Asked   • Writing difficulties Not Asked   • Inadequate sleep Not Asked   • Excessive TV viewing Not Asked   • Excessive video game use Not Asked   • Inadequate exercise Not Asked   • Sports related Not Asked   • Poor diet Not Asked   • Family concerns for drug/alcohol abuse Not Asked   • Poor oral hygiene Not Asked   • Bike safety Not Asked   • Family concerns vehicle safety Not Asked   Social History Narrative   • Not on file     Social Determinants of Health     Financial Resource Strain:    • Difficulty of Paying Living Expenses: Not on file   Food Insecurity:    • Worried About Running Out of Food in the Last Year: Not on file   • Ran Out of Food in the Last Year: Not on file   Transportation Needs:    • Lack of Transportation (Medical): Not on file   • Lack of Transportation (Non-Medical): Not on file   Physical Activity:    • Days of Exercise per Week: Not on file   • Minutes of Exercise per Session: Not on file   Stress:    • Feeling of Stress : Not on file   Social Connections:    • Frequency of Communication with Friends and Family: Not on file   • Frequency of Social Gatherings with Friends and Family: Not on file   • Attends Anabaptism Services: Not on file   • Active Member of Clubs or Organizations: Not on file   • Attends Club or Organization Meetings: Not on file   • Marital Status: Not on file   Intimate Partner Violence:    • Fear of Current or Ex-Partner: Not on file   • Emotionally Abused: Not on file   • Physically Abused: Not on file   • Sexually Abused: Not on file   Housing Stability:    • Unable to Pay for Housing in the Last Year: Not on file   • Number  "of Places Lived in the Last Year: Not on file   • Unstable Housing in the Last Year: Not on file                 Review of Systems   Constitutional: Negative for   chills and malaise/fatigue.   Eyes: Negative for vision changes, d/c.    Respiratory: + for cough and sputum production.    Cardiovascular: Negative for chest pain and palpitations.   Gastrointestinal: Negative for nausea, vomiting, abdominal pain, diarrhea and constipation.   Genitourinary: + for dysuria   Skin: Negative for rash or  itching.   Neurological: Negative for dizziness and tingling.    Psychiatric/Behavioral: Negative for depression.   Hematologic/lymphatic - denies bruising or excessive bleeding  All other systems reviewed and are negative.                   Objective:     /70 (BP Location: Left arm, Patient Position: Sitting, BP Cuff Size: Adult)   Pulse 78   Temp 36.1 °C (97 °F) (Temporal)   Resp 16   Ht 1.676 m (5' 6\")   Wt 107 kg (236 lb)   SpO2 99%       Physical Exam   Constitutional: patient is oriented to person, place, and time. Patient appears well-developed and well-nourished. No distress.   HENT:   Head: Normocephalic and atraumatic.   Right Ear: External ear normal.   Left Ear: External ear normal.   Nose: Mucosal edema  present. + bilat sinus TTP        Mouth/Throat: Mucous membranes are normal. No oral lesions.  No posterior pharyngeal erythema.  No oropharyngeal exudate or posterior oropharyngeal edema.  tonsils are absent  Eyes: Conjunctivae and EOM are normal. Pupils are equal, round, and reactive to light. Right eye exhibits no discharge. Left eye exhibits no discharge. No scleral icterus.   Neck: Normal range of motion. Neck supple. No tracheal deviation present.   Cardiovascular: Normal rate, regular rhythm and normal heart sounds.  Exam reveals no friction rub.    Pulmonary/Chest: Effort normal. No respiratory distress. Patient has no wheezes or rhonchi. Patient has no rales.    Musculoskeletal:  exhibits no " edema.   Lymphadenopathy:     Patient has no cervical adenopathy.      Neurological: patient is alert and oriented to person, place, and time.   Skin: Skin is warm and dry. No rash noted. No erythema.   Psychiatric: patient  has a normal mood and affect.  behavior is normal.   Nursing note and vitals reviewed.       A/P:       1. Acute non-recurrent maxillary sinusitis   will screen for COVID - pt not vaccinated.   Home isolation per CDC guidelines.     - amoxicillin (AMOXIL) 875 MG tablet; Take 1 Tablet by mouth 2 times a day for 7 days.  Dispense: 14 Tablet; Refill: 0  - CoV-2 and Flu A/B by PCR (24 hour In-House): Collect NP swab in VTM; Future           2. Dysuria   - VAGINAL PATHOGENS DNA PANEL; Future  - Chlamydia/GC PCR Urine Or Swab; Future  - POCT Urinalysis  - URINE CULTURE(NEW); Future        Follow up in one week if no improvement, sooner if symptoms worsen.

## 2022-01-18 DIAGNOSIS — J01.00 ACUTE NON-RECURRENT MAXILLARY SINUSITIS: ICD-10-CM

## 2022-01-18 LAB
C TRACH DNA SPEC QL NAA+PROBE: NEGATIVE
CANDIDA DNA VAG QL PROBE+SIG AMP: NEGATIVE
G VAGINALIS DNA VAG QL PROBE+SIG AMP: NEGATIVE
N GONORRHOEA DNA SPEC QL NAA+PROBE: NEGATIVE
SPECIMEN SOURCE: NORMAL
T VAGINALIS DNA VAG QL PROBE+SIG AMP: NEGATIVE

## 2022-01-19 LAB
FLUAV RNA SPEC QL NAA+PROBE: NEGATIVE
FLUBV RNA SPEC QL NAA+PROBE: NEGATIVE
SARS-COV-2 RNA RESP QL NAA+PROBE: NOTDETECTED
SPECIMEN SOURCE: NORMAL

## 2022-01-20 LAB
BACTERIA UR CULT: NORMAL
SIGNIFICANT IND 70042: NORMAL
SITE SITE: NORMAL
SOURCE SOURCE: NORMAL

## 2022-01-31 ENCOUNTER — NON-PROVIDER VISIT (OUTPATIENT)
Dept: OCCUPATIONAL MEDICINE | Facility: CLINIC | Age: 27
End: 2022-01-31

## 2022-01-31 DIAGNOSIS — Z02.1 PRE-EMPLOYMENT DRUG SCREENING: ICD-10-CM

## 2022-01-31 DIAGNOSIS — Z11.1 ENCOUNTER FOR PPD TEST: ICD-10-CM

## 2022-01-31 LAB
AMP AMPHETAMINE: NORMAL
COC COCAINE: NORMAL
INT CON NEG: NORMAL
INT CON POS: NORMAL
MET METHAMPHETAMINES: NORMAL
OPI OPIATES: NORMAL
PCP PHENCYCLIDINE: NORMAL
POC DRUG COMMENT 753798-OCCUPATIONAL HEALTH: NEGATIVE
THC: NORMAL

## 2022-01-31 PROCEDURE — 80305 DRUG TEST PRSMV DIR OPT OBS: CPT | Performed by: PREVENTIVE MEDICINE

## 2022-01-31 PROCEDURE — 86580 TB INTRADERMAL TEST: CPT | Performed by: PREVENTIVE MEDICINE

## 2022-02-02 ENCOUNTER — APPOINTMENT (OUTPATIENT)
Dept: URGENT CARE | Facility: CLINIC | Age: 27
End: 2022-02-02

## 2022-02-02 LAB — TB WHEAL 3D P 5 TU DIAM: 0 MM

## 2022-11-07 NOTE — PROGRESS NOTES
Intake Assessment    Assessment Method  Assessment Method  Assessment Method: In-person assessment    Intake Assessment Completed By  Intake Assessment Completed by:  Completed by:: SAE Fish  Reason for Seeking Treatment  Reason for assessment: Anxiety      C-SSRS (Short)  Ashley Suicide Severity Rating Scale (C-SSRS)  1. Have you wished you were dead or wished you could go to sleep and not wake up? (past month): No  2. Have you actually had any thoughts of killing yourself? (past month): No  6. Have you ever done anything, started to do anything, or prepared to do anything to end your life? (lifetime): No  Suicide Evaluation: Negative Screen - White      Abuse Assessment  Abuse Assessment  Violence/Abuse Screen: No visible signs of abuse or neglect      Assessment Summary  Assessment Summary  Assessment Summary: This writer provided this pt psychiatric resources per MD direction. Pt appears stable and does not require any additional BH intervention at this time.      Intake Assessment Completed    Incomplete assessment. Resources provided at MD direction.    FOID Clear and Present Danger Reporting      Clear and Present Danger:      Final Disposition     Incomplete assessment. Resources provided at MD direction.            Placed discharge outreach phone call to patient s/p ER discharge 10/31/17.  Left voicemail providing my contact information and instructions to call with any questions or concerns.

## (undated) DEVICE — ELECTRODE 850 FOAM ADHESIVE - HYDROGEL RADIOTRNSPRNT (50/PK)

## (undated) DEVICE — Device

## (undated) DEVICE — SUTURE 4-0 VICRYL PLUSFS-1 - 27 INCH (36/BX)

## (undated) DEVICE — NEEDLE INSFL 120MM 14GA VRRS - (20/BX)

## (undated) DEVICE — SET LEADWIRE 5 LEAD BEDSIDE DISPOSABLE ECG (1SET OF 5/EA)

## (undated) DEVICE — TRAY SRGPRP PVP IOD WT PRP - (20/CA)

## (undated) DEVICE — PAD OR TABLE DA VINCI 2IN X 20IN X 72IN - (12EA/CA)

## (undated) DEVICE — BLADE SURGICAL #15 - (50/BX 3BX/CA)

## (undated) DEVICE — GLOVE, BIOGEL ECLIPSE, SZ 7.0, PF LTX (50/BX)

## (undated) DEVICE — TROCAR Z THREAD 12 X 100 - BLADED (6/BX)

## (undated) DEVICE — KIT ANESTHESIA W/CIRCUIT & 3/LT BAG W/FILTER (20EA/CA)

## (undated) DEVICE — FORCEP BIPO CUTTING (EVEREST) - 5MM (5EA/BX)

## (undated) DEVICE — SUCTION INSTRUMENT YANKAUER BULBOUS TIP W/O VENT (50EA/CA)

## (undated) DEVICE — PAD SANITARY 11IN MAXI IND WRAPPED  (12EA/PK 24PK/CA)

## (undated) DEVICE — LACTATED RINGERS INJ 1000 ML - (14EA/CA 60CA/PF)

## (undated) DEVICE — UTERINE MANIP RUMI 6.7X6 - (5/BX)

## (undated) DEVICE — TOWELS CLOTH SURGICAL - (4/PK 20PK/CA)

## (undated) DEVICE — ANTI-FOG SOLUTION - 60BTL/CA

## (undated) DEVICE — BOVIE FOOT CONTROL SUCTION - 6IN 10FR (25EA/CA)

## (undated) DEVICE — GLOVE BIOGEL SZ 6.5 SURGICAL PF LTX (50PR/BX 4BX/CA)

## (undated) DEVICE — GLOVE BIOGEL PI INDICATOR SZ 6.5 SURGICAL PF LF - (50/BX 4BX/CA)

## (undated) DEVICE — HEAD HOLDER JUNIOR/ADULT

## (undated) DEVICE — SPONGE TONSIL LARGE XRAY STERILE - (5/PK 20PK/CA)

## (undated) DEVICE — PENCIL ELECTSURG 10FT BTN SWH - (50/CA)

## (undated) DEVICE — CANISTER SUCTION 3000ML MECHANICAL FILTER AUTO SHUTOFF MEDI-VAC NONSTERILE LF DISP  (40EA/CA)

## (undated) DEVICE — PACK LAPAROSCOPY - (1/CA)

## (undated) DEVICE — KIT ROOM DECONTAMINATION

## (undated) DEVICE — TUBING CLEARLINK DUO-VENT - C-FLO (48EA/CA)

## (undated) DEVICE — ARMREST CRADLE FOAM - (2PR/PK 12PR/CA)

## (undated) DEVICE — MASK ANESTHESIA ADULT  - (100/CA)

## (undated) DEVICE — TUBE E-T HI-LO CUFF 7.0MM (10EA/PK)

## (undated) DEVICE — SET EXTENSION WITH 2 PORTS (48EA/CA) ***PART #2C8610 IS A SUBSTITUTE*****

## (undated) DEVICE — TRAY SKIN SCRUB PVP WET (20EA/CA) PART #DYND70356 DISCONTINUED

## (undated) DEVICE — UTERINE MANIP RUMI 6.7X8 - (5/BX)

## (undated) DEVICE — TUBE TRACHEAL RAE 6.5MM (10EA/BX)

## (undated) DEVICE — ELECTRODE DUAL RETURN W/ CORD - (50/PK)

## (undated) DEVICE — DRAPESURG STERI-DRAPE LONG - (10/BX 4BX/CA)

## (undated) DEVICE — PROTECTOR ULNA NERVE - (36PR/CA)

## (undated) DEVICE — BAG RETRIEVAL 10ML (10EA/BX)

## (undated) DEVICE — SUTURE 4-0 VICRYL PLUS FS-2 - 27 INCH (36/BX)

## (undated) DEVICE — GLOVE BIOGEL SZ 7.5 SURGICAL PF LTX - (50PR/BX 4BX/CA)

## (undated) DEVICE — TROCAR Z THREAD11MM OPTICAL - NON BLADED(6/BX)

## (undated) DEVICE — BOVIE BLADE COATED &INSULATED - 25/PK

## (undated) DEVICE — PEN SKIN MARKER W/RULER - (50EA/BX)

## (undated) DEVICE — GLOVE BIOGEL SZ 7 SURGICAL PF LTX - (50PR/BX 4BX/CA)

## (undated) DEVICE — SUTURE 4-0 MONOCRYL PLUS PS-2 - 27 INCH (36/BX)

## (undated) DEVICE — WATER IRRIGATION STERILE 1000ML (12EA/CA)

## (undated) DEVICE — COVER MAYO STAND X-LG - (22EA/CA)

## (undated) DEVICE — SUTURE 0 VICRYL PLUS CT-2 - 27 INCH (36/BX)

## (undated) DEVICE — SET IRRIGATION CYSTOSCOPY TUBE L80 IN (20EA/CA)

## (undated) DEVICE — SET SUCTION/IRRIGATION WITH DISPOSABLE TIP (6/CA )PART #0250-070-520 IS A SUB

## (undated) DEVICE — LIGASURE 5MM BLUNT TIP LONG - 44CM (6EA/PK)

## (undated) DEVICE — SODIUM CHL IRRIGATION 0.9% 1000ML (12EA/CA)

## (undated) DEVICE — SUTURE 2-0 VICRYL PLUS CT-2 - 27 INCH (36/BX)

## (undated) DEVICE — SET TUBING PNEUMOCLEAR HIGH FLOW SMOKE EVACUATION (10EA/BX)

## (undated) DEVICE — TROCAR SEPARATOR 5X55 - 6/BX

## (undated) DEVICE — CLOSURE SKIN STRIP 1/2 X 4 IN - (STERI STRIP) (50/BX 4BX/CA)

## (undated) DEVICE — GLOVE BIOGEL INDICATOR SZ 6.5 SURGICAL PF LTX - (50PR/BX 4BX/CA)

## (undated) DEVICE — SENSOR SPO2 NEO LNCS ADHESIVE (20/BX) SEE USER NOTES

## (undated) DEVICE — SLEEVE, VASO, THIGH, MED

## (undated) DEVICE — CATHETER IV 20 GA X 1-1/4 ---SURG.& SDS ONLY--- (50EA/BX)

## (undated) DEVICE — TRAY CATHETER FOLEY URINE METER W/STATLOCK 350ML (10EA/CA)

## (undated) DEVICE — NEPTUNE 4 PORT MANIFOLD - (20/PK)

## (undated) DEVICE — GLOVE BIOGEL INDICATOR SZ 7.5 SURGICAL PF LTX - (50PR/BX 4BX/CA)

## (undated) DEVICE — GLOVE SZ 6.5 BIOGEL PI MICRO - PF LF (50PR/BX)

## (undated) DEVICE — GOWN WARMING STANDARD FLEX - (30/CA)

## (undated) DEVICE — TROCAR 5X100 NON BLADED Z-TH - READ KII (6/BX)

## (undated) DEVICE — STERI STRIP COMPOUND BENZOIN - TINCTURE 0.6ML WITH APPLICATOR (40EA/BX)

## (undated) DEVICE — BLADE SURGICAL CLIPPER - (50EA/CA)

## (undated) DEVICE — SPONGE GAUZESTER. 2X2 4-PL - (2/PK 50PK/BX 30BX/CS)

## (undated) DEVICE — SUTURE GENERAL

## (undated) DEVICE — GLOVE BIOGEL INDICATOR SZ 7SURGICAL PF LTX - (50/BX 4BX/CA)

## (undated) DEVICE — CANISTER SUCTION RIGID RED 1500CC (40EA/CA)

## (undated) DEVICE — TROCAR 5X100 BLADED Z-THREAD - KII (6/BX)

## (undated) DEVICE — DRESSING TRANSPARENT FILM TEGADERM 2.375 X 2.75"  (100EA/BX)"

## (undated) DEVICE — PACK ENT OR - (2EA/CA)

## (undated) DEVICE — CHLORAPREP 26 ML APPLICATOR - ORANGE TINT(25/CA)

## (undated) DEVICE — KIT  I.V. START (100EA/CA)

## (undated) DEVICE — LEAD SET 6 DISP. EKG NIHON KOHDEN

## (undated) DEVICE — PROBE LAP LPT-1118A - 10/BX

## (undated) DEVICE — GOWN SURGEONS X-LARGE - DISP. (30/CA)

## (undated) DEVICE — NEEDLE NON SAFETY HYPO 22 GA X 1 1/2 IN (100/BX)

## (undated) DEVICE — TUBE CONNECTING SUCTION - CLEAR PLASTIC STERILE 72 IN (50EA/CA)

## (undated) DEVICE — TROCAR LAPSCP 100MM 12MM NTHRD - (6/BX)